# Patient Record
Sex: MALE | Race: WHITE | Employment: FULL TIME | URBAN - METROPOLITAN AREA
[De-identification: names, ages, dates, MRNs, and addresses within clinical notes are randomized per-mention and may not be internally consistent; named-entity substitution may affect disease eponyms.]

---

## 2018-01-22 PROBLEM — M43.16 SPONDYLOLISTHESIS AT L4-L5 LEVEL: Status: ACTIVE | Noted: 2018-01-22

## 2018-03-12 ENCOUNTER — TREATMENT (OUTPATIENT)
Dept: PHYSICAL THERAPY | Age: 38
End: 2018-03-12
Payer: MEDICAID

## 2018-03-12 DIAGNOSIS — M43.16 SPONDYLOLISTHESIS AT L4-L5 LEVEL: Primary | ICD-10-CM

## 2018-03-12 PROCEDURE — 97112 NEUROMUSCULAR REEDUCATION: CPT

## 2018-03-12 PROCEDURE — 97110 THERAPEUTIC EXERCISES: CPT

## 2018-03-12 PROCEDURE — G0283 ELEC STIM OTHER THAN WOUND: HCPCS

## 2018-03-23 ENCOUNTER — TELEPHONE (OUTPATIENT)
Dept: PHYSICAL THERAPY | Age: 38
End: 2018-03-23

## 2019-09-19 ENCOUNTER — TELEPHONE (OUTPATIENT)
Dept: PHYSICAL MEDICINE AND REHAB | Age: 39
End: 2019-09-19

## 2019-09-19 NOTE — TELEPHONE ENCOUNTER
The patient was last seen on 1/16/18 for evaluation of lower back pain and right hip pain. He has gone through PT for these. Patient is now complaining of a lot of pain in the left hip and wants to schedule an appointment. I informed him that he may need a referral for the left hip. He stated that he does not have a PCP and the physician who referred him for the right hip was an Ortho doctor from [address] [place] [address], who he's only seen once. Please advise.

## 2019-09-27 ENCOUNTER — OFFICE VISIT (OUTPATIENT)
Dept: FAMILY MEDICINE CLINIC | Age: 39
End: 2019-09-27
Payer: MEDICAID

## 2019-09-27 VITALS
DIASTOLIC BLOOD PRESSURE: 80 MMHG | HEART RATE: 59 BPM | SYSTOLIC BLOOD PRESSURE: 119 MMHG | HEIGHT: 69 IN | RESPIRATION RATE: 16 BRPM | WEIGHT: 167 LBS | OXYGEN SATURATION: 98 % | BODY MASS INDEX: 24.73 KG/M2

## 2019-09-27 DIAGNOSIS — M54.16 LUMBAR RADICULOPATHY: Primary | ICD-10-CM

## 2019-09-27 PROCEDURE — G8427 DOCREV CUR MEDS BY ELIG CLIN: HCPCS | Performed by: STUDENT IN AN ORGANIZED HEALTH CARE EDUCATION/TRAINING PROGRAM

## 2019-09-27 PROCEDURE — 99203 OFFICE O/P NEW LOW 30 MIN: CPT | Performed by: STUDENT IN AN ORGANIZED HEALTH CARE EDUCATION/TRAINING PROGRAM

## 2019-09-27 PROCEDURE — G8420 CALC BMI NORM PARAMETERS: HCPCS | Performed by: STUDENT IN AN ORGANIZED HEALTH CARE EDUCATION/TRAINING PROGRAM

## 2019-09-27 PROCEDURE — 1036F TOBACCO NON-USER: CPT | Performed by: STUDENT IN AN ORGANIZED HEALTH CARE EDUCATION/TRAINING PROGRAM

## 2019-09-27 RX ORDER — GABAPENTIN 100 MG/1
100 CAPSULE ORAL 2 TIMES DAILY
Qty: 60 CAPSULE | Refills: 0 | Status: SHIPPED | OUTPATIENT
Start: 2019-09-27 | End: 2019-10-25 | Stop reason: SDUPTHER

## 2019-09-27 RX ORDER — CYCLOBENZAPRINE HCL 5 MG
5 TABLET ORAL 2 TIMES DAILY PRN
Qty: 10 TABLET | Refills: 0 | Status: CANCELLED | OUTPATIENT
Start: 2019-09-27 | End: 2019-10-07

## 2019-09-27 ASSESSMENT — PATIENT HEALTH QUESTIONNAIRE - PHQ9
SUM OF ALL RESPONSES TO PHQ9 QUESTIONS 1 & 2: 0
2. FEELING DOWN, DEPRESSED OR HOPELESS: 0
1. LITTLE INTEREST OR PLEASURE IN DOING THINGS: 0
SUM OF ALL RESPONSES TO PHQ QUESTIONS 1-9: 0
SUM OF ALL RESPONSES TO PHQ QUESTIONS 1-9: 0

## 2019-09-27 ASSESSMENT — ENCOUNTER SYMPTOMS
COUGH: 1
NAUSEA: 0
SHORTNESS OF BREATH: 0
BACK PAIN: 1
DIARRHEA: 0
CONSTIPATION: 0
ABDOMINAL PAIN: 0
BLOOD IN STOOL: 0
SINUS PAIN: 0
WHEEZING: 0
SORE THROAT: 0
VOMITING: 0

## 2019-10-07 ENCOUNTER — HOSPITAL ENCOUNTER (OUTPATIENT)
Dept: MRI IMAGING | Age: 39
Discharge: HOME | End: 2019-10-09
Payer: MEDICAID

## 2019-10-07 DIAGNOSIS — M54.16 LUMBAR RADICULOPATHY: ICD-10-CM

## 2019-10-07 PROCEDURE — 72148 MRI LUMBAR SPINE W/O DYE: CPT

## 2019-10-25 ENCOUNTER — OFFICE VISIT (OUTPATIENT)
Dept: FAMILY MEDICINE CLINIC | Age: 39
End: 2019-10-25
Payer: MEDICAID

## 2019-10-25 VITALS
WEIGHT: 166 LBS | HEART RATE: 83 BPM | RESPIRATION RATE: 16 BRPM | HEIGHT: 69 IN | SYSTOLIC BLOOD PRESSURE: 116 MMHG | DIASTOLIC BLOOD PRESSURE: 77 MMHG | BODY MASS INDEX: 24.59 KG/M2 | OXYGEN SATURATION: 96 %

## 2019-10-25 DIAGNOSIS — M54.16 LUMBAR RADICULOPATHY: Primary | ICD-10-CM

## 2019-10-25 PROCEDURE — 99213 OFFICE O/P EST LOW 20 MIN: CPT | Performed by: STUDENT IN AN ORGANIZED HEALTH CARE EDUCATION/TRAINING PROGRAM

## 2019-10-25 PROCEDURE — G8420 CALC BMI NORM PARAMETERS: HCPCS | Performed by: STUDENT IN AN ORGANIZED HEALTH CARE EDUCATION/TRAINING PROGRAM

## 2019-10-25 PROCEDURE — G8427 DOCREV CUR MEDS BY ELIG CLIN: HCPCS | Performed by: STUDENT IN AN ORGANIZED HEALTH CARE EDUCATION/TRAINING PROGRAM

## 2019-10-25 PROCEDURE — G8484 FLU IMMUNIZE NO ADMIN: HCPCS | Performed by: STUDENT IN AN ORGANIZED HEALTH CARE EDUCATION/TRAINING PROGRAM

## 2019-10-25 PROCEDURE — 1036F TOBACCO NON-USER: CPT | Performed by: STUDENT IN AN ORGANIZED HEALTH CARE EDUCATION/TRAINING PROGRAM

## 2019-10-25 RX ORDER — NAPROXEN SODIUM 220 MG
220 TABLET ORAL 2 TIMES DAILY PRN
Qty: 30 TABLET | Refills: 0 | COMMUNITY
Start: 2019-10-25 | End: 2021-05-27

## 2019-10-25 RX ORDER — GABAPENTIN 100 MG/1
100 CAPSULE ORAL 3 TIMES DAILY
Qty: 90 CAPSULE | Refills: 0 | Status: SHIPPED
Start: 2019-10-25 | End: 2020-06-04 | Stop reason: ALTCHOICE

## 2019-10-28 ENCOUNTER — OFFICE VISIT (OUTPATIENT)
Dept: NEUROSURGERY | Age: 39
End: 2019-10-28
Payer: MEDICAID

## 2019-10-28 VITALS
DIASTOLIC BLOOD PRESSURE: 84 MMHG | BODY MASS INDEX: 24.51 KG/M2 | HEART RATE: 54 BPM | SYSTOLIC BLOOD PRESSURE: 130 MMHG | HEIGHT: 69 IN

## 2019-10-28 DIAGNOSIS — M43.16 SPONDYLOLISTHESIS AT L4-L5 LEVEL: Primary | ICD-10-CM

## 2019-10-28 PROCEDURE — 1036F TOBACCO NON-USER: CPT | Performed by: PHYSICIAN ASSISTANT

## 2019-10-28 PROCEDURE — G8428 CUR MEDS NOT DOCUMENT: HCPCS | Performed by: PHYSICIAN ASSISTANT

## 2019-10-28 PROCEDURE — G8420 CALC BMI NORM PARAMETERS: HCPCS | Performed by: PHYSICIAN ASSISTANT

## 2019-10-28 PROCEDURE — 99203 OFFICE O/P NEW LOW 30 MIN: CPT | Performed by: PHYSICIAN ASSISTANT

## 2019-10-28 PROCEDURE — G8484 FLU IMMUNIZE NO ADMIN: HCPCS | Performed by: PHYSICIAN ASSISTANT

## 2019-12-09 ENCOUNTER — PREP FOR PROCEDURE (OUTPATIENT)
Dept: PAIN MANAGEMENT | Age: 39
End: 2019-12-09

## 2019-12-09 ENCOUNTER — OFFICE VISIT (OUTPATIENT)
Dept: PAIN MANAGEMENT | Age: 39
End: 2019-12-09
Payer: MEDICAID

## 2019-12-09 VITALS
HEIGHT: 70 IN | DIASTOLIC BLOOD PRESSURE: 80 MMHG | WEIGHT: 168 LBS | OXYGEN SATURATION: 93 % | HEART RATE: 69 BPM | SYSTOLIC BLOOD PRESSURE: 108 MMHG | TEMPERATURE: 98.3 F | BODY MASS INDEX: 24.05 KG/M2 | RESPIRATION RATE: 18 BRPM

## 2019-12-09 DIAGNOSIS — M47.816 LUMBAR FACET ARTHROPATHY: Primary | ICD-10-CM

## 2019-12-09 DIAGNOSIS — M43.16 SPONDYLOLISTHESIS OF LUMBAR REGION: ICD-10-CM

## 2019-12-09 DIAGNOSIS — M51.9 LUMBAR DISC DISORDER: ICD-10-CM

## 2019-12-09 DIAGNOSIS — M43.16 SPONDYLOLISTHESIS AT L4-L5 LEVEL: ICD-10-CM

## 2019-12-09 DIAGNOSIS — M47.816 LUMBAR SPONDYLOSIS: ICD-10-CM

## 2019-12-09 PROCEDURE — G8427 DOCREV CUR MEDS BY ELIG CLIN: HCPCS | Performed by: PAIN MEDICINE

## 2019-12-09 PROCEDURE — G8420 CALC BMI NORM PARAMETERS: HCPCS | Performed by: PAIN MEDICINE

## 2019-12-09 PROCEDURE — 99204 OFFICE O/P NEW MOD 45 MIN: CPT | Performed by: PAIN MEDICINE

## 2019-12-09 PROCEDURE — G8484 FLU IMMUNIZE NO ADMIN: HCPCS | Performed by: PAIN MEDICINE

## 2019-12-09 PROCEDURE — 1036F TOBACCO NON-USER: CPT | Performed by: PAIN MEDICINE

## 2020-01-13 ENCOUNTER — HOSPITAL ENCOUNTER (OUTPATIENT)
Age: 40
Setting detail: OUTPATIENT SURGERY
Discharge: HOME | End: 2020-01-13
Attending: PAIN MEDICINE | Admitting: PAIN MEDICINE
Payer: MEDICAID

## 2020-01-13 ENCOUNTER — HOSPITAL ENCOUNTER (OUTPATIENT)
Dept: OPERATING ROOM | Age: 40
Setting detail: OUTPATIENT SURGERY
Discharge: HOME | End: 2020-01-13
Attending: PAIN MEDICINE
Payer: MEDICAID

## 2020-01-13 VITALS
HEART RATE: 57 BPM | DIASTOLIC BLOOD PRESSURE: 67 MMHG | SYSTOLIC BLOOD PRESSURE: 116 MMHG | OXYGEN SATURATION: 99 % | RESPIRATION RATE: 16 BRPM

## 2020-01-13 PROCEDURE — 64495 INJ PARAVERT F JNT L/S 3 LEV: CPT | Performed by: PAIN MEDICINE

## 2020-01-13 PROCEDURE — 7100000011 HC PHASE II RECOVERY - ADDTL 15 MIN: Performed by: PAIN MEDICINE

## 2020-01-13 PROCEDURE — 64494 INJ PARAVERT F JNT L/S 2 LEV: CPT | Performed by: PAIN MEDICINE

## 2020-01-13 PROCEDURE — 3209999900 FLUORO FOR SURGICAL PROCEDURES

## 2020-01-13 PROCEDURE — 2709999900 HC NON-CHARGEABLE SUPPLY: Performed by: PAIN MEDICINE

## 2020-01-13 PROCEDURE — 7100000010 HC PHASE II RECOVERY - FIRST 15 MIN: Performed by: PAIN MEDICINE

## 2020-01-13 PROCEDURE — 64493 INJ PARAVERT F JNT L/S 1 LEV: CPT | Performed by: PAIN MEDICINE

## 2020-01-13 PROCEDURE — 6360000002 HC RX W HCPCS: Performed by: PAIN MEDICINE

## 2020-01-13 PROCEDURE — 2500000003 HC RX 250 WO HCPCS: Performed by: PAIN MEDICINE

## 2020-01-13 PROCEDURE — 3600000005 HC SURGERY LEVEL 5 BASE: Performed by: PAIN MEDICINE

## 2020-01-13 RX ORDER — LIDOCAINE HYDROCHLORIDE 5 MG/ML
INJECTION, SOLUTION INFILTRATION; INTRAVENOUS PRN
Status: DISCONTINUED | OUTPATIENT
Start: 2020-01-13 | End: 2020-01-13 | Stop reason: ALTCHOICE

## 2020-01-13 ASSESSMENT — PAIN DESCRIPTION - DESCRIPTORS: DESCRIPTORS: ACHING

## 2020-01-13 ASSESSMENT — PAIN SCALES - GENERAL
PAINLEVEL_OUTOF10: 0
PAINLEVEL_OUTOF10: 0

## 2020-01-13 ASSESSMENT — PAIN - FUNCTIONAL ASSESSMENT: PAIN_FUNCTIONAL_ASSESSMENT: 0-10

## 2020-01-13 NOTE — H&P
[facility]  [address] [facility], [address] [place] [address]  [phone]    Procedure History & Physical      Pia Tao     HPI:    Patient  is here for axial low back pain for Right L3,4 MB and L5 DR block  Labs/imaging studies reviewed   All question and concerns addressed including R/B/A associated with the procedure    Past Medical History:   Diagnosis Date    Chronic back pain     Kidney stone        Past Surgical History:   Procedure Laterality Date    DENTAL SURGERY         Prior to Admission medications    Medication Sig Start Date End Date Taking? Authorizing Provider   gabapentin (NEURONTIN) 100 MG capsule Take 1 capsule by mouth 3 times daily for 30 days.  Intended supply: 30 days 10/25/19 12/19/19 Yes Nitesh Leonardo,    naproxen sodium (ALEVE) 220 MG tablet Take 1 tablet by mouth 2 times daily as needed for Pain 10/25/19  Yes Malvin Ramirez, DO   Tens Unit MISC by Does not apply route 18  Yes Balbir Bars, DO       No Known Allergies    Social History     Socioeconomic History    Marital status:      Spouse name: Not on file    Number of children: Not on file    Years of education: Not on file    Highest education level: Not on file   Occupational History    Not on file   Social Needs    Financial resource strain: Not on file    Food insecurity:     Worry: Not on file     Inability: Not on file    Transportation needs:     Medical: Not on file     Non-medical: Not on file   Tobacco Use    Smoking status: Former Smoker     Packs/day: 0.50     Years: 23.00     Pack years: 11.50     Types: Cigarettes     Last attempt to quit:      Years since quittin.0    Smokeless tobacco: Never Used   Substance and Sexual Activity    Alcohol use: No    Drug use: No    Sexual activity: Not on file   Lifestyle    Physical activity:     Days per week: Not on file     Minutes per session: Not on file    Stress: Not on file   Relationships    Social connections:     Talks on phone: Not on file     Gets together: Not on file     Attends Protestant service: Not on file     Active member of club or organization: Not on file     Attends meetings of clubs or organizations: Not on file     Relationship status: Not on file    Intimate partner violence:     Fear of current or ex partner: Not on file     Emotionally abused: Not on file     Physically abused: Not on file     Forced sexual activity: Not on file   Other Topics Concern    Not on file   Social History Narrative    Not on file       Family History   Problem Relation Age of Onset    Heart Disease Mother     Diabetes Mother          REVIEW OF SYSTEMS:    CONSTITUTIONAL:  negative for  fevers, chills, sweats and fatigue    RESPIRATORY:  negative for  dry cough, cough with sputum, dyspnea, wheezing and chest pain    CARDIOVASCULAR:  negative for chest pain, dyspnea, palpitations, syncope    GASTROINTESTINAL:  negative for nausea, vomiting, change in bowel habits, diarrhea, constipation and abdominal pain    MUSCULOSKELETAL: negative for muscle weakness    SKIN: negative for itching or rashes. BEHAVIOR/PSYCH:  negative for poor appetite, increased appetite, decreased sleep and poor concentration    All other systems negative      PHYSICAL EXAM:    VITALS:  /77   Pulse 71   Resp 16   SpO2 98%     CONSTITUTIONAL:  awake, alert, cooperative, no apparent distress, and appears stated age    EYES: PERRLA, EOMI    LUNGS:  No increased work of breathing, no audible wheezing    CARDIOVASCULAR:  regular rate and rhythm    ABDOMEN:  Soft non tender non distended     EXTREMITIES: no signs of clubbing or cyanosis. MUSCULOSKELETAL: negative for flaccid muscle tone or spastic movements. SKIN: gross examination reveals no signs of rashes, or diaphoresis. NEURO: Cranial nerves II-XII grossly intact. No signs of agitated mood.        Assessment/Plan:    Axial low back pain for Right L3,4 MB and L5  block

## 2020-01-13 NOTE — OP NOTE
2020    Patient: Joanna Gillette  :  1980  Age:  37 y.o. Sex:  male     PRE-OPERATIVE DIAGNOSIS: Right Lumbar spondylosis, lumbar facet syndrome. POST-OPERATIVE DIAGNOSIS: Same. PROCEDURE:  # 1 Fluoroscopic guided lumbar medial branch blocks Right at Levels: L3-4, L4-5, L5-S1. SURGEON: BRAYAN Jimenez M.D. ANESTHESIA: Local    ESTIMATED BLOOD LOSS: None.  ______________________________________________________________________  BRIEF HISTORY:  Joanna Gillette comes in today for 1 fluoroscopic guided lumbar medial branch blocks  Right  at Levels: L3-4, L4-5, L5-S1. The potential complications of this procedure were discussed with him again today. He has elected to undergo the aforementioned procedure. Marycarmen complete History & Physical examination were reviewed in depth, a copy of which is in the chart. DESCRIPTION OF PROCEDURE:   After confirming written and informed consent, a time-out was performed and Marycarmen name and date of birth, the procedure to be performed as well as the plan for the location of the needle insertion were confirmed. The patient was brought into the procedure room and placed in the prone position on the fluoroscopy table. Standard monitors were placed and vital signs were observed throughout the procedure. The area of the lumbar spine was prepped with chloraprep and draped in a sterile manner. AP fluoroscopy was used to identify and frank bartons point at the targeted levels. The skin and subcutaneous tissues in these identified areas were anesthetized with 0.5%Lidocaine. A 22 # gauge 3 1/2 spinal needle was advanced toward the junction of the superior articular process and the transverse process, along the course of the medial branch. Satisfactory needle placement was confirmed by AP and oblique projections.   At the sacral alar level, the sacral alar region was visualized and the needle tip was positioned on the sacral alar at the base of the superior articulating process where the medial branch traverses under fluoroscopic guidance. Once bone was contacted and negative aspiration was confirmed. A solution of 0.25% marcaine with 40 DepoMedrol 3 cc was then injected and distributed equally at each level. Following the procedure Bob Mclaughlin noted improvement of previous pain symptoms. Disposition the patient tolerated the procedure well and there were no complications . Vital signs remained stable throughout the procedure. The patient was escorted to the recovery area where they remained until discharge and written discharge instructions for the procedure were given. Plan: Bob [place] will return to our pain management center as scheduled.      Froy Gomez MD

## 2020-02-11 ENCOUNTER — PREP FOR PROCEDURE (OUTPATIENT)
Dept: PAIN MANAGEMENT | Age: 40
End: 2020-02-11

## 2020-02-11 ENCOUNTER — OFFICE VISIT (OUTPATIENT)
Dept: PAIN MANAGEMENT | Age: 40
End: 2020-02-11
Payer: MEDICAID

## 2020-02-11 VITALS
HEART RATE: 76 BPM | BODY MASS INDEX: 24.73 KG/M2 | RESPIRATION RATE: 16 BRPM | DIASTOLIC BLOOD PRESSURE: 64 MMHG | SYSTOLIC BLOOD PRESSURE: 106 MMHG | HEIGHT: 69 IN | TEMPERATURE: 98.1 F | WEIGHT: 167 LBS

## 2020-02-11 PROCEDURE — G8427 DOCREV CUR MEDS BY ELIG CLIN: HCPCS | Performed by: PAIN MEDICINE

## 2020-02-11 PROCEDURE — 99213 OFFICE O/P EST LOW 20 MIN: CPT | Performed by: PAIN MEDICINE

## 2020-02-11 PROCEDURE — G8420 CALC BMI NORM PARAMETERS: HCPCS | Performed by: PAIN MEDICINE

## 2020-02-11 PROCEDURE — 1036F TOBACCO NON-USER: CPT | Performed by: PAIN MEDICINE

## 2020-02-11 PROCEDURE — G8484 FLU IMMUNIZE NO ADMIN: HCPCS | Performed by: PAIN MEDICINE

## 2020-02-11 NOTE — PROGRESS NOTES
Anna Rosado presents to the [address] on 2/11/2020. [facility] is complaining of pain low back and at times in hips. The pain is intermittent. The pain is described as stabbing and sharp when it comes. Pain is rated on his best day at a 1, on his worst day at a 9, and on average at a 1 on the VAS scale. He took his last dose of Neurontin yesterday. [facility] does not have issues with constipation. Any procedures since your last visit: Yes,   1-13-20-RIGHT L3,4 MEDIAL BRANCH AND DORSAL RAMUS BLOCK   80 percent relief for 4 weeks, is able to move better after the injection. He is  on NSAIDS, Aleve and  is not on anticoagulation medications to include none. Pacemaker or defibrilator: No.       /64   Pulse 76   Temp 98.1 °F (36.7 °C) (Oral)   Resp 16   Ht 5' 9\" (1.753 m)   Wt 167 lb (75.8 kg)   BMI 24.66 kg/m²      No LMP for male patient.
Quadriceps5/5           Right Gastrocnemius5/5    Left Gastrocnemius5/5  Right Ant Tibialis5/5  Left Ant Tibialis5/5  Reflexes:    Right Quadriceps reflex2+  Left Quadriceps reflex2+  Right Achilles reflex2+  Left Achilles reflex2+  Gait:normal     Dermatology:     Skin:no unusual rashes and no skin lesions     Impression:  Chronic low back pain with radiation to the right buttocks and numbness in the Left leg  Lumbar spine MRI 2019 mild disc bulge at L4-5 with facet arthropathy  Patient had seen neurosurgery and is not a candidate for surgery  Plan:  Follow up on his low back pain   Patient is s/p right L3,4 MB and L5 DR block with excellent outcome, more than 80% for one week. Discussed repeating and if his response is consistent with first injection will proceed with RFA  Continue with Gabapentin 300 mg QD  Continue with OTC pain medications   OARRS report reviewed  Patient encouraged to stay active and to lose weight  Treatment plan discussed with the patient including medications and procedure side effects     We discussed with the patient that combining opioids, benzodiazepines, alcohol, illicit drugs or sleep aids increases the risk of respiratory depression including death. We discussed that these medications may cause drowsiness, sedation or dizziness and have counseled the patient not to drive or operate machinery. We have discussed that these medications will be prescribed only by one provider. We have discussed with the patient about age related risk factors and have thoroughly discussed the importance of taking these medications as prescribed. The patient verbalizes understanding. danielle Lora M.D.

## 2020-02-20 ENCOUNTER — HOSPITAL ENCOUNTER (OUTPATIENT)
Age: 40
Setting detail: OUTPATIENT SURGERY
Discharge: HOME | End: 2020-02-20
Attending: PAIN MEDICINE | Admitting: PAIN MEDICINE
Payer: MEDICAID

## 2020-02-20 VITALS
HEART RATE: 64 BPM | SYSTOLIC BLOOD PRESSURE: 113 MMHG | RESPIRATION RATE: 16 BRPM | DIASTOLIC BLOOD PRESSURE: 71 MMHG | OXYGEN SATURATION: 98 %

## 2020-02-20 PROCEDURE — 7100000011 HC PHASE II RECOVERY - ADDTL 15 MIN: Performed by: PAIN MEDICINE

## 2020-02-20 PROCEDURE — 64494 INJ PARAVERT F JNT L/S 2 LEV: CPT | Performed by: PAIN MEDICINE

## 2020-02-20 PROCEDURE — 64495 INJ PARAVERT F JNT L/S 3 LEV: CPT | Performed by: PAIN MEDICINE

## 2020-02-20 PROCEDURE — 64493 INJ PARAVERT F JNT L/S 1 LEV: CPT | Performed by: PAIN MEDICINE

## 2020-02-20 PROCEDURE — 6360000002 HC RX W HCPCS: Performed by: PAIN MEDICINE

## 2020-02-20 PROCEDURE — 7100000010 HC PHASE II RECOVERY - FIRST 15 MIN: Performed by: PAIN MEDICINE

## 2020-02-20 PROCEDURE — 3600000005 HC SURGERY LEVEL 5 BASE: Performed by: PAIN MEDICINE

## 2020-02-20 PROCEDURE — 2500000003 HC RX 250 WO HCPCS: Performed by: PAIN MEDICINE

## 2020-02-20 PROCEDURE — 2709999900 HC NON-CHARGEABLE SUPPLY: Performed by: PAIN MEDICINE

## 2020-02-20 RX ORDER — LIDOCAINE HYDROCHLORIDE 5 MG/ML
INJECTION, SOLUTION INFILTRATION; INTRAVENOUS PRN
Status: DISCONTINUED | OUTPATIENT
Start: 2020-02-20 | End: 2020-02-20 | Stop reason: ALTCHOICE

## 2020-02-20 ASSESSMENT — PAIN SCALES - GENERAL
PAINLEVEL_OUTOF10: 0
PAINLEVEL_OUTOF10: 0

## 2020-02-20 ASSESSMENT — PAIN - FUNCTIONAL ASSESSMENT
PAIN_FUNCTIONAL_ASSESSMENT: 0-10
PAIN_FUNCTIONAL_ASSESSMENT: PREVENTS OR INTERFERES SOME ACTIVE ACTIVITIES AND ADLS

## 2020-02-20 ASSESSMENT — PAIN DESCRIPTION - DESCRIPTORS: DESCRIPTORS: ACHING

## 2020-02-20 NOTE — H&P
[address]  [address] [facility], [address] [place] [address] [place]  [phone]    Procedure History & Physical      Cyndi Baerrenetta     HPI:    Patient  is here for axial low back pain for right L3,4,5 MBB    Labs/imaging studies reviewed   All question and concerns addressed including R/B/A associated with the procedure    Past Medical History:   Diagnosis Date    Chronic back pain     Kidney stone        Past Surgical History:   Procedure Laterality Date    ANESTHESIA NERVE BLOCK Right 2020    RIGHT L3,4 MEDIAL BRANCH AND DORSAL RAMUS BLOCK WITHOUT SEDATION (CPT 43468,53080) performed by Guillermo Khan MD at [place] [address] Right 2020    mbb and dorsal rami       Prior to Admission medications    Medication Sig Start Date End Date Taking? Authorizing Provider   gabapentin (NEURONTIN) 100 MG capsule Take 1 capsule by mouth 3 times daily for 30 days.  Intended supply: 30 days 10/25/19 2/13/20 Yes Juliet Leonardo,    naproxen sodium (ALEVE) 220 MG tablet Take 1 tablet by mouth 2 times daily as needed for Pain 10/25/19  Yes Audrey Cho DO   Tens Unit MISC by Does not apply route 18   Mil Enriquez,        No Known Allergies    Social History     Socioeconomic History    Marital status:      Spouse name: Not on file    Number of children: Not on file    Years of education: Not on file    Highest education level: Not on file   Occupational History    Not on file   Social Needs    Financial resource strain: Not on file    Food insecurity:     Worry: Not on file     Inability: Not on file    Transportation needs:     Medical: Not on file     Non-medical: Not on file   Tobacco Use    Smoking status: Former Smoker     Packs/day: 0.50     Years: 23.00     Pack years: 11.50     Types: Cigarettes     Last attempt to quit:      Years since quittin.1    Smokeless tobacco: Never Used   Substance and Sexual Activity    Alcohol use: No    Drug use: No    Sexual activity: Not on file   Lifestyle    Physical activity:     Days per week: Not on file     Minutes per session: Not on file    Stress: Not on file   Relationships    Social connections:     Talks on phone: Not on file     Gets together: Not on file     Attends [employer] service: Not on file     Active member of club or organization: Not on file     Attends meetings of clubs or organizations: Not on file     Relationship status: Not on file    Intimate partner violence:     Fear of current or ex partner: Not on file     Emotionally abused: Not on file     Physically abused: Not on file     Forced sexual activity: Not on file   Other Topics Concern    Not on file   Social History Narrative    Not on file       Family History   Problem Relation Age of Onset    Heart Disease Mother     Diabetes Mother          REVIEW OF SYSTEMS:    CONSTITUTIONAL:  negative for  fevers, chills, sweats and fatigue    RESPIRATORY:  negative for  dry cough, cough with sputum, dyspnea, wheezing and chest pain    CARDIOVASCULAR:  negative for chest pain, dyspnea, palpitations, syncope    GASTROINTESTINAL:  negative for nausea, vomiting, change in bowel habits, diarrhea, constipation and abdominal pain    MUSCULOSKELETAL: negative for muscle weakness    SKIN: negative for itching or rashes. BEHAVIOR/PSYCH:  negative for poor appetite, increased appetite, decreased sleep and poor concentration    All other systems negative      PHYSICAL EXAM:    VITALS:  /78   Pulse 61   Resp 20   SpO2 99%     CONSTITUTIONAL:  awake, alert, cooperative, no apparent distress, and appears stated age    EYES: PERRLA, EOMI    LUNGS:  No increased work of breathing, no audible wheezing    CARDIOVASCULAR:  regular rate and rhythm    ABDOMEN:  Soft non tender non distended     EXTREMITIES: no signs of clubbing or cyanosis.     MUSCULOSKELETAL: negative for flaccid muscle tone or spastic movements. SKIN: gross examination reveals no signs of rashes, or diaphoresis. NEURO: Cranial nerves II-XII grossly intact. No signs of agitated mood.        Assessment/Plan:    Axial low back pain for right L3,4,5 MBB

## 2020-03-23 ENCOUNTER — TELEPHONE (OUTPATIENT)
Dept: PAIN MANAGEMENT | Age: 40
End: 2020-03-23

## 2020-03-24 ENCOUNTER — PREP FOR PROCEDURE (OUTPATIENT)
Dept: PAIN MANAGEMENT | Age: 40
End: 2020-03-24

## 2020-03-24 ENCOUNTER — VIRTUAL VISIT (OUTPATIENT)
Dept: PAIN MANAGEMENT | Age: 40
End: 2020-03-24

## 2020-03-24 NOTE — PROGRESS NOTES
[address]  [address] [facility], [address] [place] [address] [place]  [phone]        Date of Visit:  3/24/2020    CC:     Consent:  He and/or health care decision maker is aware that that he may receive a bill for this telephone service, depending on his insurance coverage, and has provided verbal consent to proceed: Yes    HPI:  Documentation:  I communicated with the patient and/or health care decision maker about low back pain. Pain is better. Appropriate analgesia with current medications regimen: n/a. Change in quality of symptoms:no. Medication side effects:none. Recent diagnostic testing:none. Recent interventional procedures:Right L3,4 MB and L5 Block . excellent. 100% improvement for more than one week    He has not been on anticoagulation medications to include none. The patient  has not been on herbal supplements. The patient is not diabetic.     Imaging:   Lumbar spine MRI 10/2019   Changes within the facets are abnormal. Findings are compatible with   degenerative facet disease.           At L5-S1: There is a mild broad-based disc herniation, there is mild   canal stenosis       At L4-5: There is a mild broad-based disc herniation, there is mild   canal stenosis, slightly more prominent towards the left extending   into the left neural foramina       At L3-4: There is no significant disc herniation, there is no   significant canal stenosis.       At L2-3: There is no significant disc herniation, there is no   significant canal stenosis.       At L1-2: There is no significant disc herniation, there is no   significant canal stenosis.      Lumbar spine flexsion/extension 08/2018  L4 spondylolysis with grade 1 spondylolisthesis. Approximately 4 mm translation on flexion-extension views.      Previous treatments: Physical Therapy and medications. Ignacia Coburn                                        Potential Aberrant Drug-Related Behavior:  No     Urine Drug Screening:  None, no opioids started      OARRS report:  02/2020 consistent     Past Medical History: Reviewed    Past Surgical History: Reviewed     Home Medications: Reviewed    Allergies: Reviewed     Social History: Reviewed     REVIEW OF SYSTEMS:     Alex November denies fever/chills, chest pain, shortness of breath, new bowel or bladder complaints. All other review of systems was negative. PHYSICAL EXAMINATION:     General:       A & O x3    Lungs:    Breathing:Breathing Pattern: regular, no distress      Impression:  Chronic low back pain with radiation to the right buttocks and numbness in the Left leg  Lumbar spine MRI 2019 mild disc bulge at L4-5 with facet arthropathy  Patient had seen neurosurgery and is not a candidate for surgery  Plan:  Follow up on his low back pain   Patient is s/p repeat right L3,4 MB and L5 DR block with excellent outcome, more than 90% for one week. Discussed RFA Patient wants to proceed  Continue with Gabapentin 300 mg QD  Continue with OTC pain medications   OARRS report reviewed   Patient encouraged to stay active and to lose weight   Treatment plan discussed with the patient including medications and procedure side effects     We discussed with the patient that combining opioids, benzodiazepines, alcohol, illicit drugs or sleep aids increases the risk of respiratory depression including death. We discussed that these medications may cause drowsiness, sedation or dizziness and have counseled the patient not to drive or operate machinery. We have discussed that these medications will be prescribed only by one provider. We have discussed with the patient about age related risk factors and have thoroughly discussed the importance of taking these medications as prescribed. The patient verbalizes understanding. Steps to help prevent the spread of COVID-19 if you are sick  SOURCE - https://chung-abdiaziz.info/. html     Stay home except to get medical care   ; Stay

## 2020-05-19 ENCOUNTER — VIRTUAL VISIT (OUTPATIENT)
Dept: PAIN MANAGEMENT | Age: 40
End: 2020-05-19
Payer: MEDICAID

## 2020-05-19 PROBLEM — M25.511 CHRONIC RIGHT SHOULDER PAIN: Status: ACTIVE | Noted: 2020-05-19

## 2020-05-19 PROBLEM — M47.812 CERVICAL FACET JOINT SYNDROME: Status: ACTIVE | Noted: 2020-05-19

## 2020-05-19 PROBLEM — G89.29 CHRONIC RIGHT SHOULDER PAIN: Status: ACTIVE | Noted: 2020-05-19

## 2020-05-19 PROBLEM — M47.812 CERVICAL SPONDYLOSIS: Status: ACTIVE | Noted: 2020-05-19

## 2020-05-19 PROCEDURE — 99213 OFFICE O/P EST LOW 20 MIN: CPT | Performed by: PAIN MEDICINE

## 2020-05-19 PROCEDURE — G8427 DOCREV CUR MEDS BY ELIG CLIN: HCPCS | Performed by: PAIN MEDICINE

## 2020-05-19 NOTE — PROGRESS NOTES
[address] [place] [address], [address] [place] [address] [place]  [phone]    Tele health follow up Note      Randee Heath     Date of Visit:  5/19/2020    CC:  Tele health follow up   Chief Complaint   Patient presents with    Follow-up     LOWER BACK, NECK       Consent:  Telehealth Visit due to [place] 19 pandemic  The patient and/or health care decision maker is aware that he/she may receive a bill for this tele-health service Doxy Me, depending on his insurance coverage, and has provided verbal consent to proceed: Yes  I have considered the risks of abuse, dependence, addiction and diversion. My patient is aware that they will need a follow-up visit (in-person or virtually) at the appropriate time indicated for continued medications. Further, my patient is aware that when this acute crisis has lifted, they will be expected to return for an in-person visit and all elements of standard local and hospital guidelines in order to continue this medication. Patient Location:Home   Physician Location: Home     HPI:  Follow up on his low back pain with complaints of increased neck pain with radiation to the right shoulder blade. Appropriate analgesia with current medications regimen: n/a.    Change in quality of symptoms:no.    Medication side effects:none. Recent diagnostic testing:none.   Recent interventional procedures:none.     He has not been on anticoagulation medications to include none.  The patient  has not been on herbal supplements.  The patient is not diabetic.     Imaging:   Lumbar spine MRI 10/2019   Changes within the facets are abnormal. Findings are compatible with   degenerative facet disease.           At L5-S1: There is a mild broad-based disc herniation, there is mild   canal stenosis       At L4-5: There is a mild broad-based disc herniation, there is mild   canal stenosis, slightly more prominent towards the left extending   into the left neural foramina       At L3-4: There is no significant disc herniation, there is no   significant canal stenosis.       At L2-3: There is no significant disc herniation, there is no   significant canal stenosis.       At L1-2: There is no significant disc herniation, there is no   significant canal stenosis.      Lumbar spine flexsion/extension 08/2018  L4 spondylolysis with grade 1 spondylolisthesis. Approximately 4 mm translation on flexion-extension views.      Previous treatments: Physical Therapy and medications. Veronica Knott     Potential Aberrant Drug-Related Behavior:  No     Urine Drug Screening:  None, no opioids started      OARRS report:  05/2020 consistent     Past Medical History: Reviewed    Past Surgical History: Reviewed     Home Medications: Reviewed    Allergies: Reviewed     Social History: Reviewed     REVIEW OF SYSTEMS:     Josh Baars denies fever/chills, chest pain, shortness of breath, new bowel or bladder complaints. All other review of systems was negative. PHYSICAL EXAMINATION:      General:       A & O x3    HEENT:    Head:normocephalic and atraumatic  Sclera: icterus absent,     Lungs:    Breathing:Breathing Pattern: regular, no distress    Cervical spine:    Range of motion:abnormal moderately flexion, extension rotation right and is  painful. Lumbar spine:    Range of motion:not tested    Musculoskeletal:    SLR:not done right, not done left, sitting     Extremities:    Tremors:None bilaterally upper and lower  Range of motion:Generally Limited ROM of right shoulder.   Intact:Yes    Neurological:     Motor:          No apparent weakness per patient         Dermatology:    Skin:no unusual rashes and no skin lesions    Impression:    Chronic low back pain with radiation to the right buttocks and numbness in the Left leg  Lumbar spine MRI 2019 mild disc bulge at L4-5 with facet arthropathy  Patient had seen neurosurgery and is not a candidate for surgery  Plan:  Follow up on his low back pain with complaints of increased

## 2020-05-29 ENCOUNTER — HOSPITAL ENCOUNTER (OUTPATIENT)
Age: 40
Discharge: HOME | End: 2020-05-31
Payer: MEDICAID

## 2020-05-29 ENCOUNTER — HOSPITAL ENCOUNTER (OUTPATIENT)
Dept: GENERAL RADIOLOGY | Age: 40
Discharge: HOME | End: 2020-05-31
Payer: MEDICAID

## 2020-05-29 PROCEDURE — 72040 X-RAY EXAM NECK SPINE 2-3 VW: CPT

## 2020-06-04 ENCOUNTER — HOSPITAL ENCOUNTER (OUTPATIENT)
Age: 40
Discharge: HOME | End: 2020-06-06
Payer: MEDICAID

## 2020-06-04 PROCEDURE — U0003 INFECTIOUS AGENT DETECTION BY NUCLEIC ACID (DNA OR RNA); SEVERE ACUTE RESPIRATORY SYNDROME CORONAVIRUS 2 (SARS-COV-2) (CORONAVIRUS DISEASE [COVID-19]), AMPLIFIED PROBE TECHNIQUE, MAKING USE OF HIGH THROUGHPUT TECHNOLOGIES AS DESCRIBED BY CMS-2020-01-R: HCPCS

## 2020-06-05 LAB
SARS-COV-2: NOT DETECTED
SOURCE: NORMAL

## 2020-06-11 ENCOUNTER — HOSPITAL ENCOUNTER (OUTPATIENT)
Age: 40
Setting detail: OUTPATIENT SURGERY
Discharge: HOME | End: 2020-06-11
Attending: PAIN MEDICINE | Admitting: PAIN MEDICINE
Payer: MEDICAID

## 2020-06-11 ENCOUNTER — HOSPITAL ENCOUNTER (OUTPATIENT)
Dept: OPERATING ROOM | Age: 40
Setting detail: OUTPATIENT SURGERY
Discharge: HOME | End: 2020-06-11
Attending: PAIN MEDICINE
Payer: MEDICAID

## 2020-06-11 VITALS
HEART RATE: 55 BPM | RESPIRATION RATE: 14 BRPM | TEMPERATURE: 98.2 F | OXYGEN SATURATION: 98 % | SYSTOLIC BLOOD PRESSURE: 117 MMHG | DIASTOLIC BLOOD PRESSURE: 76 MMHG

## 2020-06-11 PROCEDURE — 3209999900 FLUORO FOR SURGICAL PROCEDURES

## 2020-06-11 PROCEDURE — 2500000003 HC RX 250 WO HCPCS: Performed by: PAIN MEDICINE

## 2020-06-11 PROCEDURE — 6360000002 HC RX W HCPCS: Performed by: PAIN MEDICINE

## 2020-06-11 PROCEDURE — 64636 DESTROY L/S FACET JNT ADDL: CPT | Performed by: PAIN MEDICINE

## 2020-06-11 PROCEDURE — 3600000015 HC SURGERY LEVEL 5 ADDTL 15MIN: Performed by: PAIN MEDICINE

## 2020-06-11 PROCEDURE — 64635 DESTROY LUMB/SAC FACET JNT: CPT | Performed by: PAIN MEDICINE

## 2020-06-11 PROCEDURE — 2709999900 HC NON-CHARGEABLE SUPPLY: Performed by: PAIN MEDICINE

## 2020-06-11 PROCEDURE — C1713 ANCHOR/SCREW BN/BN,TIS/BN: HCPCS | Performed by: PAIN MEDICINE

## 2020-06-11 PROCEDURE — 3600000005 HC SURGERY LEVEL 5 BASE: Performed by: PAIN MEDICINE

## 2020-06-11 PROCEDURE — 7100000010 HC PHASE II RECOVERY - FIRST 15 MIN: Performed by: PAIN MEDICINE

## 2020-06-11 PROCEDURE — 7100000011 HC PHASE II RECOVERY - ADDTL 15 MIN: Performed by: PAIN MEDICINE

## 2020-06-11 RX ORDER — BUPIVACAINE HYDROCHLORIDE 2.5 MG/ML
INJECTION, SOLUTION EPIDURAL; INFILTRATION; INTRACAUDAL PRN
Status: DISCONTINUED | OUTPATIENT
Start: 2020-06-11 | End: 2020-06-11 | Stop reason: ALTCHOICE

## 2020-06-11 RX ORDER — LIDOCAINE HYDROCHLORIDE 20 MG/ML
INJECTION, SOLUTION EPIDURAL; INFILTRATION; INTRACAUDAL; PERINEURAL PRN
Status: DISCONTINUED | OUTPATIENT
Start: 2020-06-11 | End: 2020-06-11 | Stop reason: ALTCHOICE

## 2020-06-11 RX ORDER — METHYLPREDNISOLONE ACETATE 40 MG/ML
INJECTION, SUSPENSION INTRA-ARTICULAR; INTRALESIONAL; INTRAMUSCULAR; SOFT TISSUE PRN
Status: DISCONTINUED | OUTPATIENT
Start: 2020-06-11 | End: 2020-06-11 | Stop reason: ALTCHOICE

## 2020-06-11 ASSESSMENT — PAIN DESCRIPTION - LOCATION
LOCATION: BACK
LOCATION: BACK

## 2020-06-11 ASSESSMENT — PAIN DESCRIPTION - ORIENTATION: ORIENTATION: RIGHT;LEFT

## 2020-06-11 ASSESSMENT — PAIN - FUNCTIONAL ASSESSMENT: PAIN_FUNCTIONAL_ASSESSMENT: 0-10

## 2020-06-11 ASSESSMENT — PAIN DESCRIPTION - PAIN TYPE
TYPE: SURGICAL PAIN
TYPE: SURGICAL PAIN

## 2020-06-11 ASSESSMENT — PAIN SCALES - GENERAL
PAINLEVEL_OUTOF10: 0
PAINLEVEL_OUTOF10: 0

## 2020-06-11 NOTE — H&P
[address] [facility], [address] [place] [address]  [phone]    Procedure History & Physical      Kevin Dy     HPI:    Patient  is here for axial low back pain for Right L3,4 MB and L5 DR TANMAY  Labs/imaging studies reviewed   All question and concerns addressed including R/B/A associated with the procedure    Past Medical History:   Diagnosis Date    Chronic back pain     Kidney stone        Past Surgical History:   Procedure Laterality Date    ANESTHESIA NERVE BLOCK Right 1/13/2020    RIGHT L3,4 MEDIAL BRANCH AND DORSAL RAMUS BLOCK WITHOUT SEDATION (CPT 67970,73026) performed by Dawson Braga MD at [address] [place] [address] Right 2/20/2020    RIGHT L3,4 MEDIAL BRANCH AND L5 DORSAL RAMUS BLOCK (CPT 87168,28285) performed by Dawson Braga MD at [place] [address] Right 01/13/2020    mbb and dorsal rami    NERVE BLOCK Right 02/20/2020    medial branch block       Prior to Admission medications    Medication Sig Start Date End Date Taking?  Authorizing Provider   naproxen sodium (ALEVE) 220 MG tablet Take 1 tablet by mouth 2 times daily as needed for Pain 10/25/19  Yes Sean Veliz DO   Tens Unit MISC by Does not apply route 2/9/18   Laureen Enriquez, DO       No Known Allergies    Social History     Socioeconomic History    Marital status:      Spouse name: Not on file    Number of children: Not on file    Years of education: Not on file    Highest education level: Not on file   Occupational History    Not on file   Social Needs    Financial resource strain: Not on file    Food insecurity     Worry: Not on file     Inability: Not on file    Transportation needs     Medical: Not on file     Non-medical: Not on file   Tobacco Use    Smoking status: Former Smoker     Packs/day: 0.50     Years: 23.00     Pack years: 11.50     Types: Cigarettes     Last attempt to quit: 2013     Years since quittin.4    Smokeless tobacco: Never Used   Substance and Sexual Activity    Alcohol use: No    Drug use: No    Sexual activity: Not on file   Lifestyle    Physical activity     Days per week: Not on file     Minutes per session: Not on file    Stress: Not on file   Relationships    Social connections     Talks on phone: Not on file     Gets together: Not on file     Attends Faith service: Not on file     Active member of club or organization: Not on file     Attends meetings of clubs or organizations: Not on file     Relationship status: Not on file    Intimate partner violence     Fear of current or ex partner: Not on file     Emotionally abused: Not on file     Physically abused: Not on file     Forced sexual activity: Not on file   Other Topics Concern    Not on file   Social History Narrative    Not on file       Family History   Problem Relation Age of Onset    Heart Disease Mother     Diabetes Mother          REVIEW OF SYSTEMS:    CONSTITUTIONAL:  negative for  fevers, chills, sweats and fatigue    RESPIRATORY:  negative for  dry cough, cough with sputum, dyspnea, wheezing and chest pain    CARDIOVASCULAR:  negative for chest pain, dyspnea, palpitations, syncope    GASTROINTESTINAL:  negative for nausea, vomiting, change in bowel habits, diarrhea, constipation and abdominal pain    MUSCULOSKELETAL: negative for muscle weakness    SKIN: negative for itching or rashes.     BEHAVIOR/PSYCH:  negative for poor appetite, increased appetite, decreased sleep and poor concentration    All other systems negative      PHYSICAL EXAM:    VITALS:  /74   Pulse 68   Temp 98.2 °F (36.8 °C) (Skin)   Resp 16   SpO2 97%     CONSTITUTIONAL:  awake, alert, cooperative, no apparent distress, and appears stated age    EYES: PERRLA, EOMI    LUNGS:  No increased work of breathing, no audible wheezing    CARDIOVASCULAR:  regular rate and rhythm    ABDOMEN:  Soft non tender non distended     EXTREMITIES: no signs of clubbing or cyanosis. MUSCULOSKELETAL: negative for flaccid muscle tone or spastic movements. SKIN: gross examination reveals no signs of rashes, or diaphoresis. NEURO: Cranial nerves II-XII grossly intact. No signs of agitated mood.        Assessment/Plan:    Axial low back pain for right L3,4 MB and L5 DR TANMAY

## 2020-06-11 NOTE — OP NOTE
2020    Patient: Erica Cifuentes  :  1980  Age:  37 y.o. Sex:  male     PRE-OPERATIVE DIAGNOSIS: Right Lumbar spondylosis, lumbar facet arthropathy. POST-OPERATIVE DIAGNOSIS: Same. PROCEDURE:  Fluoroscopic-guided Right  Lumbar facet medial branch radiofrequency ablation at levels L3-4, L4-5, L5-S1. SURGEON:  BRAYAN Sadler M.D. ANESTHESIA: Local    ESTIMATED BLOOD LOSS: None.  ______________________________________________________________________  HISTORY & PHYSICAL: Erica Cifuentes presents today for fluoroscopic-guided Right lumbar facet medial branch radiofrequency ablation at levels L3-4, L4-5, L5-S1. The potential complications of the procedure were explained to Shawn Vallejo again today and he has elected to undergo the aforementioned procedure. Marycarmen complete History & Physical examination were reviewed in depth, a copy of which is in the chart. DESCRIPTION OF PROCEDURE:    After confirming written and informed consent, a time-out was performed and Marycarmen name and date of birth, the procedure to be performed as well as the plan for the location of the needle insertion were confirmed. The patient was brought into the procedure room and placed in the prone position on the fluoroscopy table. Standard monitors were placed and vital signs were observed throughout the procedure. The area of the lumbar spine and upper buttocks was sterilely prepped with chloraprep and draped in a sterile manner. AP fluoroscopy was used to identify and frank bartons point at the targeted area. A 22 gauge 10 mm radiofrequency probe was advanced toward each of these points. Once bone was contacted, negative aspiration for blood and CSF was confirmed, sensory stimulation was performed at 50 Hz and at 0.4 volts generating a pressure sensation. Motor stimulation < 2.0 volts elicited multifidus twitching without any radicular symptoms.  1 ml of 2% lidocaine was injected prior to lesioning, which was performed for 90 seconds at 90 degrees centigrade. Once the lesions were complete, a solution of 0.25% marcaine 3 cc and 40 mg DepoMedrol was injected and distributed equally through each probe. The probes were removed . The patient's back was cleaned and bandages were placed over the needle insertion sites. Disposition the patient tolerated the procedure well and there were no complications . Vital signs remained stable throughout the procedure. The patient was escorted to the recovery area where they remained until discharge and written discharge instructions for the procedure were given. Plan: SAMIRA will return to our pain management center as scheduled.      Trinh aG MD

## 2020-06-23 ENCOUNTER — VIRTUAL VISIT (OUTPATIENT)
Dept: PAIN MANAGEMENT | Age: 40
End: 2020-06-23
Payer: MEDICAID

## 2020-06-23 PROBLEM — M54.12 CERVICAL RADICULOPATHY: Status: ACTIVE | Noted: 2020-06-23

## 2020-06-23 PROCEDURE — G8427 DOCREV CUR MEDS BY ELIG CLIN: HCPCS | Performed by: PAIN MEDICINE

## 2020-06-23 PROCEDURE — G8420 CALC BMI NORM PARAMETERS: HCPCS | Performed by: PAIN MEDICINE

## 2020-06-23 PROCEDURE — 99213 OFFICE O/P EST LOW 20 MIN: CPT | Performed by: PAIN MEDICINE

## 2020-06-23 PROCEDURE — 1036F TOBACCO NON-USER: CPT | Performed by: PAIN MEDICINE

## 2020-06-23 NOTE — PROGRESS NOTES
spine MRI 2019 mild disc bulge at L4-5 with facet arthropathy  Patient had seen neurosurgery and is not a candidate for surgery  Plan:  Follow up on his low back pain with complaints of increased neck pain with radiation to the right shoulder blade and numbness in the right arm. Patient is s/p right L3,4 MB and L5 DR RFA  With good outcome. Results of cervical spine Xray were discussed with the patient. Moderate degenerative changes C5-6. Will schedule patient for cervical spine MRI. Will consider interventional procedures after reviewing MRI. Continue with Gabapentin 300 mg QD (no refill needed). Continue with OTC pain medications. OARRS report reviewed 06/2020.              Patient encouraged to stay active.              Treatment plan discussed with the patient including medications side effects. We discussed with the patient that combining opioids, benzodiazepines, alcohol, illicit drugs or sleep aids increases the risk of respiratory depression including death. We discussed that these medications may cause drowsiness, sedation or dizziness and have counseled the patient not to drive or operate machinery. We have discussed that these medications will be prescribed only by one provider. We have discussed with the patient about age related risk factors and have thoroughly discussed the importance of taking these medications as prescribed. The patient verbalizes understanding. Patient advised regarding steps to help prevent the spread of COVID-19   SOURCE - https://chung-freed.info/. html     1-Stay home except to get medical care  2-Clean your hands often for atleast 20 seconds, avoid touching: Avoid touching your eyes, nose, and mouth with unwashed hands. 3-Seek medical attention: Seek prompt medical attention if your illness is worsening (e.g., difficulty breathing).   Call you doctor first.  3-Wear a facemask if you are sick   4-Cover your coughs and sneezes I affirm this is a Patient Initiated Episode with an established Patient who has not had a related appointment within my department in the past 7 days or scheduled within the next 24 hours.     Total Time: 11-20 minutes     Cc: Referring physician

## 2020-06-24 ENCOUNTER — TELEPHONE (OUTPATIENT)
Dept: PAIN MANAGEMENT | Age: 40
End: 2020-06-24

## 2020-06-24 NOTE — TELEPHONE ENCOUNTER
6-24-20-received a call from Angel Luis Bucio that he had an cervical MRI ordered by Dr Pato Caldera and recently had a lumbar radiofrequency on 6-11-20, the MRI department states that he can not have an MRI until he is cleared after having the procedure. Will discuss with Dr Pato Caldera and call Angel Luis Bucio back.     Alisson Rosario RN  Pain Management

## 2020-06-25 ENCOUNTER — TELEPHONE (OUTPATIENT)
Dept: PAIN MANAGEMENT | Age: 40
End: 2020-06-25

## 2020-07-08 ENCOUNTER — HOSPITAL ENCOUNTER (OUTPATIENT)
Dept: MRI IMAGING | Age: 40
Discharge: HOME | End: 2020-07-10
Payer: MEDICAID

## 2020-07-08 PROCEDURE — 72141 MRI NECK SPINE W/O DYE: CPT

## 2020-07-28 ENCOUNTER — PREP FOR PROCEDURE (OUTPATIENT)
Dept: PAIN MANAGEMENT | Age: 40
End: 2020-07-28

## 2020-07-28 ENCOUNTER — OFFICE VISIT (OUTPATIENT)
Dept: PAIN MANAGEMENT | Age: 40
End: 2020-07-28
Payer: MEDICAID

## 2020-07-28 VITALS
BODY MASS INDEX: 25.18 KG/M2 | OXYGEN SATURATION: 98 % | TEMPERATURE: 97.4 F | HEART RATE: 57 BPM | HEIGHT: 69 IN | DIASTOLIC BLOOD PRESSURE: 62 MMHG | RESPIRATION RATE: 18 BRPM | WEIGHT: 170 LBS | SYSTOLIC BLOOD PRESSURE: 118 MMHG

## 2020-07-28 PROCEDURE — 99214 OFFICE O/P EST MOD 30 MIN: CPT

## 2020-07-28 PROCEDURE — 99214 OFFICE O/P EST MOD 30 MIN: CPT | Performed by: PAIN MEDICINE

## 2020-07-28 PROCEDURE — G8419 CALC BMI OUT NRM PARAM NOF/U: HCPCS | Performed by: PAIN MEDICINE

## 2020-07-28 PROCEDURE — G8427 DOCREV CUR MEDS BY ELIG CLIN: HCPCS | Performed by: PAIN MEDICINE

## 2020-07-28 PROCEDURE — 1036F TOBACCO NON-USER: CPT | Performed by: PAIN MEDICINE

## 2020-07-28 NOTE — PROGRESS NOTES
[address]  [address] [facility], [address] [place] [address] [place]  [phone]    Follow up Note      Sophie Sabillon     Date of Visit:  7/28/2020    CC:  Patient presents for follow up   Chief Complaint   Patient presents with    Pain     neck and back        HPI:  low back pain is better, neck pain is stable. Appropriate analgesia with current medications regimen: N/A    Change in quality of symptoms:no.    Medication side effects:none. Recent diagnostic testing:cervical spine MRI. Recent interventional procedures:None     He has not been on anticoagulation medications to include none. The patient  has not been on herbal supplements.  The patient is not diabetic.     Imaging:   Cervical spine MRI 2020  Findings compatible with degenerative changes     Cervical spine Xray 2020  Findings consistent with moderate degenerative disc disease of C5-C6   and mild degenerative facet disease.      Lumbar spine MRI 10/2019   Changes within the facets are abnormal. Findings are compatible with   degenerative facet disease.           At L5-S1: There is a mild broad-based disc herniation, there is mild   canal stenosis       At L4-5: There is a mild broad-based disc herniation, there is mild   canal stenosis, slightly more prominent towards the left extending   into the left neural foramina       At L3-4: There is no significant disc herniation, there is no   significant canal stenosis.       At L2-3: There is no significant disc herniation, there is no   significant canal stenosis.       At L1-2: There is no significant disc herniation, there is no   significant canal stenosis.      Lumbar spine flexsion/extension 08/2018  L4 spondylolysis with grade 1 spondylolisthesis. Approximately 4 mm translation on flexion-extension views.      Previous treatments: Physical Therapy and medications. Mitchael Bear     Potential Aberrant Drug-Related Behavior:  No     Urine Drug Screening:  None, no opioids started      OARRS report:  2020 consistent     Past Medical History:   Diagnosis Date    Chronic back pain     Kidney stone     Neck pain      Past Surgical History:   Procedure Laterality Date    ANESTHESIA NERVE BLOCK Right 2020    RIGHT L3,4 MEDIAL BRANCH AND DORSAL RAMUS BLOCK WITHOUT SEDATION (CPT 83918,96182) performed by Alvarado Bernstein MD at [address] Right 2020    RIGHT L3,4 MEDIAL BRANCH AND L5 DORSAL RAMUS BLOCK (CPT 00431,26909) performed by Alvarado Bernstein MD at [place] [address] Right 2020    mbb and dorsal rami    NERVE BLOCK Right 2020    medial branch block    PAIN MANAGEMENT PROCEDURE Right 2020    RIGHT L3, 4 MEDIAL BRANCH AND L5 DORSAL RAMUS RADIOFREQUENCY ABLATION performed by Alvarado Bernstein MD at [address] [place] [address]     Prior to Admission medications    Medication Sig Start Date End Date Taking?  Authorizing Provider   naproxen sodium (ALEVE) 220 MG tablet Take 1 tablet by mouth 2 times daily as needed for Pain 10/25/19  Yes Milagros Gomez, DO   Tens Unit MISC by Does not apply route 18  Yes Tom Roth, DO     No Known Allergies    Social History     Socioeconomic History    Marital status:      Spouse name: Not on file    Number of children: Not on file    Years of education: Not on file    Highest education level: Not on file   Occupational History    Not on file   Social Needs    Financial resource strain: Not on file    Food insecurity     Worry: Not on file     Inability: Not on file    Transportation needs     Medical: Not on file     Non-medical: Not on file   Tobacco Use    Smoking status: Former Smoker     Packs/day: 0.50     Years: 23.00     Pack years: 11.50     Types: Cigarettes     Last attempt to quit: 2013     Years since quittin.5    Smokeless tobacco: Never Used   Substance and Sexual Activity    Alcohol use: No    Drug use: No    Sexual Reyez's:negative right, negative                left   SI joint tenderness:negative right, negative left              RADAMES test:not done right, not done             left  Piriformis tenderness:not done right, not done left  Trochanteric bursa tenderness:not done right, not done left  SLR:not done right, not done left, sitting     Extremities:    Tremors:None bilaterally upper and lower  Range of motion:Generally normal shoulders, pain with internal rotation of hips negative. Intact:Yes  Edema:Normal    Neurological:    Sensory:normal to light touch bilateral upper and lower extremities. Motor:   Right Grip5/5              Left Grip5/5               Right Bicep5/5           Left Bicep5/5              Right Triceps5/5       Left Triceps5/5          Right Deltoid5/5     Left Deltoid5/5                  Right Quadriceps5/5          Left Quadriceps5/5           Right Gastrocnemius5/5    Left Gastrocnemius5/5  Right Ant Tibialis5/5  Left Ant Tibialis5/5  Reflexes:    Right Brachioradialis reflex2+  Left Brachioradialis reflex2+  Right Biceps reflex2+  Left Biceps reflex2+  Right Triceps reflex2+  Left Triceps reflex2+  Right Quadriceps reflex2+  Left Quadriceps reflex2+  Right Achilles reflex2+  Left Achilles reflex2+  Gait:normal    Dermatology:    Skin:no unusual rashes and no skin lesions    Impression:      Chonic low back pain with radiation to the right buttocks and numbness in the Left leg  Lumbar spine MRI 2019 mild disc bulge at L4-5 with facet arthropathy  Patient had seen neurosurgery and is not a candidate for surgery  Plan:  Follow up on his low back neck pain with radiation to the right shoulder blade and numbness in the right arm. Results of cervical spine MRI were discussed with the patient. Currently the numbness in the right arm had improved.  He continues to complain of neck pain on the right side aggravated by twisting his head with positive facet tenderness on exam.  Discussed diagnostic cervcal facet MBB at C3,4,5 MBB. Patient agrees. Continues to benefit from right L3,4 MB and L5 DR TANMAY. Continue with Gabapentin 300 mg QD (no refill needed). Continue with OTC pain medications. OARRS report reviewed 0/2020.              Patient encouraged to stay active.              Treatment plan discussed with the patient including medications and procedure side effects. We discussed with the patient that combining opioids, benzodiazepines, alcohol, illicit drugs or sleep aids increases the risk of respiratory depression including death. We discussed that these medications may cause drowsiness, sedation or dizziness and have counseled the patient not to drive or operate machinery. We have discussed that these medications will be prescribed only by one provider. We have discussed with the patient about age related risk factors and have thoroughly discussed the importance of taking these medications as prescribed. The patient verbalizes understanding. ccrvinicio Avery M.D.

## 2020-09-03 ENCOUNTER — HOSPITAL ENCOUNTER (OUTPATIENT)
Age: 40
Setting detail: OUTPATIENT SURGERY
Discharge: HOME | End: 2020-09-03
Attending: PAIN MEDICINE | Admitting: PAIN MEDICINE
Payer: MEDICAID

## 2020-09-03 ENCOUNTER — HOSPITAL ENCOUNTER (OUTPATIENT)
Dept: OPERATING ROOM | Age: 40
Setting detail: OUTPATIENT SURGERY
Discharge: HOME | End: 2020-09-03
Attending: PAIN MEDICINE
Payer: MEDICAID

## 2020-09-03 VITALS
HEART RATE: 54 BPM | BODY MASS INDEX: 25.18 KG/M2 | HEIGHT: 69 IN | SYSTOLIC BLOOD PRESSURE: 115 MMHG | WEIGHT: 170 LBS | DIASTOLIC BLOOD PRESSURE: 76 MMHG | TEMPERATURE: 98.2 F | RESPIRATION RATE: 16 BRPM | OXYGEN SATURATION: 99 %

## 2020-09-03 PROCEDURE — 6360000002 HC RX W HCPCS: Performed by: PAIN MEDICINE

## 2020-09-03 PROCEDURE — 64490 INJ PARAVERT F JNT C/T 1 LEV: CPT | Performed by: PAIN MEDICINE

## 2020-09-03 PROCEDURE — 3209999900 FLUORO FOR SURGICAL PROCEDURES

## 2020-09-03 PROCEDURE — 2709999900 HC NON-CHARGEABLE SUPPLY: Performed by: PAIN MEDICINE

## 2020-09-03 PROCEDURE — 2500000003 HC RX 250 WO HCPCS: Performed by: PAIN MEDICINE

## 2020-09-03 PROCEDURE — 64491 INJ PARAVERT F JNT C/T 2 LEV: CPT | Performed by: PAIN MEDICINE

## 2020-09-03 PROCEDURE — 3600000005 HC SURGERY LEVEL 5 BASE: Performed by: PAIN MEDICINE

## 2020-09-03 PROCEDURE — 7100000010 HC PHASE II RECOVERY - FIRST 15 MIN: Performed by: PAIN MEDICINE

## 2020-09-03 RX ORDER — LIDOCAINE HYDROCHLORIDE 5 MG/ML
INJECTION, SOLUTION INFILTRATION; INTRAVENOUS PRN
Status: DISCONTINUED | OUTPATIENT
Start: 2020-09-03 | End: 2020-09-03 | Stop reason: ALTCHOICE

## 2020-09-03 ASSESSMENT — PAIN SCALES - GENERAL
PAINLEVEL_OUTOF10: 0
PAINLEVEL_OUTOF10: 0

## 2020-09-03 ASSESSMENT — PAIN DESCRIPTION - DESCRIPTORS: DESCRIPTORS: ACHING

## 2020-09-03 ASSESSMENT — PAIN - FUNCTIONAL ASSESSMENT: PAIN_FUNCTIONAL_ASSESSMENT: 0-10

## 2020-09-03 NOTE — OP NOTE
the recovery area where they remained until discharge and written discharge instructions for the procedure were given. Plan: Sergo Funes will return to our pain management center as scheduled.      German Oshea MD

## 2020-09-03 NOTE — H&P
 Smoking status: Former Smoker     Packs/day: 0.50     Years: 23.00     Pack years: 11.50     Types: Cigarettes     Last attempt to quit: 2013     Years since quittin.6    Smokeless tobacco: Never Used   Substance and Sexual Activity    Alcohol use: No    Drug use: No    Sexual activity: Not on file   Lifestyle    Physical activity     Days per week: Not on file     Minutes per session: Not on file    Stress: Not on file   Relationships    Social connections     Talks on phone: Not on file     Gets together: Not on file     Attends Voodoo service: Not on file     Active member of club or organization: Not on file     Attends meetings of clubs or organizations: Not on file     Relationship status: Not on file    Intimate partner violence     Fear of current or ex partner: Not on file     Emotionally abused: Not on file     Physically abused: Not on file     Forced sexual activity: Not on file   Other Topics Concern    Not on file   Social History Narrative    Not on file       Family History   Problem Relation Age of Onset    Heart Disease Mother     Diabetes Mother          REVIEW OF SYSTEMS:    CONSTITUTIONAL:  negative for  fevers, chills, sweats and fatigue    RESPIRATORY:  negative for  dry cough, cough with sputum, dyspnea, wheezing and chest pain    CARDIOVASCULAR:  negative for chest pain, dyspnea, palpitations, syncope    GASTROINTESTINAL:  negative for nausea, vomiting, change in bowel habits, diarrhea, constipation and abdominal pain    MUSCULOSKELETAL: negative for muscle weakness    SKIN: negative for itching or rashes.     BEHAVIOR/PSYCH:  negative for poor appetite, increased appetite, decreased sleep and poor concentration    All other systems negative      PHYSICAL EXAM:    VITALS:  /81   Pulse 63   Temp 98.2 °F (36.8 °C) (Skin)   Resp 14   Ht 5' 9\" (1.753 m)   Wt 170 lb (77.1 kg)   SpO2 98%   BMI 25.10 kg/m²     CONSTITUTIONAL:  awake, alert, cooperative, no

## 2020-09-25 ENCOUNTER — OFFICE VISIT (OUTPATIENT)
Dept: PAIN MANAGEMENT | Age: 40
End: 2020-09-25
Payer: MEDICAID

## 2020-09-25 VITALS
OXYGEN SATURATION: 98 % | TEMPERATURE: 97.5 F | SYSTOLIC BLOOD PRESSURE: 116 MMHG | RESPIRATION RATE: 16 BRPM | DIASTOLIC BLOOD PRESSURE: 80 MMHG | WEIGHT: 170 LBS | BODY MASS INDEX: 25.18 KG/M2 | HEIGHT: 69 IN | HEART RATE: 55 BPM

## 2020-09-25 PROBLEM — M43.16 SPONDYLOLISTHESIS AT L4-L5 LEVEL: Status: ACTIVE | Noted: 2020-09-25

## 2020-09-25 PROBLEM — M19.011 ARTHRITIS OF RIGHT ACROMIOCLAVICULAR JOINT: Status: ACTIVE | Noted: 2020-09-25

## 2020-09-25 PROCEDURE — G8419 CALC BMI OUT NRM PARAM NOF/U: HCPCS | Performed by: PAIN MEDICINE

## 2020-09-25 PROCEDURE — 99213 OFFICE O/P EST LOW 20 MIN: CPT | Performed by: PAIN MEDICINE

## 2020-09-25 PROCEDURE — 1036F TOBACCO NON-USER: CPT | Performed by: PAIN MEDICINE

## 2020-09-25 PROCEDURE — G8427 DOCREV CUR MEDS BY ELIG CLIN: HCPCS | Performed by: PAIN MEDICINE

## 2020-09-25 NOTE — PROGRESS NOTES
[address]  [address] [facility], [address] [place] [address]  [phone]    Follow up Note      Casey Forbes     Date of Visit:  9/25/2020    CC:  Patient presents for follow up   Chief Complaint   Patient presents with    Lower Back Pain       HPI:  Follow up on his low back and neck pain  Appropriate analgesia with current medications regimen: N/A    Change in quality of symptoms:no.    Medication side effects:none. Recent diagnostic testing:none  Recent interventional procedures:Right C3,4,5 MBB      He has not been on anticoagulation medications to include none. The patient  has not been on herbal supplements.  The patient is not diabetic.     Imaging:   Cervical spine MRI 2020  Findings compatible with degenerative changes     Cervical spine Xray 2020  Findings consistent with moderate degenerative disc disease of C5-C6   and mild degenerative facet disease.      Lumbar spine MRI 10/2019   Changes within the facets are abnormal. Findings are compatible with   degenerative facet disease.           At L5-S1: There is a mild broad-based disc herniation, there is mild   canal stenosis       At L4-5: There is a mild broad-based disc herniation, there is mild   canal stenosis, slightly more prominent towards the left extending   into the left neural foramina       At L3-4: There is no significant disc herniation, there is no   significant canal stenosis.       At L2-3: There is no significant disc herniation, there is no   significant canal stenosis.       At L1-2: There is no significant disc herniation, there is no   significant canal stenosis.      Lumbar spine flexsion/extension 08/2018  L4 spondylolysis with grade 1 spondylolisthesis. Approximately 4 mm translation on flexion-extension views.      Previous treatments: Physical Therapy and medications. Auther Luigi     Potential Aberrant Drug-Related Behavior:  No     Urine Drug Screening:  None, no opioids started      OARRS report:  09/2020 consistent     Past Medical History:   Diagnosis Date    Chronic back pain     Kidney stone     Neck pain      Past Surgical History:   Procedure Laterality Date    ANESTHESIA NERVE BLOCK Right 1/13/2020    RIGHT L3,4 MEDIAL BRANCH AND DORSAL RAMUS BLOCK WITHOUT SEDATION (CPT 45966,07431) performed by Chris Camargo MD at [address] Right 2/20/2020    RIGHT L3,4 MEDIAL BRANCH AND L5 DORSAL RAMUS BLOCK (CPT 80866,04802) performed by Chris Camargo MD at [place] [address] Right 01/13/2020    mbb and dorsal rami    NERVE BLOCK Right 02/20/2020    medial branch block    NERVE BLOCK Right 09/03/2020    NERVE BLOCK Right 9/3/2020    RIGHT C3,4,5 MEDIAL BRANCH BLOCK (CPT 99833,83392) [facility] Right 6/11/2020    RIGHT L3, 4 MEDIAL BRANCH AND L5 DORSAL RAMUS RADIOFREQUENCY ABLATION performed by Chris Camargo MD at [address] [place] [address]     Prior to Admission medications    Medication Sig Start Date End Date Taking?  Authorizing Provider   naproxen sodium (ALEVE) 220 MG tablet Take 1 tablet by mouth 2 times daily as needed for Pain 10/25/19  Yes Rocio Demarco DO   Tens Unit MISC by Does not apply route 2/9/18  Yes Tammie Cifuentes DO     No Known Allergies    Social History     Socioeconomic History    Marital status:      Spouse name: Not on file    Number of children: Not on file    Years of education: Not on file    Highest education level: Not on file   Occupational History    Not on file   Social Needs    Financial resource strain: Not on file    Food insecurity     Worry: Not on file     Inability: Not on file    Transportation needs     Medical: Not on file     Non-medical: Not on file   Tobacco Use    Smoking status: Former Smoker     Packs/day: 0.50     Years: 23.00     Pack years: 11.50     Types: Cigarettes     Last attempt to quit: 2013 Years since quittin.7    Smokeless tobacco: Never Used   Substance and Sexual Activity    Alcohol use: No    Drug use: No    Sexual activity: Not on file   Lifestyle    Physical activity     Days per week: Not on file     Minutes per session: Not on file    Stress: Not on file   Relationships    Social connections     Talks on phone: Not on file     Gets together: Not on file     Attends Sabianist service: Not on file     Active member of club or organization: Not on file     Attends meetings of clubs or organizations: Not on file     Relationship status: Not on file    Intimate partner violence     Fear of current or ex partner: Not on file     Emotionally abused: Not on file     Physically abused: Not on file     Forced sexual activity: Not on file   Other Topics Concern    Not on file   Social History Narrative    Not on file     Family History   Problem Relation Age of Onset    Heart Disease Mother     Diabetes Mother      REVIEW OF SYSTEMS:     Noel Duarte denies fever/chills, chest pain, shortness of breath, new bowel or bladder complaints. All other review of systems was negative. PHYSICAL EXAMINATION:      /80   Pulse 55   Temp 97.5 °F (36.4 °C) (Infrared)   Resp 16   Ht 5' 9\" (1.753 m)   Wt 170 lb (77.1 kg)   SpO2 98%   BMI 25.10 kg/m²     General:      General appearance:   pleasant and well-hydrated. , in mild discomfort and A & O x3  Build:Normal Weight    HEENT:    Head:normocephalic and atraumatic  Sclera: icterus absent,    Lungs:    Breathing:Breathing Pattern: regular, no distress    Abdomen:    Shape:non-distended and normal  Tenderness:none    Cervical spine:    Inspection:normal  Palpation:facet loading, right, positive and tenderness. Range of motion:abnormal moderately flexion, extension rotation right and is  painful.     Musculoskeletal:    Trigger points in Paraveteral:absent bilaterally              Reyez's:negative right, negative left provider. We have discussed with the patient about age related risk factors and have thoroughly discussed the importance of taking these medications as prescribed. The patient verbalizes understanding. danielle Dominguez M.D.

## 2020-09-25 NOTE — PROGRESS NOTES
Do you currently have any of the following:    Fever: No  Headache:  No  Cough: No  Shortness of breath: No  Exposed to anyone with these symptoms: Ana Cristina Hicks presents to the [facility] on 9/25/2020. Jacinda Hennessy is complaining of pain in his neck. The pain is intermittent. The pain is described as aching and throbbing. Pain is rated on his best day at a 3, on his worst day at a 10, and on average at a 5 on the VAS scale. He took his last dose of nothing at this time . Jacinda Hennessy does not have issues with constipation. Any procedures since your last visit: No,     He is not on NSAIDS and  is not on anticoagulation medications to include none and is managed by NA. Pacemaker or defibrilator: No Physician managing device is NA.       /80   Pulse 55   Temp 97.5 °F (36.4 °C) (Infrared)   Resp 16   Ht 5' 9\" (1.753 m)   Wt 170 lb (77.1 kg)   SpO2 98%   BMI 25.10 kg/m²      No LMP for male patient.

## 2020-10-05 ENCOUNTER — TELEPHONE (OUTPATIENT)
Dept: PHYSICAL THERAPY | Age: 40
End: 2020-10-05

## 2020-10-19 ENCOUNTER — EVALUATION (OUTPATIENT)
Dept: PHYSICAL THERAPY | Age: 40
End: 2020-10-19

## 2020-10-19 NOTE — PROGRESS NOTES
Physical Therapy Daily Treatment Note    Date: 10/19/2020  Patient Name: Terri Martin  : 1980   MRN: [id number]  DOInjury: Chronic  DOSx: None   Referring Provider: Henri Sy MD  Formerly named [facility] [place]  [address] Diagnosis:      Diagnosis Orders   1. Chronic right shoulder pain         Outcome Measure: Quick Dash: 45% Impairment    S: See eval  O: Pt given written HEP  Time 5085-8875     Visit 1 Repeat outcome measure at mid point and end. Pain 3/10     ROM See eval     Modalities            Manual 10 IR, ER, flex, scap                 Stretch      Table slides      Wall Walk Flex      Wall Walk ABD      Wall ER Stretch      Towel IR Stretch            Exercise      UBE          Pulleys - Flex      Pulleys - IR      Supine Wand Flex      Supine Wand Bench Press      Supine Wand ER/IR      Standing Wand IR      Standing Wand Scaption      Standing Wand Flex      Standing Wand ER/IR      Shrugs AROM       Pendulum Ex            Supine Flex 15 reps x 1 set with 5#     S-lying ABD 10 reps x 1 set with 5#     S-lying ER 10 reps x 1 set with 5#           Prone Flexion 10 reps x 1 set with 5#     Prone Ext 10 reps x 1 set with 5#     Prone Row with ER 10 reps x 1 set with 5#     Prone Horizontal ABD            Standing Flex      Standing Scaption       Standing ABD      Standing Shoulder Press       Functional activities To aid in ROM and strength needed for reaching , lifting ,pushing and pulling at home/work    ROWS: H       ROWS: M  \"    ROWS: L  \"    ER  \"    IR  \"    Shoulder Flex      Shoulder ABD      A:  Tolerated well. Above added to written HEP.   P: Continue with rehab plan  Steph Lee PT    Treatment Charges: Mins Units   Initial Evaluation 30 1   Re-Evaluation     Ther Exercise         TE 30 2   Manual Therapy     MT     Ther Activities        TA     Gait Training          GT     Neuro Re-education NR     Modalities     Non-Billable Service Time     Other Total Time/Units 60 3

## 2020-10-19 NOTE — PROGRESS NOTES
[facility] OUTPATIENT REHABILITATION  PHYSICAL THERAPY INITIAL EVALUATION         Date:  10/19/2020   Patient: Tammie Isaac  : 1980  MRN: [id number]  Referring Provider: Agueda Walter MD  [address] [place] [address]     Medical Diagnosis:      Diagnosis Orders   1. Chronic right shoulder pain          SUBJECTIVE:     Onset date: 4 months     Onset[de-identified] Gradual Onset    Mechanism of Injury: Pt stated that he would occasionally get R shoulder issues for years prior but it has progressively got worse the last 4 months. Previous PT: none    Medical Management for Current Problem: injections, OTC meds     Chief complaint: pain, decreased motion and decreased mobility    Behavior: condition is staying the same    Pain: constant  Current: 3/10     Best: 2/10     Worst:10/10    Symptom Type/Quality: sharp, shooting, burning   Location[de-identified] Neck: noted above right lateral neck without radiation. Provoking Activities/Positions: bending, lifting/carrying/material handling, reaching behind, shoulder elevation                 Relieving Activitie/Positions:  rest, icy hot     Disturbed Sleep: yes    Imaging results: No results found.     Past Medical History:  Past Medical History:   Diagnosis Date    Chronic back pain     Kidney stone     Neck pain      Past Surgical History:   Procedure Laterality Date    ANESTHESIA NERVE BLOCK Right 2020    RIGHT L3,4 MEDIAL BRANCH AND DORSAL RAMUS BLOCK WITHOUT SEDATION (CPT 27712,66416) performed by Agueda aWlter MD at [address] [place] [address] Right 2020    RIGHT L3,4 MEDIAL BRANCH AND L5 DORSAL RAMUS BLOCK (CPT 56252,92330) performed by Agueda Walter MD at [place] [address] Right 2020    mbb and dorsal rami    NERVE BLOCK Right 2020    medial branch block    NERVE BLOCK Right 2020    NERVE BLOCK Right 9/3/2020    RIGHT C3,4,5 MEDIAL BRANCH BLOCK (CPT [id number]) performed by Vicente Kenney MD at [address] Right 6/11/2020    RIGHT L3, 4 MEDIAL BRANCH AND L5 DORSAL RAMUS RADIOFREQUENCY ABLATION performed by Vicente Kenney MD at [address] [place] [address]       Medications:   Current Outpatient Medications   Medication Sig Dispense Refill    naproxen sodium (ALEVE) 220 MG tablet Take 1 tablet by mouth 2 times daily as needed for Pain 30 tablet 0    Tens Unit MISC by Does not apply route 1 each 0     No current facility-administered medications for this visit. Occupation: Technician . Physical demands include: lifting, carrying, pushing, pulling medium weighted items, lifting, carrying, pushing, pulling heavy weighted items, assorted work positions (kneeling, crouching, crawling, stooping), tool use. Status: full time. Exercise regimen: none    Hobbies: exercising     Patient Goals: pain relief, return to prior activity, get back to normal    Contraindications/Precautions: none    OBJECTIVE:     Observations: well nourished male    Inspection: normal orthopedic exam        Range of Motion:    Neck:    Flexion:  [x] Normal   [] Limited    Extension:  [x] Normal   [] Limited     Right Rotation: [] Normal   [x] Limited by 10 degrees   Left Rotation:  [] Normal   [x] Limited by 10 degrees   Right Side Bending: [] Normal   [x] Limited by 15 degrees   Left Side Bending: [] Normal   [x] Limited by 15 degrees      Upper Extremity:   Right:   [] Normal   [x] Limited AROM 170 flex, 170 scap, 50 IR, 90 ER   Left:   [x] Normal   [] Limited     Strength:     Neck: 4/5   R UE: 4/5   L UE: 5/5    Palpation: Tender to palpation AC joint, supraspinatus      Sensation: Intact to light touch and temperature.     Special Tests:   [] Nerve Root Compression           Right []+ / [] -    Left []+ / [] -  [] Cervical Distraction []+ / [] -    [] Spurling's Test           Right []+ / [] -    Left []+ / [] -     [] Other: []+ / [] - ASSESSMENT     Outcome Measure:   Neck Disability Index 45% disability     Problems:    Pain reported 10/10   ROM decreased   Strength decreased 4/5   Decreased functional ability with use of right upper extremity, bending, reaching, lifting, carrying    [x] There are no barriers affecting plan of care or recovery    [] Barriers to this patient's plan of care or recovery include. Domestic Concerns:  [x] No  [] Yes:    Short Term goals (2-3 weeks)   Decrease reported pain to 6/10   Increase ROM to [facility]   Increase Strength to 4+/5   Able to perform/complete the following functions/tasks: Perform ADLs, hobbies, job duties, yard work with less pain.  Neck Disability Index (NDI) 30%    Long Term goals (4-6 weeks)   Decrease reported pain to 0-4/10   Increase ROM to WNL   Increase Strength to 5/5    Able to perform/complete the following functions/tasks: Perform ADLs, hobbies, job duties, yard work with no/minimal pain.  NDI 0-25%    Independent with Home Exercise Programs    Rehab Potential: [x] Good  [] Fair  [] Poor    PLAN       Treatment Plan:   [x] Therapeutic Exercise  [x] Therapeutic Activity  [x] Neuromuscular Re-education   [] Gait Training  [] Balance Training  [] Aerobic conditioning  [x] Manual Therapy  [x] Massage/Fascial release   [] Work/Sport specific activities    [] Pain Neuroscience [x] Cold/hotpack  [] Vasocompression  [x] Electrical Stimulation  [x] Lumbar/Cervical Traction  [] Ultrasound   [] Iontophoresis: 4 mg/mL Dexamethasone Sodium Phosphate 40-80 mAmin        [x] Instruction in HEP      []  Medication allergies reviewed for use of Dexamethasone Sodium Phosphate 4mg/ml  with iontophoresis treatments. Patient is not allergic.       The following CPT codes are likely to be used in the care of this patient: 455 1011 PT Evaluation: Low Complexity , 21289 PT Re-Evaluation , [phone] Therapeutic Exercise , [id number] Neuromuscular Re-Education , [facility] Therapeutic Activities , [phone] Manual Therapy , [id number] Mechanical Traction , [id number] Electrical Stimulation      Suggested Professional Referral: [x] No  [] Yes:     Patient Education:  [x] Plans/Goals, Risks/Benefits discussed  [x] Home exercise program  Method of Education: [x] Verbal  [x] Demo  [x] Written  Comprehension of Education:  [x] Verbalizes understanding. [x] Demonstrates understanding. [] Needs Review. [] Demonstrates/verbalizes understanding of HEP/Ed previously given. Frequency:  1-2 days per week for 4-6 weeks    Patient understands diagnosis/prognosis and consents to treatment, plan and goals: [x] Yes    [] No     Thank you for the opportunity to work with your patient. If you have questions or comments, please contact me at [phone]; fax: [phone]. Electronically signed by: Robb Howard PT         Please sign Physician's Certification and return to: [address] [place] [address]  Dept: [phone]  Dept Fax: [phone] Certification / Comments     Frequency/Duration 1-2 days per week for 4-6 weeks. Certification period from 10/19/2020  to 01/17/2021. I have reviewed the Plan of Care established for skilled therapy services and certify that the services are required and that they will be provided while the patient is under my care.     Physician's Comments/Revisions:               Physician's Printed Name:                                           [de-identified] Signature:                                                               Date:

## 2020-10-19 NOTE — PROGRESS NOTES
Physical Therapy Daily Treatment Note    Date: 10/19/2020  Patient Name: Sophia Cuevas  : 1980   MRN: [id number]  [facility]: ***  DOSx: ***   Referring Provider: Chelsea Harry MD  [address] [place] [address]     Medical Diagnosis:    Diagnosis Orders   1. Chronic right shoulder pain         Outcome Measure:      S: See eval  O: Pt given HEP  Time ***     Visit ***     Pain {NUMBERS 0-10:}/10     ROM      Modalities      MH + ES                   Manual                  THEREX      UBE  NEXT     Shrugs ***     Cervical Extension ***     Cervical Flexion ***     Cervical Side Bending ***     Cervical Rotation ***     Chin Tuck ***     Chin Tuck with Rotation ***           ROWS: H      ROWS: M      ROWS: L      Punches       Low obliques with head follow      High obliques with head follow      Shoulder flexion with head follow            Supine Cervical Flexion Stretch      S-L Side Bending Stretch      Prone Cervical Extension Stretch            Seated Cervical Flexion with Scapular Protraction      Seated Cervical Extension with arms behind head      Corner Wall Stretch with Cervical Extension and Scapular Retraction      Wall Pushups with Cervical Extension            Shoulder Press DB      Shoulder Flexion DB      Shoulder Abduction DB      Shoulder ER DB            A:  Tolerated well. Above added to written HEP.   P: Continue with rehab plan  Colette Cárdenas PT    Treatment Charges: Mins Units   Initial Evaluation *** ***   Re-Evaluation     Ther Exercise         TE *** ***   Manual Therapy     MT     Ther Activities        TA     Gait Training          GT     Neuro Re-education NR     Modalities     Non-Billable Service Time     Other     Total Time/Units *** ***

## 2020-11-03 PROBLEM — M47.816 LUMBAR SPONDYLOSIS: Status: RESOLVED | Noted: 2019-12-09 | Resolved: 2020-11-03

## 2021-02-23 ENCOUNTER — OFFICE VISIT (OUTPATIENT)
Dept: PAIN MANAGEMENT | Age: 41
End: 2021-02-23
Payer: MEDICAID

## 2021-02-23 VITALS
WEIGHT: 170 LBS | HEART RATE: 96 BPM | BODY MASS INDEX: 25.18 KG/M2 | RESPIRATION RATE: 16 BRPM | OXYGEN SATURATION: 97 % | DIASTOLIC BLOOD PRESSURE: 80 MMHG | HEIGHT: 69 IN | SYSTOLIC BLOOD PRESSURE: 122 MMHG | TEMPERATURE: 97.5 F

## 2021-02-23 DIAGNOSIS — M43.16 SPONDYLOLISTHESIS OF LUMBAR REGION: ICD-10-CM

## 2021-02-23 DIAGNOSIS — M51.9 LUMBAR DISC DISORDER: ICD-10-CM

## 2021-02-23 DIAGNOSIS — M47.816 LUMBAR SPONDYLOSIS: ICD-10-CM

## 2021-02-23 DIAGNOSIS — M47.816 LUMBAR FACET ARTHROPATHY: Primary | ICD-10-CM

## 2021-02-23 DIAGNOSIS — M43.16 SPONDYLOLISTHESIS AT L4-L5 LEVEL: ICD-10-CM

## 2021-02-23 PROCEDURE — G8419 CALC BMI OUT NRM PARAM NOF/U: HCPCS | Performed by: PAIN MEDICINE

## 2021-02-23 PROCEDURE — 99213 OFFICE O/P EST LOW 20 MIN: CPT | Performed by: PAIN MEDICINE

## 2021-02-23 PROCEDURE — 99214 OFFICE O/P EST MOD 30 MIN: CPT | Performed by: PAIN MEDICINE

## 2021-02-23 PROCEDURE — 1036F TOBACCO NON-USER: CPT | Performed by: PAIN MEDICINE

## 2021-02-23 PROCEDURE — G8484 FLU IMMUNIZE NO ADMIN: HCPCS | Performed by: PAIN MEDICINE

## 2021-02-23 PROCEDURE — G8427 DOCREV CUR MEDS BY ELIG CLIN: HCPCS | Performed by: PAIN MEDICINE

## 2021-02-23 NOTE — PROGRESS NOTES
[facility]  [address] [facility], [address] [place] [address]  [phone]    Follow up Note      Rory Navas     Date of Visit:  2/23/2021    CC:  Patient presents for follow up   Chief Complaint   Patient presents with    Chronic Pain     lower back     HPI:  Office extension for his low back and neck pain with complaints of increased low back on the Left side. Appropriate analgesia with current medications regimen: N/A    Change in quality of symptoms:no.    Medication side effects:none. Recent diagnostic testing:none  Recent interventional procedures:none     He has not been on anticoagulation medications to include none. The patient  has not been on herbal supplements.  The patient is not diabetic.     Imaging:   Cervical spine MRI 2020  Findings compatible with degenerative changes     Cervical spine Xray 2020  Findings consistent with moderate degenerative disc disease of C5-C6   and mild degenerative facet disease.      Lumbar spine MRI 10/2019   Changes within the facets are abnormal. Findings are compatible with   degenerative facet disease.           At L5-S1: There is a mild broad-based disc herniation, there is mild   canal stenosis       At L4-5: There is a mild broad-based disc herniation, there is mild   canal stenosis, slightly more prominent towards the left extending   into the left neural foramina       At L3-4: There is no significant disc herniation, there is no   significant canal stenosis.       At L2-3: There is no significant disc herniation, there is no   significant canal stenosis.       At L1-2: There is no significant disc herniation, there is no   significant canal stenosis.      Lumbar spine flexsion/extension 08/2018  L4 spondylolysis with grade 1 spondylolisthesis. Approximately 4 mm translation on flexion-extension views.      Previous treatments: Physical Therapy and medications. Kylah Schneider     Potential Aberrant Drug-Related Behavior:  No     Urine Drug Screening:  None, no opioids started      OARRS report:  02/2021 consistent     Past Medical History:   Diagnosis Date    Chronic back pain     Kidney stone     Neck pain      Past Surgical History:   Procedure Laterality Date    ANESTHESIA NERVE BLOCK Right 1/13/2020    RIGHT L3,4 MEDIAL BRANCH AND DORSAL RAMUS BLOCK WITHOUT SEDATION (CPT 00537,24868) performed by Lou Wong MD at [address] Right 2/20/2020    RIGHT L3,4 MEDIAL BRANCH AND L5 DORSAL RAMUS BLOCK (CPT 67625,09666) performed by Lou Wong MD at [place] [address] Right 01/13/2020    mbb and dorsal rami    NERVE BLOCK Right 02/20/2020    medial branch block    NERVE BLOCK Right 09/03/2020    NERVE BLOCK Right 9/3/2020    RIGHT C3,4,5 MEDIAL BRANCH BLOCK (CPT 41296,15804) performed by Lou Wong MD at [facility] [address] Right 6/11/2020    RIGHT L3, 4 MEDIAL BRANCH AND L5 DORSAL RAMUS RADIOFREQUENCY ABLATION performed by Lou Wong MD at [address] [place] [address]     Prior to Admission medications    Medication Sig Start Date End Date Taking?  Authorizing Provider   naproxen sodium (ALEVE) 220 MG tablet Take 1 tablet by mouth 2 times daily as needed for Pain 10/25/19  Yes Alona Gerber DO   Tens Unit MISC by Does not apply route 2/9/18  Yes Randall Landau, DO     No Known Allergies    Social History     Socioeconomic History    Marital status:      Spouse name: Not on file    Number of children: Not on file    Years of education: Not on file    Highest education level: Not on file   Occupational History    Not on file   Social Needs    Financial resource strain: Not on file    Food insecurity     Worry: Not on file     Inability: Not on file    Transportation needs     Medical: Not on file     Non-medical: Not on file   Tobacco Use    Smoking status: Former Smoker     Packs/day: 0.50     Years: 23.00     Pack years: 11.50 Types: Cigarettes     Quit date:      Years since quittin.1    Smokeless tobacco: Never Used   Substance and Sexual Activity    Alcohol use: No    Drug use: No    Sexual activity: Not on file   Lifestyle    Physical activity     Days per week: Not on file     Minutes per session: Not on file    Stress: Not on file   Relationships    Social connections     Talks on phone: Not on file     Gets together: Not on file     Attends Anabaptism service: Not on file     Active member of club or organization: Not on file     Attends meetings of clubs or organizations: Not on file     Relationship status: Not on file    Intimate partner violence     Fear of current or ex partner: Not on file     Emotionally abused: Not on file     Physically abused: Not on file     Forced sexual activity: Not on file   Other Topics Concern    Not on file   Social History Narrative    Not on file     Family History   Problem Relation Age of Onset    Heart Disease Mother     Diabetes Mother      REVIEW OF SYSTEMS:     Mae Heidi denies fever/chills, chest pain, shortness of breath, new bowel or bladder complaints. All other review of systems was negative. PHYSICAL EXAMINATION:      /80   Pulse 96   Temp 97.5 °F (36.4 °C) (Infrared)   Resp 16   Ht 5' 9\" (1.753 m)   Wt 170 lb (77.1 kg)   SpO2 97%   BMI 25.10 kg/m²     General:      General appearance:   pleasant and well-hydrated. , in moderate discomfort and A & O x3  Build:Normal Weight    HEENT:    Head:normocephalic and atraumatic  Sclera: icterus absent,    Lungs:    Breathing:Breathing Pattern: regular, no distress    Abdomen:    Shape:non-distended and normal  Tenderness:none     Lumbar spine:    Spine inspection:normal   CVA tenderness:No   Palpation:tenderness paravertebral muscles, facet loading, left, positive and tenderness. Range of motion:abnormal moderately Lateral bending, flexion, extension rotation left and is  painful.     Musculoskeletal: Trigger points in Paraveteral:absent bilaterally   SI joint tenderness:negative right, positive left              RADAMES test:negative right, positive  left  Piriformis tenderness:negative right, negative left  Trochanteric bursa tenderness:negative right, negative left  SLR:negative right, negative left, sitting                 Extremities:    Tremors:None bilaterally upper and lower  Range of motion:normal ROM of bilateral hips. Intact:Yes  Edema:Normal    Neurological:    Sensory:normal to light touch bilateral upper and lower extremities. Motor:   Right Quadriceps5/5          Left Quadriceps5/5           Right Gastrocnemius5/5    Left Gastrocnemius5/5  Right Ant Tibialis5/5  Left Ant Tibialis5/5             Reflexes:    Right Quadriceps reflex2+  Left Quadriceps reflex2+  Right Achilles reflex2+  Left Achilles reflex2+  Gait:normal    Dermatology:    Skin:no unusual rashes and no skin lesions    Impression:    Chonic low back pain with radiation to the right buttocks and numbness in the Left leg. Lumbar spine MRI 2019 mild disc bulge at L4-5 with facet arthropathy. Patient had seen neurosurgery and is not a candidate for surgery. Good outcome with Right L3,4 MB and L5 DR RFA. Plan:  Office extension for his low back and neck pain with complaints of increased low back pain on the Left side. On exam patient has Left facet tenderness on the left side. Discussed treatment options with the patient including diagnostic lumbar facet medial branch block, patient agrees. Patient had right lumbar facet MB RFA in the past with good outcome. Continue with OTC pain medications. OARRS report reviewed 02/2021.              Patient encouraged to stay active.              Treatment plan discussed with the patient including procedure side effects. We discussed with the patient that combining opioids, benzodiazepines, alcohol, illicit drugs or sleep aids increases the risk of respiratory depression including death.  We discussed that these medications may cause drowsiness, sedation or dizziness and have counseled the patient not to drive or operate machinery. We have discussed that these medications will be prescribed only by one provider. We have discussed with the patient about age related risk factors and have thoroughly discussed the importance of taking these medications as prescribed. The patient verbalizes understanding. ccrefmaylining yokasta Foss M.D.

## 2021-02-23 NOTE — PROGRESS NOTES
Do you currently have any of the following:    Fever: No  Headache:  No  Cough: No  Shortness of breath: No  Exposed to anyone with these symptoms: No                                                                                                                Kj Cartagena presents to the [facility] on 2/23/2021. Willy Jeronimo is complaining of pain in lower back and left leg. . The pain is intermittent. The pain is described as aching. Pain is rated on his best day at a 1, on his worst day at a 8, and on average at a 5 on the VAS scale. He took his last dose of nothing at this time . Willy Jeronimo does not have issues with constipation. Any procedures since your last visit: No    He is  on NSAIDS and  is not on anticoagulation medications to include none and is managed by NA. Pacemaker or defibrillator: No Physician managing device is NA. Medication Contract and Consent for Opioid Use Documents Filed      No documents found                   /80   Pulse 96   Temp 97.5 °F (36.4 °C) (Infrared)   Resp 16   Ht 5' 9\" (1.753 m)   Wt 170 lb (77.1 kg)   SpO2 97%   BMI 25.10 kg/m²      No LMP for male patient.

## 2021-03-03 ENCOUNTER — HOSPITAL ENCOUNTER (OUTPATIENT)
Age: 41
Discharge: HOME | End: 2021-03-05
Payer: MEDICAID

## 2021-03-03 DIAGNOSIS — M47.816 LUMBAR SPONDYLOSIS: ICD-10-CM

## 2021-03-03 PROCEDURE — U0003 INFECTIOUS AGENT DETECTION BY NUCLEIC ACID (DNA OR RNA); SEVERE ACUTE RESPIRATORY SYNDROME CORONAVIRUS 2 (SARS-COV-2) (CORONAVIRUS DISEASE [COVID-19]), AMPLIFIED PROBE TECHNIQUE, MAKING USE OF HIGH THROUGHPUT TECHNOLOGIES AS DESCRIBED BY CMS-2020-01-R: HCPCS

## 2021-03-04 LAB
SARS-COV-2: NOT DETECTED
SOURCE: NORMAL

## 2021-03-08 ENCOUNTER — HOSPITAL ENCOUNTER (OUTPATIENT)
Age: 41
Setting detail: OUTPATIENT SURGERY
Discharge: HOME | End: 2021-03-08
Attending: PAIN MEDICINE | Admitting: PAIN MEDICINE
Payer: MEDICAID

## 2021-03-08 ENCOUNTER — HOSPITAL ENCOUNTER (OUTPATIENT)
Dept: OPERATING ROOM | Age: 41
Setting detail: OUTPATIENT SURGERY
Discharge: HOME | End: 2021-03-08
Attending: PAIN MEDICINE
Payer: MEDICAID

## 2021-03-08 VITALS
WEIGHT: 170 LBS | RESPIRATION RATE: 14 BRPM | DIASTOLIC BLOOD PRESSURE: 77 MMHG | HEART RATE: 69 BPM | BODY MASS INDEX: 25.18 KG/M2 | TEMPERATURE: 97.4 F | OXYGEN SATURATION: 99 % | SYSTOLIC BLOOD PRESSURE: 120 MMHG | HEIGHT: 69 IN

## 2021-03-08 DIAGNOSIS — M47.896 OTHER OSTEOARTHRITIS OF SPINE, LUMBAR REGION: ICD-10-CM

## 2021-03-08 DIAGNOSIS — M47.816 LUMBAR SPONDYLOSIS: Primary | ICD-10-CM

## 2021-03-08 PROCEDURE — 3600000005 HC SURGERY LEVEL 5 BASE: Performed by: PAIN MEDICINE

## 2021-03-08 PROCEDURE — 2500000003 HC RX 250 WO HCPCS: Performed by: PAIN MEDICINE

## 2021-03-08 PROCEDURE — 7100000011 HC PHASE II RECOVERY - ADDTL 15 MIN: Performed by: PAIN MEDICINE

## 2021-03-08 PROCEDURE — 6360000002 HC RX W HCPCS: Performed by: PAIN MEDICINE

## 2021-03-08 PROCEDURE — 2709999900 HC NON-CHARGEABLE SUPPLY: Performed by: PAIN MEDICINE

## 2021-03-08 PROCEDURE — 64494 INJ PARAVERT F JNT L/S 2 LEV: CPT | Performed by: PAIN MEDICINE

## 2021-03-08 PROCEDURE — 7100000010 HC PHASE II RECOVERY - FIRST 15 MIN: Performed by: PAIN MEDICINE

## 2021-03-08 PROCEDURE — 64493 INJ PARAVERT F JNT L/S 1 LEV: CPT | Performed by: PAIN MEDICINE

## 2021-03-08 PROCEDURE — 3209999900 FLUORO FOR SURGICAL PROCEDURES

## 2021-03-08 RX ORDER — LIDOCAINE HYDROCHLORIDE 5 MG/ML
INJECTION, SOLUTION INFILTRATION; INTRAVENOUS PRN
Status: DISCONTINUED | OUTPATIENT
Start: 2021-03-08 | End: 2021-03-08 | Stop reason: ALTCHOICE

## 2021-03-08 ASSESSMENT — PAIN SCALES - GENERAL
PAINLEVEL_OUTOF10: 0
PAINLEVEL_OUTOF10: 0

## 2021-03-08 ASSESSMENT — PAIN DESCRIPTION - DESCRIPTORS: DESCRIPTORS: ACHING

## 2021-03-08 NOTE — OP NOTE
3/8/2021    Patient: Adan Mendoza  :  1980  Age:  41 y.o. Sex:  male     PRE-OPERATIVE DIAGNOSIS: Left Lumbar spondylosis, lumbar facet syndrome. POST-OPERATIVE DIAGNOSIS: Same. PROCEDURE:  # 1 Fluoroscopic guided lumbar medial branch blocks Left at Levels: L3, L4, L5  SURGEON: BRAYAN Brown M.D. ANESTHESIA: Local    ESTIMATED BLOOD LOSS: None.  ______________________________________________________________________  BRIEF HISTORY:  Adan Mendoza comes in today for 1 fluoroscopic guided lumbar medial branch blocks  Left  at Levels: L3, L4, L5  The potential complications of this procedure were discussed with him again today. He has elected to undergo the aforementioned procedure. Marycarmen complete History & Physical examination were reviewed in depth, a copy of which is in the chart. DESCRIPTION OF PROCEDURE:   After confirming written and informed consent, a time-out was performed and Marycarmen name and date of birth, the procedure to be performed as well as the plan for the location of the needle insertion were confirmed. The patient was brought into the procedure room and placed in the prone position on the fluoroscopy table. Standard monitors were placed and vital signs were observed throughout the procedure. The area of the lumbar spine was prepped with chloraprep and draped in a sterile manner. AP fluoroscopy was used to identify and frank bartons point at the targeted levels. The skin and subcutaneous tissues in these identified areas were anesthetized with 0.5%Lidocaine. A 22 # gauge 3 1/2 spinal needle was advanced toward the junction of the superior articular process and the transverse process, along the course of the medial branch. Satisfactory needle placement was confirmed by AP and oblique projections.   At the sacral alar level, the sacral alar region was visualized and the needle tip was positioned on the sacral alar at the base of the superior articulating process where the medial branch traverses under fluoroscopic guidance. Once bone was contacted and negative aspiration was confirmed. A solution of 0.25% marcaine with 40 DepoMedrol 3 cc was then injected and distributed equally at each level. Following the procedure Bob Teran noted improvement of previous pain symptoms. Disposition the patient tolerated the procedure well and there were no complications . Vital signs remained stable throughout the procedure. The patient was escorted to the recovery area where they remained until discharge and written discharge instructions for the procedure were given. Plan: Bob Teran will return to our pain management center as scheduled.      Nettie Grissom MD

## 2021-03-08 NOTE — H&P
[facility]  [address] [facility], [address] [place] [address]  [phone]    Procedure History & Physical      Adan Mendoza     HPI:    Patient  is here for axial low back pain for Left L3,4 MB and L5 DR Block. Labs/imaging studies reviewed   All question and concerns addressed including R/B/A associated with the procedure    Past Medical History:   Diagnosis Date    Chronic back pain     Kidney stone     Neck pain        Past Surgical History:   Procedure Laterality Date    ANESTHESIA NERVE BLOCK Right 1/13/2020    RIGHT L3,4 MEDIAL BRANCH AND DORSAL RAMUS BLOCK WITHOUT SEDATION (CPT 36730,63086) performed by Aquiles Duran MD at [address] [place] [address] Right 2/20/2020    RIGHT L3,4 MEDIAL BRANCH AND L5 DORSAL RAMUS BLOCK (CPT 47830,43769) performed by Aquiles Duran MD at [place] [address] Right 01/13/2020    mbb and dorsal rami    NERVE BLOCK Right 02/20/2020    medial branch block    NERVE BLOCK Right 09/03/2020    NERVE BLOCK Right 9/3/2020    RIGHT C3,4,5 MEDIAL BRANCH BLOCK (CPT 90326,34828) performed by Aquiles Duran MD at [address] [place] [address] Right 6/11/2020    RIGHT L3, 4 MEDIAL BRANCH AND L5 DORSAL RAMUS RADIOFREQUENCY ABLATION performed by Aquiles Duran MD at [address] [place] [address]       Prior to Admission medications    Medication Sig Start Date End Date Taking?  Authorizing Provider   naproxen sodium (ALEVE) 220 MG tablet Take 1 tablet by mouth 2 times daily as needed for Pain 10/25/19   Yair Johnson, DO   Tens Unit MISC by Does not apply route 2/9/18   Miryam Enriquez, DO       No Known Allergies    Social History     Socioeconomic History    Marital status:      Spouse name: Not on file    Number of children: Not on file    Years of education: Not on file    Highest education level: Not on file   Occupational History    Not on file   Social Needs    Financial resource strain: Not on file    Food insecurity     Worry: Not on file     Inability: Not on file    Transportation needs     Medical: Not on file     Non-medical: Not on file   Tobacco Use    Smoking status: Former Smoker     Packs/day: 0.50     Years: 23.00     Pack years: 11.50     Types: Cigarettes     Quit date:      Years since quittin.1    Smokeless tobacco: Never Used   Substance and Sexual Activity    Alcohol use: No    Drug use: No    Sexual activity: Not on file   Lifestyle    Physical activity     Days per week: Not on file     Minutes per session: Not on file    Stress: Not on file   Relationships    Social connections     Talks on phone: Not on file     Gets together: Not on file     Attends Uatsdin service: Not on file     Active member of club or organization: Not on file     Attends meetings of clubs or organizations: Not on file     Relationship status: Not on file    Intimate partner violence     Fear of current or ex partner: Not on file     Emotionally abused: Not on file     Physically abused: Not on file     Forced sexual activity: Not on file   Other Topics Concern    Not on file   Social History Narrative    Not on file       Family History   Problem Relation Age of Onset    Heart Disease Mother     Diabetes Mother          REVIEW OF SYSTEMS:    CONSTITUTIONAL:  negative for  fevers, chills, sweats and fatigue    RESPIRATORY:  negative for  dry cough, cough with sputum, dyspnea, wheezing and chest pain    CARDIOVASCULAR:  negative for chest pain, dyspnea, palpitations, syncope    GASTROINTESTINAL:  negative for nausea, vomiting, change in bowel habits, diarrhea, constipation and abdominal pain    MUSCULOSKELETAL: negative for muscle weakness    SKIN: negative for itching or rashes.     BEHAVIOR/PSYCH:  negative for poor appetite, increased appetite, decreased sleep and poor concentration    All other systems negative      PHYSICAL EXAM:    VITALS:  /73   Pulse 64   Temp 97.4 °F (36.3 °C) (Skin)   Resp 14   Ht 5' 9\" (1.753 m)   Wt 170 lb (77.1 kg)   SpO2 98%   BMI 25.10 kg/m²     CONSTITUTIONAL:  awake, alert, cooperative, no apparent distress, and appears stated age    EYES: PERRLA, EOMI    LUNGS:  No increased work of breathing, no audible wheezing    CARDIOVASCULAR:  regular rate and rhythm    ABDOMEN:  Soft non tender non distended     EXTREMITIES: no signs of clubbing or cyanosis. MUSCULOSKELETAL: negative for flaccid muscle tone or spastic movements. SKIN: gross examination reveals no signs of rashes, or diaphoresis. NEURO: Cranial nerves II-XII grossly intact. No signs of agitated mood. Assessment/Plan:    Axial low back pain for Left L3,4 MB and L5 DR block.

## 2021-03-23 ENCOUNTER — OFFICE VISIT (OUTPATIENT)
Dept: PAIN MANAGEMENT | Age: 41
End: 2021-03-23
Payer: MEDICAID

## 2021-03-23 VITALS
TEMPERATURE: 97.3 F | DIASTOLIC BLOOD PRESSURE: 70 MMHG | HEART RATE: 76 BPM | BODY MASS INDEX: 25.62 KG/M2 | SYSTOLIC BLOOD PRESSURE: 120 MMHG | WEIGHT: 173 LBS | RESPIRATION RATE: 18 BRPM | HEIGHT: 69 IN

## 2021-03-23 DIAGNOSIS — M19.011 ARTHRITIS OF RIGHT ACROMIOCLAVICULAR JOINT: ICD-10-CM

## 2021-03-23 DIAGNOSIS — M47.812 CERVICAL FACET JOINT SYNDROME: ICD-10-CM

## 2021-03-23 DIAGNOSIS — G89.29 CHRONIC RIGHT SHOULDER PAIN: ICD-10-CM

## 2021-03-23 DIAGNOSIS — M47.812 CERVICAL SPONDYLOSIS: ICD-10-CM

## 2021-03-23 DIAGNOSIS — M47.816 LUMBAR FACET ARTHROPATHY: ICD-10-CM

## 2021-03-23 DIAGNOSIS — M25.511 CHRONIC RIGHT SHOULDER PAIN: ICD-10-CM

## 2021-03-23 DIAGNOSIS — M47.816 LUMBAR SPONDYLOSIS: ICD-10-CM

## 2021-03-23 DIAGNOSIS — M43.16 SPONDYLOLISTHESIS AT L4-L5 LEVEL: Primary | ICD-10-CM

## 2021-03-23 DIAGNOSIS — M51.9 LUMBAR DISC DISORDER: ICD-10-CM

## 2021-03-23 DIAGNOSIS — M43.16 SPONDYLOLISTHESIS OF LUMBAR REGION: ICD-10-CM

## 2021-03-23 DIAGNOSIS — M54.12 CERVICAL RADICULOPATHY: ICD-10-CM

## 2021-03-23 PROCEDURE — 1036F TOBACCO NON-USER: CPT | Performed by: PAIN MEDICINE

## 2021-03-23 PROCEDURE — G8428 CUR MEDS NOT DOCUMENT: HCPCS | Performed by: PAIN MEDICINE

## 2021-03-23 PROCEDURE — G8484 FLU IMMUNIZE NO ADMIN: HCPCS | Performed by: PAIN MEDICINE

## 2021-03-23 PROCEDURE — G8419 CALC BMI OUT NRM PARAM NOF/U: HCPCS | Performed by: PAIN MEDICINE

## 2021-03-23 PROCEDURE — 99213 OFFICE O/P EST LOW 20 MIN: CPT

## 2021-03-23 PROCEDURE — 99213 OFFICE O/P EST LOW 20 MIN: CPT | Performed by: PAIN MEDICINE

## 2021-03-23 NOTE — PROGRESS NOTES
[address]  [address] [facility], [address] [place] [address] [place]  [phone]    Follow up Note      Uma Moran     Date of Visit:  3/23/2021    CC:  Patient presents for follow up   Chief Complaint   Patient presents with    Follow-up     HPI:  Follow up on his low back pain with no acute issues. Appropriate analgesia with current medications regimen: N/A    Change in quality of symptoms:no.    Medication side effects:none. Recent diagnostic testing:none  Recent interventional procedures:Left L3,4 MB and L5 DR block.     He has not been on anticoagulation medications to include none. The patient  has not been on herbal supplements.  The patient is not diabetic.     Imaging:   Cervical spine MRI 2020  Findings compatible with degenerative changes     Cervical spine Xray 2020  Findings consistent with moderate degenerative disc disease of C5-C6   and mild degenerative facet disease.      Lumbar spine MRI 10/2019   Changes within the facets are abnormal. Findings are compatible with   degenerative facet disease.           At L5-S1: There is a mild broad-based disc herniation, there is mild   canal stenosis       At L4-5: There is a mild broad-based disc herniation, there is mild   canal stenosis, slightly more prominent towards the left extending   into the left neural foramina       At L3-4: There is no significant disc herniation, there is no   significant canal stenosis.       At L2-3: There is no significant disc herniation, there is no   significant canal stenosis.       At L1-2: There is no significant disc herniation, there is no   significant canal stenosis.      Lumbar spine flexsion/extension 08/2018  L4 spondylolysis with grade 1 spondylolisthesis. Approximately 4 mm translation on flexion-extension views.      Previous treatments: Physical Therapy and medications. Liz Leida     Potential Aberrant Drug-Related Behavior:  No     Urine Drug Screening:  None, no opioids started     OARRS report:  03/2021 consistent     Opioid agreement:  Date started:N/A  Renewal date:N/A    Past Medical History:   Diagnosis Date    Chronic back pain     Kidney stone     Neck pain      Past Surgical History:   Procedure Laterality Date    ANESTHESIA NERVE BLOCK Right 1/13/2020    RIGHT L3,4 MEDIAL BRANCH AND DORSAL RAMUS BLOCK WITHOUT SEDATION (CPT 64761,84453) performed by Emelia Barthel, MD at [address] [place] [address] Right 2/20/2020    RIGHT L3,4 MEDIAL BRANCH AND L5 DORSAL RAMUS BLOCK (CPT 24168,89890) performed by Emelia Barthel, MD at [place] [address] Right 01/13/2020    mbb and dorsal rami    NERVE BLOCK Right 02/20/2020    medial branch block    NERVE BLOCK Right 09/03/2020    NERVE BLOCK Right 9/3/2020    RIGHT C3,4,5 MEDIAL BRANCH BLOCK (CPT 52711,24555) performed by Emelia Barthel, MD at [facility] Left 03/08/2021    Left L3, 4 Medial branch and L5 dorsal ramus block    NERVE BLOCK Left 3/8/2021    LEFT L3,4 MEDIAL BRANCH AND L5 DORSAL RAMUS BLOCK (CPT [id number]) performed by Emelia Barthel, MD at [address] [place] [address] [address] Right 6/11/2020    RIGHT L3, 4 MEDIAL BRANCH AND L5 DORSAL RAMUS RADIOFREQUENCY ABLATION performed by Emelia Barthel, MD at [address] [address]     Prior to Admission medications    Medication Sig Start Date End Date Taking?  Authorizing Provider   naproxen sodium (ALEVE) 220 MG tablet Take 1 tablet by mouth 2 times daily as needed for Pain 10/25/19   Morgan vEans, DO   Tens Unit MISC by Does not apply route 2/9/18   Cindy Octymanuel Enriquez, DO     No Known Allergies    Social History     Socioeconomic History    Marital status:      Spouse name: Not on file    Number of children: Not on file    Years of education: Not on file    Highest education level: Not on file   Occupational History    Not on file   Social Needs    Financial resource strain: Not on file    Food insecurity Worry: Not on file     Inability: Not on file    Transportation needs     Medical: Not on file     Non-medical: Not on file   Tobacco Use    Smoking status: Former Smoker     Packs/day: 0.50     Years: 23.00     Pack years: 11.50     Types: Cigarettes     Quit date:      Years since quittin.2    Smokeless tobacco: Never Used   Substance and Sexual Activity    Alcohol use: No    Drug use: No    Sexual activity: Not on file   Lifestyle    Physical activity     Days per week: Not on file     Minutes per session: Not on file    Stress: Not on file   Relationships    Social connections     Talks on phone: Not on file     Gets together: Not on file     Attends Zoroastrianism service: Not on file     Active member of club or organization: Not on file     Attends meetings of clubs or organizations: Not on file     Relationship status: Not on file    Intimate partner violence     Fear of current or ex partner: Not on file     Emotionally abused: Not on file     Physically abused: Not on file     Forced sexual activity: Not on file   Other Topics Concern    Not on file   Social History Narrative    Not on file     Family History   Problem Relation Age of Onset    Heart Disease Mother     Diabetes Mother      REVIEW OF SYSTEMS:     Loly Austin denies fever/chills, chest pain, shortness of breath, new bowel or bladder complaints. All other review of systems was negative. PHYSICAL EXAMINATION:      /70   Pulse 76   Temp 97.3 °F (36.3 °C)   Resp 18   Ht 5' 9\" (1.753 m)   Wt 173 lb (78.5 kg)   BMI 25.55 kg/m²     General:      General appearance:   pleasant and well-hydrated.    , in mild discomfort and A & O x3  Build:Normal Weight    HEENT:    Head:normocephalic and atraumatic  Sclera: icterus absent,    Lungs:    Breathing:Breathing Pattern: regular, no distress    Abdomen:    Shape:non-distended and normal  Tenderness:none     Lumbar spine:    Spine inspection:normal   CVA tenderness:No Palpation:mild left facet tenderness  Range of motion:improved ROM. Musculoskeletal:    Trigger points in Paraveteral:absent bilaterally   SI joint tenderness:negative right, positive left              RADAMES test:negative right, positive  left  SLR:negative right, negative left, sitting                 Extremities:    Tremors:None bilaterally upper and lower  Range of motion:normal ROM of bilateral hips. Intact:Yes  Edema:Normal    Neurological:    Sensory:normal to light touch bilateral upper and lower extremities. Motor:   Right Quadriceps5/5          Left Quadriceps5/5           Right Gastrocnemius5/5    Left Gastrocnemius5/5  Right Ant Tibialis5/5  Left Ant Tibialis5/5             Reflexes:    Right Quadriceps reflex2+  Left Quadriceps reflex2+  Right Achilles reflex2+  Left Achilles reflex2+  Gait:normal    Dermatology:    Skin:no unusual rashes and no skin lesions    Impression:    Chonic low back pain with radiation to the right buttocks and numbness in the Left leg. Lumbar spine MRI 2019 mild disc bulge at L4-5 with facet arthropathy. Patient had seen neurosurgery and is not a candidate for surgery. Good outcome with Right L3,4 MB and L5 DR RFA. Plan:  Follow up on his low back pain with no acute issues. Patient is s/p Left L3,4 MB and L5 DR block with excellent relief. Will repeat when his pain worsens and if his response is consistent with first procedure will proceed with RFA. Continues to benefit from Right lumbar facet RFA. Continue with OTC pain medications. OARRS report reviewed 03/2021.              Patient encouraged to stay active.              Treatment plan discussed with the patient including procedure side effects. We discussed with the patient that combining opioids, benzodiazepines, alcohol, illicit drugs or sleep aids increases the risk of respiratory depression including death.  We discussed that these medications may cause drowsiness, sedation or dizziness and have counseled the patient not to drive or operate machinery. We have discussed that these medications will be prescribed only by one provider. We have discussed with the patient about age related risk factors and have thoroughly discussed the importance of taking these medications as prescribed. The patient verbalizes understanding. ccrefmaylining yokasta Rodriguez M.D.

## 2021-03-23 NOTE — PROGRESS NOTES
Do you currently have any of the following:    Fever: No  Headache:  No  Cough: No  Shortness of breath: No  Exposed to anyone with these symptoms: No                                                                                                                Rickey Sawyer presents to the [facility] on 3/23/2021. Suzie Villegas is complaining of pain left low back pain. The pain is no pain at this time. The pain is described as none. Pain is rated on his best day at a 0, on his worst day at a 0, and on average at a 0 on the VAS scale. He took his last dose of none . Suzie Villegas does not have issues with constipation. Any procedures since your last visit: Yes, with 100 % relief. He is not on NSAIDS and  is not on anticoagulation medications . Pacemaker or defibrillator: No     Medication Contract and Consent for Opioid Use Documents Filed      No documents found                   /70   Pulse 76   Temp 97.3 °F (36.3 °C)   Resp 18   Ht 5' 9\" (1.753 m)   Wt 173 lb (78.5 kg)   BMI 25.55 kg/m²      No LMP for male patient.

## 2021-05-25 ENCOUNTER — OFFICE VISIT (OUTPATIENT)
Dept: PAIN MANAGEMENT | Age: 41
End: 2021-05-25
Payer: MEDICAID

## 2021-05-25 VITALS
DIASTOLIC BLOOD PRESSURE: 64 MMHG | RESPIRATION RATE: 16 BRPM | HEIGHT: 69 IN | TEMPERATURE: 97.5 F | WEIGHT: 170 LBS | BODY MASS INDEX: 25.18 KG/M2 | OXYGEN SATURATION: 98 % | SYSTOLIC BLOOD PRESSURE: 112 MMHG | HEART RATE: 74 BPM

## 2021-05-25 DIAGNOSIS — M47.816 LUMBAR SPONDYLOSIS: ICD-10-CM

## 2021-05-25 DIAGNOSIS — M54.12 CERVICAL RADICULOPATHY: ICD-10-CM

## 2021-05-25 DIAGNOSIS — M25.511 CHRONIC RIGHT SHOULDER PAIN: ICD-10-CM

## 2021-05-25 DIAGNOSIS — M43.16 SPONDYLOLISTHESIS AT L4-L5 LEVEL: ICD-10-CM

## 2021-05-25 DIAGNOSIS — M47.816 LUMBAR FACET ARTHROPATHY: ICD-10-CM

## 2021-05-25 DIAGNOSIS — M47.812 CERVICAL SPONDYLOSIS: ICD-10-CM

## 2021-05-25 DIAGNOSIS — M51.9 LUMBAR DISC DISORDER: ICD-10-CM

## 2021-05-25 DIAGNOSIS — M47.812 CERVICAL FACET JOINT SYNDROME: ICD-10-CM

## 2021-05-25 DIAGNOSIS — G89.29 CHRONIC RIGHT SHOULDER PAIN: ICD-10-CM

## 2021-05-25 DIAGNOSIS — M43.16 SPONDYLOLISTHESIS OF LUMBAR REGION: Primary | ICD-10-CM

## 2021-05-25 PROCEDURE — 99213 OFFICE O/P EST LOW 20 MIN: CPT | Performed by: PAIN MEDICINE

## 2021-05-25 PROCEDURE — G8427 DOCREV CUR MEDS BY ELIG CLIN: HCPCS | Performed by: PAIN MEDICINE

## 2021-05-25 PROCEDURE — 1036F TOBACCO NON-USER: CPT | Performed by: PAIN MEDICINE

## 2021-05-25 PROCEDURE — G8419 CALC BMI OUT NRM PARAM NOF/U: HCPCS | Performed by: PAIN MEDICINE

## 2021-05-25 NOTE — PROGRESS NOTES
Do you currently have any of the following:    Fever: No  Headache:  No  Cough: No  Shortness of breath: No  Exposed to anyone with these symptoms: No                                                                                                                Meagan Gilman presents to the [facility] on 5/25/2021. Jessi Ward is complaining of pain in his low back. The pain is constant. The pain is described as pinching pain. Pain is rated on his best day at a 1, on his worst day at a 8, and on average at a 4 on the VAS scale. He took his last dose of takes nothing . Jessi Ward does not have issues with constipation. Any procedures since your last visit: 3-8-21-LEFT L3,4 MEDIAL BRANCH AND L5 DORSAL RAMUS BLOCK 90 percent relief    He is not on NSAIDS and  is not on anticoagulation medications to include none. Pacemaker or defibrillator: No.    Medication Contract and Consent for Opioid Use Documents Filed      No documents found                   /64   Pulse 74   Temp 97.5 °F (36.4 °C) (Temporal)   Resp 16   Ht 5' 9\" (1.753 m)   Wt 170 lb (77.1 kg)   SpO2 98%   BMI 25.10 kg/m²      No LMP for male patient.

## 2021-05-25 NOTE — PROGRESS NOTES
[address]  [address] [facility], [address] [place] [address] [place]  [phone]    Follow up Note      Ryanne Guerra     Date of Visit:  5/25/2021    CC:  Patient presents for follow up   Chief Complaint   Patient presents with    Follow-up    Back Pain     HPI:  Follow up on his low back pain with increased right sided low back pain  Appropriate analgesia with current medications regimen: N/A    Change in quality of symptoms:no.    Medication side effects:none. Recent diagnostic testing:none  Recent interventional procedures:none     He has not been on anticoagulation medications to include none. The patient  has not been on herbal supplements.  The patient is not diabetic.     Imaging:   Cervical spine MRI 2020  Findings compatible with degenerative changes     Cervical spine Xray 2020  Findings consistent with moderate degenerative disc disease of C5-C6   and mild degenerative facet disease.      Lumbar spine MRI 10/2019   Changes within the facets are abnormal. Findings are compatible with   degenerative facet disease.           At L5-S1: There is a mild broad-based disc herniation, there is mild   canal stenosis       At L4-5: There is a mild broad-based disc herniation, there is mild   canal stenosis, slightly more prominent towards the left extending   into the left neural foramina       At L3-4: There is no significant disc herniation, there is no   significant canal stenosis.       At L2-3: There is no significant disc herniation, there is no   significant canal stenosis.       At L1-2: There is no significant disc herniation, there is no   significant canal stenosis.      Lumbar spine flexsion/extension 08/2018  L4 spondylolysis with grade 1 spondylolisthesis. Approximately 4 mm translation on flexion-extension views.      Previous treatments: Physical Therapy and medications. James Olmstead     Potential Aberrant Drug-Related Behavior:  No     Urine Drug Screening:  None, no opioids started      OARRS report:  05/2021 consistent     Opioid agreement:  Date started:N/A  Renewal date:N/A    Past Medical History:   Diagnosis Date    Chronic back pain     Kidney stone     Low back pain     Neck pain      Past Surgical History:   Procedure Laterality Date    ANESTHESIA NERVE BLOCK Right 1/13/2020    RIGHT L3,4 MEDIAL BRANCH AND DORSAL RAMUS BLOCK WITHOUT SEDATION (CPT 78743,56172) performed by Raiza Bowling MD at [address] [place] [address] Right 2/20/2020    RIGHT L3,4 MEDIAL BRANCH AND L5 DORSAL RAMUS BLOCK (CPT 91101,08339) performed by Raiza Bowling MD at [place] [address] Right 01/13/2020    mbb and dorsal rami    NERVE BLOCK Right 02/20/2020    medial branch block    NERVE BLOCK Right 09/03/2020    NERVE BLOCK Right 9/3/2020    RIGHT C3,4,5 MEDIAL BRANCH BLOCK (CPT [id number],41512) performed by Raiza Bowling MD at [facility] Left 03/08/2021    Left L3, 4 Medial branch and L5 dorsal ramus block    NERVE BLOCK Left 3/8/2021    LEFT L3,4 MEDIAL BRANCH AND L5 DORSAL RAMUS BLOCK (CPT [id number]) performed by Raiza Bowling MD at [address] [place] [address] [address] Right 6/11/2020    RIGHT L3, 4 MEDIAL BRANCH AND L5 DORSAL RAMUS RADIOFREQUENCY ABLATION performed by Raiza Bowling MD at [address] [place] [address]     Prior to Admission medications    Medication Sig Start Date End Date Taking?  Authorizing Provider   naproxen sodium (ALEVE) 220 MG tablet Take 1 tablet by mouth 2 times daily as needed for Pain 10/25/19  Yes Souleymane Wan DO   Tens Unit MISC by Does not apply route 2/9/18  Yes Mima Enriquez DO     No Known Allergies    Social History     Socioeconomic History    Marital status:      Spouse name: Not on file    Number of children: Not on file    Years of education: Not on file    Highest education level: Not on file   Occupational History    Not on file   Tobacco Use    Smoking status: Former Smoker     Packs/day: 0.50     Years: 23.00     Pack years: 11.50     Types: Cigarettes     Quit date:      Years since quittin.4    Smokeless tobacco: Never Used   Vaping Use    Vaping Use: Never used   Substance and Sexual Activity    Alcohol use: No    Drug use: No    Sexual activity: Yes     Partners: Female   Other Topics Concern    Not on file   Social History Narrative    Not on file     Social Determinants of Health     Financial Resource Strain:     Difficulty of Paying Living Expenses:    Food Insecurity:     Worried About Running Out of Food in the Last Year:     [address] in the Last Year:    Transportation Needs:     Lack of Transportation (Medical):  Lack of Transportation (Non-Medical):    Physical Activity:     Days of Exercise per Week:     Minutes of Exercise per Session:    Stress:     Feeling of Stress :    Social Connections:     Frequency of Communication with Friends and Family:     Frequency of Social Gatherings with Friends and Family:     Attends Latter-day Services:     Active Member of Clubs or Organizations:     Attends Club or Organization Meetings:     Marital Status:    Intimate Partner Violence:     Fear of Current or Ex-Partner:     Emotionally Abused:     Physically Abused:     Sexually Abused:      Family History   Problem Relation Age of Onset    Heart Disease Mother     Diabetes Mother      REVIEW OF SYSTEMS:     Vesna Merritt denies fever/chills, chest pain, shortness of breath, new bowel or bladder complaints. All other review of systems was negative. PHYSICAL EXAMINATION:      /64   Pulse 74   Temp 97.5 °F (36.4 °C) (Temporal)   Resp 16   Ht 5' 9\" (1.753 m)   Wt 170 lb (77.1 kg)   SpO2 98%   BMI 25.10 kg/m²     General:      General appearance:   pleasant and well-hydrated.    , in moderate discomfort and A & O x3  Build:Normal Weight    HEENT:    Head:normocephalic and atraumatic  Sclera: icterus absent,    Lungs:    Breathing:Breathing Pattern: regular, no distress    Abdomen:    Shape:non-distended and normal  Tenderness:none     Lumbar spine:    Spine inspection:normal   CVA tenderness:No   Palpation:mild left facet tenderness, increased right facet tenderness. Range of motion:limited ROM    Musculoskeletal:    Trigger points in Paraveteral:absent bilaterally   SI joint tenderness:negative right, positive left              RADAMES test:negative right, positive  left  SLR:negative right, negative left, sitting                 Extremities:    Tremors:None bilaterally upper and lower  Range of motion:normal ROM of bilateral hips. Intact:Yes  Edema:Normal    Neurological:    Sensory:normal to light touch bilateral upper and lower extremities. Motor:   Right Quadriceps5/5          Left Quadriceps5/5           Right Gastrocnemius5/5    Left Gastrocnemius5/5  Right Ant Tibialis5/5  Left Ant Tibialis5/5             Reflexes:    Right Quadriceps reflex2+  Left Quadriceps reflex2+  Right Achilles reflex2+  Left Achilles reflex2+  Gait:normal    Dermatology:    Skin:no unusual rashes and no skin lesions    Impression:    Chonic low back pain with radiation to the right buttocks and numbness in the Left leg. Lumbar spine MRI 2019 mild disc bulge at L4-5 with facet arthropathy. Patient had seen neurosurgery and is not a candidate for surgery. Good outcome with Right L3,4 MB and L5 DR RFA. Plan:  Follow up on his low back pain with complaints of increased right sided low back pain with positive facet tenderness on exam.  Continues to benefit from Left L3,4 MB and L5 DR block. Patient had excellent relief with right L3,4,5 MB RFA done 06/2020 and is asking to repeat, will schedule. Continue with OTC pain medications. OARRS report reviewed 05/2021.              Patient encouraged to stay active.              Treatment plan discussed with the patient including procedure side effects.     We discussed with the patient that combining opioids, benzodiazepines, alcohol, illicit drugs or sleep aids increases the risk of respiratory depression including death. We discussed that these medications may cause drowsiness, sedation or dizziness and have counseled the patient not to drive or operate machinery. We have discussed that these medications will be prescribed only by one provider. We have discussed with the patient about age related risk factors and have thoroughly discussed the importance of taking these medications as prescribed. The patient verbalizes understanding. ccrefmaylining yokasta Harding M.D.

## 2021-05-27 ENCOUNTER — HOSPITAL ENCOUNTER (EMERGENCY)
Age: 41
Discharge: HOME | End: 2021-05-27
Payer: MEDICAID

## 2021-05-27 ENCOUNTER — APPOINTMENT (OUTPATIENT)
Dept: CT IMAGING | Age: 41
End: 2021-05-27
Payer: MEDICAID

## 2021-05-27 VITALS
BODY MASS INDEX: 25.18 KG/M2 | TEMPERATURE: 98.4 F | HEART RATE: 75 BPM | DIASTOLIC BLOOD PRESSURE: 94 MMHG | SYSTOLIC BLOOD PRESSURE: 149 MMHG | WEIGHT: 170 LBS | OXYGEN SATURATION: 99 % | RESPIRATION RATE: 16 BRPM | HEIGHT: 69 IN

## 2021-05-27 DIAGNOSIS — N20.1 URETEROLITHIASIS: Primary | ICD-10-CM

## 2021-05-27 LAB
ALBUMIN SERPL-MCNC: 4.2 G/DL (ref 3.5–5.2)
ALP BLD-CCNC: 82 U/L (ref 40–129)
ALT SERPL-CCNC: 24 U/L (ref 0–40)
ANION GAP SERPL CALCULATED.3IONS-SCNC: 8 MMOL/L (ref 7–16)
AST SERPL-CCNC: 24 U/L (ref 0–39)
BASOPHILS ABSOLUTE: 0.07 E9/L (ref 0–0.2)
BASOPHILS RELATIVE PERCENT: 0.9 % (ref 0–2)
BILIRUB SERPL-MCNC: 0.7 MG/DL (ref 0–1.2)
BILIRUBIN URINE: NEGATIVE
BLOOD, URINE: NEGATIVE
BUN BLDV-MCNC: 7 MG/DL (ref 6–20)
CALCIUM SERPL-MCNC: 9 MG/DL (ref 8.6–10.2)
CHLORIDE BLD-SCNC: 102 MMOL/L (ref 98–107)
CLARITY: CLEAR
CO2: 29 MMOL/L (ref 22–29)
COLOR: YELLOW
CREAT SERPL-MCNC: 1.1 MG/DL (ref 0.7–1.2)
EOSINOPHILS ABSOLUTE: 0.13 E9/L (ref 0.05–0.5)
EOSINOPHILS RELATIVE PERCENT: 1.8 % (ref 0–6)
GFR AFRICAN AMERICAN: >60
GFR NON-AFRICAN AMERICAN: >60 ML/MIN/1.73
GLUCOSE BLD-MCNC: 116 MG/DL (ref 74–99)
GLUCOSE URINE: NEGATIVE MG/DL
HCT VFR BLD CALC: 44.7 % (ref 37–54)
HEMOGLOBIN: 15.9 G/DL (ref 12.5–16.5)
IMMATURE GRANULOCYTES #: 0.01 E9/L
IMMATURE GRANULOCYTES %: 0.1 % (ref 0–5)
KETONES, URINE: NEGATIVE MG/DL
LACTIC ACID: 0.9 MMOL/L (ref 0.5–2.2)
LEUKOCYTE ESTERASE, URINE: NEGATIVE
LYMPHOCYTES ABSOLUTE: 1.6 E9/L (ref 1.5–4)
LYMPHOCYTES RELATIVE PERCENT: 21.6 % (ref 20–42)
MCH RBC QN AUTO: 32.1 PG (ref 26–35)
MCHC RBC AUTO-ENTMCNC: 35.6 % (ref 32–34.5)
MCV RBC AUTO: 90.3 FL (ref 80–99.9)
MONOCYTES ABSOLUTE: 0.63 E9/L (ref 0.1–0.95)
MONOCYTES RELATIVE PERCENT: 8.5 % (ref 2–12)
NEUTROPHILS ABSOLUTE: 4.98 E9/L (ref 1.8–7.3)
NEUTROPHILS RELATIVE PERCENT: 67.1 % (ref 43–80)
NITRITE, URINE: NEGATIVE
PDW BLD-RTO: 13.3 FL (ref 11.5–15)
PH UA: 7 (ref 5–9)
PLATELET # BLD: 166 E9/L (ref 130–450)
PMV BLD AUTO: 11.4 FL (ref 7–12)
POTASSIUM SERPL-SCNC: 4.2 MMOL/L (ref 3.5–5)
PROTEIN UA: NEGATIVE MG/DL
RBC # BLD: 4.95 E12/L (ref 3.8–5.8)
SODIUM BLD-SCNC: 139 MMOL/L (ref 132–146)
SPECIFIC GRAVITY UA: <=1.005 (ref 1–1.03)
TOTAL PROTEIN: 6.7 G/DL (ref 6.4–8.3)
UROBILINOGEN, URINE: 0.2 E.U./DL
WBC # BLD: 7.4 E9/L (ref 4.5–11.5)

## 2021-05-27 PROCEDURE — 83605 ASSAY OF LACTIC ACID: CPT

## 2021-05-27 PROCEDURE — 96374 THER/PROPH/DIAG INJ IV PUSH: CPT

## 2021-05-27 PROCEDURE — 80053 COMPREHEN METABOLIC PANEL: CPT

## 2021-05-27 PROCEDURE — 6360000002 HC RX W HCPCS: Performed by: PHYSICIAN ASSISTANT

## 2021-05-27 PROCEDURE — 96375 TX/PRO/DX INJ NEW DRUG ADDON: CPT

## 2021-05-27 PROCEDURE — 74177 CT ABD & PELVIS W/CONTRAST: CPT

## 2021-05-27 PROCEDURE — 2580000003 HC RX 258: Performed by: PHYSICIAN ASSISTANT

## 2021-05-27 PROCEDURE — 85025 COMPLETE CBC W/AUTO DIFF WBC: CPT

## 2021-05-27 PROCEDURE — 81003 URINALYSIS AUTO W/O SCOPE: CPT

## 2021-05-27 PROCEDURE — 6360000004 HC RX CONTRAST MEDICATION: Performed by: RADIOLOGY

## 2021-05-27 PROCEDURE — 99284 EMERGENCY DEPT VISIT MOD MDM: CPT

## 2021-05-27 RX ORDER — ONDANSETRON 8 MG/1
8 TABLET, ORALLY DISINTEGRATING ORAL EVERY 8 HOURS PRN
Qty: 12 TABLET | Refills: 0 | Status: SHIPPED | OUTPATIENT
Start: 2021-05-27 | End: 2021-06-08

## 2021-05-27 RX ORDER — KETOROLAC TROMETHAMINE 10 MG/1
10 TABLET, FILM COATED ORAL EVERY 6 HOURS PRN
Qty: 20 TABLET | Refills: 0 | Status: SHIPPED | OUTPATIENT
Start: 2021-05-27 | End: 2021-06-08

## 2021-05-27 RX ORDER — ONDANSETRON 2 MG/ML
4 INJECTION INTRAMUSCULAR; INTRAVENOUS ONCE
Status: COMPLETED | OUTPATIENT
Start: 2021-05-27 | End: 2021-05-27

## 2021-05-27 RX ORDER — 0.9 % SODIUM CHLORIDE 0.9 %
1000 INTRAVENOUS SOLUTION INTRAVENOUS ONCE
Status: COMPLETED | OUTPATIENT
Start: 2021-05-27 | End: 2021-05-27

## 2021-05-27 RX ORDER — MORPHINE SULFATE 4 MG/ML
4 INJECTION, SOLUTION INTRAMUSCULAR; INTRAVENOUS ONCE
Status: COMPLETED | OUTPATIENT
Start: 2021-05-27 | End: 2021-05-27

## 2021-05-27 RX ADMIN — IOPAMIDOL 75 ML: 755 INJECTION, SOLUTION INTRAVENOUS at 03:31

## 2021-05-27 RX ADMIN — SODIUM CHLORIDE 1000 ML: 9 INJECTION, SOLUTION INTRAVENOUS at 01:56

## 2021-05-27 RX ADMIN — ONDANSETRON 4 MG: 2 INJECTION INTRAMUSCULAR; INTRAVENOUS at 01:56

## 2021-05-27 RX ADMIN — MORPHINE SULFATE 4 MG: 4 INJECTION, SOLUTION INTRAMUSCULAR; INTRAVENOUS at 01:56

## 2021-05-27 ASSESSMENT — PAIN DESCRIPTION - ORIENTATION: ORIENTATION: MID

## 2021-05-27 ASSESSMENT — PAIN SCALES - GENERAL: PAINLEVEL_OUTOF10: 10

## 2021-05-27 ASSESSMENT — PAIN DESCRIPTION - ONSET: ONSET: ON-GOING

## 2021-05-27 ASSESSMENT — PAIN DESCRIPTION - LOCATION: LOCATION: ABDOMEN

## 2021-05-27 ASSESSMENT — PAIN DESCRIPTION - PAIN TYPE: TYPE: ACUTE PAIN

## 2021-05-27 NOTE — ED PROVIDER NOTES
Independent MLP  HPI:  5/27/21, Time: 1:40 AM EDT         Emily Landa is a 41 y.o. male presenting to the ED for lower abdominal pain beginning 1700. The complaint has been persistent, severe in severity, and worsened by nothing. Patient comes in with complaint of lower abdominal pain that started around 5 PM.  He states the pain is a constant sharp pain. There is no pain radiation to the back. States he did initially have pain radiating towards his left groin which has resolved. Denies any nausea vomiting states he has had diarrhea. No dark tarry or bloody stools present. He denies any urinary frequency urgency burning. Patient has history of kidney stones states this feels different. Review of Systems:   A complete review of systems was performed and pertinent positives and negatives are stated within HPI, all other systems reviewed and are negative.          --------------------------------------------- PAST HISTORY ---------------------------------------------  Past Medical History:  has a past medical history of Chronic back pain, Kidney stone, Low back pain, and Neck pain. Past Surgical History:  has a past surgical history that includes Dental surgery; Nerve Block (Right, 01/13/2020); Anesthesia Nerve Block (Right, 1/13/2020); Nerve Block (Right, 02/20/2020); Anesthesia Nerve Block (Right, 2/20/2020); Pain management procedure (Right, 6/11/2020); Nerve Block (Right, 09/03/2020); Nerve Block (Right, 9/3/2020); Nerve Block (Left, 03/08/2021); and Nerve Block (Left, 3/8/2021). Social History:  reports that he quit smoking about 8 years ago. His smoking use included cigarettes. He has a 11.50 pack-year smoking history. He has never used smokeless tobacco. He reports that he does not drink alcohol and does not use drugs. Family History: family history includes Diabetes in his mother; Heart Disease in his mother. The patients home medications have been reviewed.     Allergies: Patient has no known allergies.     -------------------------------------------------- RESULTS -------------------------------------------------  All laboratory and radiology results have been personally reviewed by myself   LABS:  Results for orders placed or performed during the hospital encounter of 05/27/21   CBC Auto Differential   Result Value Ref Range    WBC 7.4 4.5 - 11.5 E9/L    RBC 4.95 3.80 - 5.80 E12/L    Hemoglobin 15.9 12.5 - 16.5 g/dL    Hematocrit 44.7 37.0 - 54.0 %    MCV 90.3 80.0 - 99.9 fL    MCH 32.1 26.0 - 35.0 pg    MCHC 35.6 (H) 32.0 - 34.5 %    RDW 13.3 11.5 - 15.0 fL    Platelets 065 797 - 425 E9/L    MPV 11.4 7.0 - 12.0 fL    Neutrophils % 67.1 43.0 - 80.0 %    Immature Granulocytes % 0.1 0.0 - 5.0 %    Lymphocytes % 21.6 20.0 - 42.0 %    Monocytes % 8.5 2.0 - 12.0 %    Eosinophils % 1.8 0.0 - 6.0 %    Basophils % 0.9 0.0 - 2.0 %    Neutrophils Absolute 4.98 1.80 - 7.30 E9/L    Immature Granulocytes # 0.01 E9/L    Lymphocytes Absolute 1.60 1.50 - 4.00 E9/L    Monocytes Absolute 0.63 0.10 - 0.95 E9/L    Eosinophils Absolute 0.13 0.05 - 0.50 E9/L    Basophils Absolute 0.07 0.00 - 0.20 E9/L   Comprehensive Metabolic Panel   Result Value Ref Range    Sodium 139 132 - 146 mmol/L    Potassium 4.2 3.5 - 5.0 mmol/L    Chloride 102 98 - 107 mmol/L    CO2 29 22 - 29 mmol/L    Anion Gap 8 7 - 16 mmol/L    Glucose 116 (H) 74 - 99 mg/dL    BUN 7 6 - 20 mg/dL    CREATININE 1.1 0.7 - 1.2 mg/dL    GFR Non-African American >60 >=60 mL/min/1.73    GFR African American >60     Calcium 9.0 8.6 - 10.2 mg/dL    Total Protein 6.7 6.4 - 8.3 g/dL    Albumin 4.2 3.5 - 5.2 g/dL    Total Bilirubin 0.7 0.0 - 1.2 mg/dL    Alkaline Phosphatase 82 40 - 129 U/L    ALT 24 0 - 40 U/L    AST 24 0 - 39 U/L   Lactic Acid, Plasma   Result Value Ref Range    Lactic Acid 0.9 0.5 - 2.2 mmol/L   Urinalysis   Result Value Ref Range    Color, UA Yellow Straw/Yellow    Clarity, UA Clear Clear    Glucose, Ur Negative Negative mg/dL    Bilirubin Urine Negative Negative    Ketones, Urine Negative Negative mg/dL    Specific Gravity, UA <=1.005 1.005 - 1.030    Blood, Urine Negative Negative    pH, UA 7.0 5.0 - 9.0    Protein, UA Negative Negative mg/dL    Urobilinogen, Urine 0.2 <2.0 E.U./dL    Nitrite, Urine Negative Negative    Leukocyte Esterase, Urine Negative Negative       RADIOLOGY:  Interpreted by Radiologist.  CT ABDOMEN PELVIS W IV CONTRAST Additional Contrast? None   Final Result   A 4-5 mm mid left ureteral stone results in mild hydronephrosis. Bilateral nonobstructive nephrolithiasis.             ------------------------- NURSING NOTES AND VITALS REVIEWED ---------------------------   The nursing notes within the ED encounter and vital signs as below have been reviewed. BP (!) 149/94   Pulse 75   Temp 98.4 °F (36.9 °C) (Oral)   Resp 16   Ht 5' 9\" (1.753 m)   Wt 170 lb (77.1 kg)   SpO2 99%   BMI 25.10 kg/m²   Oxygen Saturation Interpretation: Normal      ---------------------------------------------------PHYSICAL EXAM--------------------------------------      Constitutional/General: Alert and oriented x3, well appearing, non toxic in NAD  Head: Normocephalic and atraumatic  Eyes: PERRL, EOMI  Mouth: Oropharynx clear, handling secretions, no trismus  Neck: Supple, full ROM,   Pulmonary: Lungs clear to auscultation bilaterally, no wheezes, rales, or rhonchi. Not in respiratory distress  Cardiovascular:  Regular rate and rhythm, no murmurs, gallops, or rubs. 2+ distal pulses  Abdomen: Soft, left lower quadrant, non distended, no guarding or rebound no CVA tenderness  Extremities: Moves all extremities x 4.  Warm and well perfused  Skin: warm and dry without rash  Neurologic: GCS 15,  Psych: Normal Affect      ------------------------------ ED COURSE/MEDICAL DECISION MAKING----------------------  Medications   0.9 % sodium chloride bolus (0 mLs Intravenous Stopped 5/27/21 0248)   morphine sulfate (PF) injection 4 mg (4 mg Intravenous Given 5/27/21 0156)   ondansetron (ZOFRAN) injection 4 mg (4 mg Intravenous Given 5/27/21 0156)   iopamidol (ISOVUE-370) 76 % injection 75 mL (75 mLs Intravenous Given 5/27/21 0331)         ED COURSE:   0250 attempted to reassess patient's pain at this time, patient not in room  0310 patient states his pain nausea has improved. He is awaiting CT at this time  0400 care transferred to Dr. Mack Alatorre pending     medical Decision Making:    Patient came in with complaint of sudden onset of left lower quadrant pain CT showing  Kidney stone     Counseling: The emergency provider has spoken with the patient and discussed todays results, in addition to providing specific details for the plan of care and counseling regarding the diagnosis and prognosis. Questions are answered at this time and they are agreeable with the plan.      --------------------------------- IMPRESSION AND DISPOSITION ---------------------------------    IMPRESSION  1. Ureterolithiasis        DISPOSITION  Disposition: Discharge to home  Patient condition is good      NOTE: This report was transcribed using voice recognition software.  Every effort was made to ensure accuracy; however, inadvertent computerized transcription errors may be present     Aniyah Costello, [id number]  05/27/21 2534

## 2021-05-27 NOTE — ED NOTES
Discharge instructions given and patient verbalized understanding and amb out of the er without difficulty     Bess Tejeda RN  84/11/27 0768

## 2021-06-04 ENCOUNTER — OFFICE VISIT (OUTPATIENT)
Dept: FAMILY MEDICINE CLINIC | Age: 41
End: 2021-06-04
Payer: MEDICAID

## 2021-06-04 VITALS
SYSTOLIC BLOOD PRESSURE: 102 MMHG | TEMPERATURE: 98 F | DIASTOLIC BLOOD PRESSURE: 66 MMHG | HEART RATE: 62 BPM | WEIGHT: 176.6 LBS | HEIGHT: 69 IN | BODY MASS INDEX: 26.16 KG/M2

## 2021-06-04 DIAGNOSIS — R00.2 PALPITATIONS: ICD-10-CM

## 2021-06-04 DIAGNOSIS — R94.31 EKG ABNORMALITIES: ICD-10-CM

## 2021-06-04 DIAGNOSIS — N20.0 KIDNEY STONE: Primary | ICD-10-CM

## 2021-06-04 PROCEDURE — 1036F TOBACCO NON-USER: CPT | Performed by: STUDENT IN AN ORGANIZED HEALTH CARE EDUCATION/TRAINING PROGRAM

## 2021-06-04 PROCEDURE — G8419 CALC BMI OUT NRM PARAM NOF/U: HCPCS | Performed by: STUDENT IN AN ORGANIZED HEALTH CARE EDUCATION/TRAINING PROGRAM

## 2021-06-04 PROCEDURE — G8427 DOCREV CUR MEDS BY ELIG CLIN: HCPCS | Performed by: STUDENT IN AN ORGANIZED HEALTH CARE EDUCATION/TRAINING PROGRAM

## 2021-06-04 PROCEDURE — 93000 ELECTROCARDIOGRAM COMPLETE: CPT | Performed by: STUDENT IN AN ORGANIZED HEALTH CARE EDUCATION/TRAINING PROGRAM

## 2021-06-04 PROCEDURE — 99214 OFFICE O/P EST MOD 30 MIN: CPT | Performed by: STUDENT IN AN ORGANIZED HEALTH CARE EDUCATION/TRAINING PROGRAM

## 2021-06-04 RX ORDER — TAMSULOSIN HCL 0.4 MG
0.4 CAPSULE ORAL DAILY
Qty: 30 CAPSULE | Refills: 3
Start: 2021-06-04 | End: 2021-11-17

## 2021-06-04 ASSESSMENT — PATIENT HEALTH QUESTIONNAIRE - PHQ9
1. LITTLE INTEREST OR PLEASURE IN DOING THINGS: 0
SUM OF ALL RESPONSES TO PHQ QUESTIONS 1-9: 0
SUM OF ALL RESPONSES TO PHQ QUESTIONS 1-9: 0
SUM OF ALL RESPONSES TO PHQ9 QUESTIONS 1 & 2: 0
SUM OF ALL RESPONSES TO PHQ QUESTIONS 1-9: 0
2. FEELING DOWN, DEPRESSED OR HOPELESS: 0

## 2021-06-04 NOTE — PROGRESS NOTES
[facility]  Precepting Note    Subjective:  ER f/u  L ureteral stone  No blood in urine  Pain is improving  Sent home on Toradol  Has palpitations, lightheadedness and dizziness    ROS otherwise negative     Past medical, surgical, family and social history were reviewed, non-contributory, and unchanged unless otherwise stated. Objective:    /66   Pulse 62   Temp 98 °F (36.7 °C) (Temporal)   Ht 5' 9\" (1.753 m)   Wt 176 lb 9.6 oz (80.1 kg)   BMI 26.08 kg/m²     Exam is as noted by resident with the following changes, additions or corrections:    General:  NAD; alert & oriented x 3   Heart:  RRR, no murmurs, gallops, or rubs. Lungs:  CTA bilaterally, no wheeze, rales or rhonchi  Abd: bowel sounds present, nontender, nondistended, no masses  Extrem:  No clubbing, cyanosis, or edema    Assessment/Plan:  Nephrolithiasis: Flomax  Palpitations, skipped beats: EKG,Holter monitor  Echocardiogram     Attending Physician Statement  I have reviewed the chart, including any radiology or labs. I have discussed the case, including pertinent history and exam findings with the resident. I agree with the assessment, plan and orders as documented by the resident. Please refer to the resident note for additional information.       Electronically signed by Melissa Calderon MD on 6/4/2021 at 9:35 AM

## 2021-06-04 NOTE — PATIENT INSTRUCTIONS
Please follow up with me if you would like as I am graduating from [employer] and moving to the [facility] at [address] [place] [address], [facility]. You can call to schedule an appointment, if you would like, the number is [phone].

## 2021-06-04 NOTE — PROGRESS NOTES
[facility]  Department of Family Medicine  Family Medicine Residency Program      Patient:  Harvey Paredes 41 y.o. male     Date of Service: 6/4/21      Chief complaint:   Chief Complaint   Patient presents with    ED Follow-up     kidney stones          History ofPresent Illness   The patient is a 41 y.o. male  presented to the clinic with complaints as above.     Kidney stones  -ED f/u  -currently, pain has improved, is now just mild  -is taking toradol, does help somewhat  -no dysuria, hematuria, or issues with urination  -drinking a lot of liquids     Skipped beats, palpitations  -chronic issue  -had covid shot a month ago and felt a lot of these skipped beats  -feels as if his heart is flip flopping and beating fast  -does get lightheaded and dizzy with these episodes, lasts for a minute or so  -last episode was a month ago    Past Medical History:      Diagnosis Date    Chronic back pain     Kidney stone     Low back pain     Neck pain        PastSurgical History:        Procedure Laterality Date    ANESTHESIA NERVE BLOCK Right 1/13/2020    RIGHT L3,4 MEDIAL BRANCH AND DORSAL RAMUS BLOCK WITHOUT SEDATION (CPT 23071,68429) performed by Krystal Adler MD at [address] [place] [address] Right 2/20/2020    RIGHT L3,4 MEDIAL BRANCH AND L5 DORSAL RAMUS BLOCK (CPT 78091,11164) performed by Krystal Adler MD at [place] [address] Right 01/13/2020    mbb and dorsal rami    NERVE BLOCK Right 02/20/2020    medial branch block    NERVE BLOCK Right 09/03/2020    NERVE BLOCK Right 9/3/2020    RIGHT C3,4,5 MEDIAL BRANCH BLOCK (CPT 29000,09320) performed by Krystal Adler MD at [facility] Left 03/08/2021    Left L3, 4 Medial branch and L5 dorsal ramus block    NERVE BLOCK Left 3/8/2021    LEFT L3,4 MEDIAL BRANCH AND L5 DORSAL RAMUS BLOCK (CPT [id number]) performed by Krystal Adler MD at [address] [place] [address] PROCEDURE Right 2020    RIGHT L3, 4 MEDIAL BRANCH AND L5 DORSAL RAMUS RADIOFREQUENCY ABLATION [facility]:    Patient has no known allergies. Social History:   Social History     Socioeconomic History    Marital status:      Spouse name: Not on file    Number of children: Not on file    Years of education: Not on file    Highest education level: Not on file   Occupational History    Not on file   Tobacco Use    Smoking status: Former Smoker     Packs/day: 0.50     Years: 23.00     Pack years: 11.50     Types: Cigarettes     Quit date:      Years since quittin.4    Smokeless tobacco: Never Used   Vaping Use    Vaping Use: Never used   Substance and Sexual Activity    Alcohol use: No    Drug use: No    Sexual activity: Yes     Partners: Female   Other Topics Concern    Not on file   Social History Narrative    Not on file     Social Determinants of Health     Financial Resource Strain:     Difficulty of Paying Living Expenses:    Food Insecurity:     Worried About Running Out of Food in the Last Year:     [address] in the Last Year:    Transportation Needs:     Lack of Transportation (Medical):      Lack of Transportation (Non-Medical):    Physical Activity:     Days of Exercise per Week:     Minutes of Exercise per Session:    Stress:     Feeling of Stress :    Social Connections:     Frequency of Communication with Friends and Family:     Frequency of Social Gatherings with Friends and Family:     Attends Congregation Services:     Active Member of Clubs or Organizations:     Attends Club or Organization Meetings:     Marital Status:    Intimate Partner Violence:     Fear of Current or Ex-Partner:     Emotionally Abused:     Physically Abused:     Sexually Abused:         Family History:       Problem Relation Age of Onset    Heart Disease Mother     Diabetes Mother        Review of Systems:   Review of Systems - as above     Physical Exam   Vitals: /66   Pulse 62   Temp 98 °F (36.7 °C) (Temporal)   Ht 5' 9\" (1.753 m)   Wt 176 lb 9.6 oz (80.1 kg)   BMI 26.08 kg/m²   Physical Exam  Constitutional:       Appearance: He is well-developed. HENT:      Head: Normocephalic and atraumatic. Eyes:      General:         Right eye: No discharge. Left eye: No discharge. Conjunctiva/sclera: Conjunctivae normal.   Neck:      Trachea: No tracheal deviation. Cardiovascular:      Rate and Rhythm: Normal rate and regular rhythm. Heart sounds: Normal heart sounds. Pulmonary:      Effort: Pulmonary effort is normal. No respiratory distress. Breath sounds: Normal breath sounds. No wheezing. Abdominal:      General: Bowel sounds are normal. There is no distension. Palpations: Abdomen is soft. Tenderness: There is no abdominal tenderness. Musculoskeletal:         General: No tenderness. Cervical back: Normal range of motion and neck supple. Skin:     General: Skin is warm and dry. Neurological:      General: No focal deficit present. Mental Status: He is alert. Psychiatric:         Behavior: Behavior normal.         Assessment and Plan       1. Kidney stone  F/u  -Pain improving, unclear if he has passed the stone yet, therefore will prescribe flomax to help with this and see back in 2 months, may need repeat urinalysis at that time depending on if he is having symptoms or not  - FLOMAX 0.4 MG capsule; Take 1 capsule by mouth daily  Dispense: 30 capsule; Refill: 3    2. Palpitations  Chronic issue  -Sounds like ectopic beats vs possible PAF, will get holter to further evaluate  -EKG in office showed notched P waves, possible atrial abnormality, therefore echo ordered  -Will further evaluate palpitations with mag level and TSH level.    - TSH; Future  - MAGNESIUM; Future  - Holter Monitor 48 Hour; Future  - EKG 12 lead;  Future  - EKG 12 lead  - Echocardiogram complete;

## 2021-06-11 ENCOUNTER — HOSPITAL ENCOUNTER (OUTPATIENT)
Dept: SLEEP CENTER | Age: 41
Discharge: HOME | End: 2021-06-11
Payer: MEDICAID

## 2021-06-11 DIAGNOSIS — R00.2 PALPITATIONS: ICD-10-CM

## 2021-06-11 DIAGNOSIS — I49.9 IRREGULAR HEART RATE: Primary | ICD-10-CM

## 2021-06-11 PROCEDURE — 93225 XTRNL ECG REC<48 HRS REC: CPT

## 2021-06-11 PROCEDURE — 93226 XTRNL ECG REC<48 HR SCAN A/R: CPT

## 2021-06-14 ENCOUNTER — HOSPITAL ENCOUNTER (OUTPATIENT)
Age: 41
Setting detail: OUTPATIENT SURGERY
Discharge: HOME | End: 2021-06-14
Attending: PAIN MEDICINE | Admitting: PAIN MEDICINE
Payer: MEDICAID

## 2021-06-14 ENCOUNTER — HOSPITAL ENCOUNTER (OUTPATIENT)
Dept: OPERATING ROOM | Age: 41
Setting detail: OUTPATIENT SURGERY
Discharge: HOME | End: 2021-06-14
Attending: PAIN MEDICINE
Payer: MEDICAID

## 2021-06-14 VITALS
DIASTOLIC BLOOD PRESSURE: 77 MMHG | HEART RATE: 66 BPM | BODY MASS INDEX: 26.22 KG/M2 | OXYGEN SATURATION: 98 % | RESPIRATION RATE: 16 BRPM | HEIGHT: 69 IN | WEIGHT: 177 LBS | SYSTOLIC BLOOD PRESSURE: 117 MMHG

## 2021-06-14 DIAGNOSIS — M47.896 OTHER OSTEOARTHRITIS OF SPINE, LUMBAR REGION: ICD-10-CM

## 2021-06-14 PROCEDURE — 2709999900 HC NON-CHARGEABLE SUPPLY: Performed by: PAIN MEDICINE

## 2021-06-14 PROCEDURE — 3600000015 HC SURGERY LEVEL 5 ADDTL 15MIN: Performed by: PAIN MEDICINE

## 2021-06-14 PROCEDURE — 7100000010 HC PHASE II RECOVERY - FIRST 15 MIN: Performed by: PAIN MEDICINE

## 2021-06-14 PROCEDURE — C1713 ANCHOR/SCREW BN/BN,TIS/BN: HCPCS | Performed by: PAIN MEDICINE

## 2021-06-14 PROCEDURE — 3209999900 FLUORO FOR SURGICAL PROCEDURES

## 2021-06-14 PROCEDURE — 7100000011 HC PHASE II RECOVERY - ADDTL 15 MIN: Performed by: PAIN MEDICINE

## 2021-06-14 PROCEDURE — 64636 DESTROY L/S FACET JNT ADDL: CPT | Performed by: PAIN MEDICINE

## 2021-06-14 PROCEDURE — 3600000005 HC SURGERY LEVEL 5 BASE: Performed by: PAIN MEDICINE

## 2021-06-14 PROCEDURE — 6360000002 HC RX W HCPCS: Performed by: PAIN MEDICINE

## 2021-06-14 PROCEDURE — 2500000003 HC RX 250 WO HCPCS: Performed by: PAIN MEDICINE

## 2021-06-14 PROCEDURE — 64635 DESTROY LUMB/SAC FACET JNT: CPT | Performed by: PAIN MEDICINE

## 2021-06-14 RX ORDER — LIDOCAINE HYDROCHLORIDE 20 MG/ML
INJECTION, SOLUTION EPIDURAL; INFILTRATION; INTRACAUDAL; PERINEURAL PRN
Status: DISCONTINUED | OUTPATIENT
Start: 2021-06-14 | End: 2021-06-14 | Stop reason: ALTCHOICE

## 2021-06-14 ASSESSMENT — PAIN SCALES - GENERAL
PAINLEVEL_OUTOF10: 0
PAINLEVEL_OUTOF10: 3

## 2021-06-14 ASSESSMENT — PAIN - FUNCTIONAL ASSESSMENT
PAIN_FUNCTIONAL_ASSESSMENT: PREVENTS OR INTERFERES SOME ACTIVE ACTIVITIES AND ADLS
PAIN_FUNCTIONAL_ASSESSMENT: 0-10

## 2021-06-14 ASSESSMENT — PAIN DESCRIPTION - DESCRIPTORS: DESCRIPTORS: ACHING

## 2021-06-14 NOTE — H&P
[facility]  [address] [facility], [address] [place] [address] [place]  [phone]    Procedure History & Physical      Hero Torrez     HPI:    Patient  is here for axial low back pain for Right L3,4,5 MB RFA  Labs/imaging studies reviewed   All question and concerns addressed including R/B/A associated with the procedure    Past Medical History:   Diagnosis Date    Chronic back pain     Kidney stone     Low back pain     Neck pain        Past Surgical History:   Procedure Laterality Date    ANESTHESIA NERVE BLOCK Right 1/13/2020    RIGHT L3,4 MEDIAL BRANCH AND DORSAL RAMUS BLOCK WITHOUT SEDATION (CPT 00756,94708) performed by Genoveva Bellamy MD at [address] [place] [address] Right 2/20/2020    RIGHT L3,4 MEDIAL BRANCH AND L5 DORSAL RAMUS BLOCK (CPT 44219,90680) performed by Genoveva Bellamy MD at [place] [address] Right 01/13/2020    mbb and dorsal rami    NERVE BLOCK Right 02/20/2020    medial branch block    NERVE BLOCK Right 09/03/2020    NERVE BLOCK Right 9/3/2020    RIGHT C3,4,5 MEDIAL BRANCH BLOCK (CPT 54808,24440) performed by Genoveva Bellamy MD at [facility] Left 03/08/2021    Left L3, 4 Medial branch and L5 dorsal ramus block    NERVE BLOCK Left 3/8/2021    LEFT L3,4 MEDIAL BRANCH AND L5 DORSAL RAMUS BLOCK (CPT [id number]) performed by Genoveva Bellamy MD at [address] [place] [address] Right 6/11/2020    RIGHT L3, 4 MEDIAL BRANCH AND L5 DORSAL RAMUS RADIOFREQUENCY ABLATION performed by Genoveva Bellamy MD at [address] [place] [address]       Prior to Admission medications    Medication Sig Start Date End Date Taking?  Authorizing Provider   FLOMAX 0.4 MG capsule Take 1 capsule by mouth daily 6/4/21   Laurel Sullivan,    Tens Unit MISC by Does not apply route 2/9/18   Tiffanie Enriquez, DO       No Known Allergies    Social History     Socioeconomic History    Marital status:      Spouse name: Not on file    Number of children: Not on file    Years of education: Not on file    Highest education level: Not on file   Occupational History    Not on file   Tobacco Use    Smoking status: Former Smoker     Packs/day: 0.50     Years: 23.00     Pack years: 11.50     Types: Cigarettes     Quit date:      Years since quittin.4    Smokeless tobacco: Never Used   Vaping Use    Vaping Use: Never used   Substance and Sexual Activity    Alcohol use: No    Drug use: No    Sexual activity: Yes     Partners: Female   Other Topics Concern    Not on file   Social History Narrative    Not on file     Social Determinants of Health     Financial Resource Strain:     Difficulty of Paying Living Expenses:    Food Insecurity:     Worried About Running Out of Food in the Last Year:     [address] in the Last Year:    Transportation Needs:     Lack of Transportation (Medical):      Lack of Transportation (Non-Medical):    Physical Activity:     Days of Exercise per Week:     Minutes of Exercise per Session:    Stress:     Feeling of Stress :    Social Connections:     Frequency of Communication with Friends and Family:     Frequency of Social Gatherings with Friends and Family:     Attends Shinto Services:     Active Member of Clubs or Organizations:     Attends Club or Organization Meetings:     Marital Status:    Intimate Partner Violence:     Fear of Current or Ex-Partner:     Emotionally Abused:     Physically Abused:     Sexually Abused:        Family History   Problem Relation Age of Onset    Heart Disease Mother     Diabetes Mother          REVIEW OF SYSTEMS:    CONSTITUTIONAL:  negative for  fevers, chills, sweats and fatigue    RESPIRATORY:  negative for  dry cough, cough with sputum, dyspnea, wheezing and chest pain    CARDIOVASCULAR:  negative for chest pain, dyspnea, palpitations, syncope    GASTROINTESTINAL:  negative for nausea, vomiting, change in bowel habits, diarrhea, constipation and abdominal pain    MUSCULOSKELETAL: negative for muscle weakness    SKIN: negative for itching or rashes. BEHAVIOR/PSYCH:  negative for poor appetite, increased appetite, decreased sleep and poor concentration    All other systems negative      PHYSICAL EXAM:    VITALS:  /76   Resp 16   Ht 5' 9\" (1.753 m)   Wt 177 lb (80.3 kg)   SpO2 98%   BMI 26.14 kg/m²     CONSTITUTIONAL:  awake, alert, cooperative, no apparent distress, and appears stated age    EYES: PERRLA, EOMI    LUNGS:  No increased work of breathing, no audible wheezing    CARDIOVASCULAR:  regular rate and rhythm    ABDOMEN:  Soft non tender non distended     EXTREMITIES: no signs of clubbing or cyanosis. MUSCULOSKELETAL: negative for flaccid muscle tone or spastic movements. SKIN: gross examination reveals no signs of rashes, or diaphoresis. NEURO: Cranial nerves II-XII grossly intact. No signs of agitated mood. Assessment/Plan:    Axial low back pain for Right L3,4,5 MB RFA.

## 2021-06-14 NOTE — OP NOTE
2021    Patient: Haylee Root  :  1980  Age:  41 y.o. Sex:  male     PRE-OPERATIVE DIAGNOSIS: Right Lumbar spondylosis, lumbar facet arthropathy. POST-OPERATIVE DIAGNOSIS: Same. PROCEDURE:  Fluoroscopic-guided Right  Lumbar facet medial branch radiofrequency ablation at levels L3,4,5.    SURGEON:  BRAYAN Chen M.D. ANESTHESIA: Local    ESTIMATED BLOOD LOSS: None.  ______________________________________________________________________  HISTORY & PHYSICAL: Haylee Root presents today for fluoroscopic-guided Right lumbar facet medial branch radiofrequency ablation at levels L3,4,5. The potential complications of the procedure were explained to Apurva Vaughn again today and he has elected to undergo the aforementioned procedure. Marycarmen complete History & Physical examination were reviewed in depth, a copy of which is in the chart. DESCRIPTION OF PROCEDURE:    After confirming written and informed consent, a time-out was performed and Marycarmen name and date of birth, the procedure to be performed as well as the plan for the location of the needle insertion were confirmed. The patient was brought into the procedure room and placed in the prone position on the fluoroscopy table. Standard monitors were placed and vital signs were observed throughout the procedure. The area of the lumbar spine and upper buttocks was sterilely prepped with chloraprep and draped in a sterile manner. AP fluoroscopy was used to identify and frank bartons point at the targeted area. A 22 gauge 10 mm radiofrequency probe was advanced toward each of these points. Once bone was contacted, negative aspiration for blood and CSF was confirmed, sensory stimulation was performed at 50 Hz and at 0.4 volts generating a pressure sensation. Motor stimulation < 2.0 volts elicited multifidus twitching without any radicular symptoms.  1 ml of 2% lidocaine was injected prior to lesioning, which was performed for 90 seconds at 90 degrees centigrade. Once the lesions were complete, a solution of 0.25% marcaine 3 cc and 40 mg DepoMedrol was injected and distributed equally through each probe. The probes were removed . The patient's back was cleaned and bandages were placed over the needle insertion sites. Disposition the patient tolerated the procedure well and there were no complications . Vital signs remained stable throughout the procedure. The patient was escorted to the recovery area where they remained until discharge and written discharge instructions for the procedure were given. Plan: Guillermo Garcia will return to our pain management center as scheduled.      Ramiro Huff MD

## 2021-11-17 ENCOUNTER — OFFICE VISIT (OUTPATIENT)
Dept: PAIN MANAGEMENT | Age: 41
End: 2021-11-17
Payer: MEDICAID

## 2021-11-17 VITALS
DIASTOLIC BLOOD PRESSURE: 82 MMHG | TEMPERATURE: 97.3 F | OXYGEN SATURATION: 97 % | WEIGHT: 177 LBS | BODY MASS INDEX: 26.22 KG/M2 | HEART RATE: 78 BPM | SYSTOLIC BLOOD PRESSURE: 130 MMHG | RESPIRATION RATE: 16 BRPM | HEIGHT: 69 IN

## 2021-11-17 DIAGNOSIS — M43.16 SPONDYLOLISTHESIS AT L4-L5 LEVEL: ICD-10-CM

## 2021-11-17 DIAGNOSIS — M43.16 SPONDYLOLISTHESIS OF LUMBAR REGION: ICD-10-CM

## 2021-11-17 DIAGNOSIS — M51.9 LUMBAR DISC DISORDER: Primary | ICD-10-CM

## 2021-11-17 DIAGNOSIS — M47.816 LUMBAR SPONDYLOSIS: ICD-10-CM

## 2021-11-17 DIAGNOSIS — M47.816 LUMBAR FACET ARTHROPATHY: ICD-10-CM

## 2021-11-17 PROCEDURE — G8419 CALC BMI OUT NRM PARAM NOF/U: HCPCS | Performed by: PAIN MEDICINE

## 2021-11-17 PROCEDURE — 99213 OFFICE O/P EST LOW 20 MIN: CPT | Performed by: PAIN MEDICINE

## 2021-11-17 PROCEDURE — G8484 FLU IMMUNIZE NO ADMIN: HCPCS | Performed by: PAIN MEDICINE

## 2021-11-17 PROCEDURE — G8427 DOCREV CUR MEDS BY ELIG CLIN: HCPCS | Performed by: PAIN MEDICINE

## 2021-11-17 PROCEDURE — 1036F TOBACCO NON-USER: CPT | Performed by: PAIN MEDICINE

## 2021-11-17 NOTE — PROGRESS NOTES
[address]  [address] [facility], [address] [place] [address] [place]  [phone]    Follow up Note      Dane Vázquez     Date of Visit:  11/17/2021    CC:  Patient presents for follow up   Chief Complaint   Patient presents with    Lower Back Pain     HPI:  Office extension for increased Left side low back pain with no radicular symptoms, last seen 05/2021  Appropriate analgesia with current medications regimen: N/A    Change in quality of symptoms:no.    Medication side effects:none. Recent diagnostic testing:none  Recent interventional procedures:none     He has not been on anticoagulation medications to include none. The patient  has not been on herbal supplements.  The patient is not diabetic.     Imaging:   Cervical spine MRI 2020  Findings compatible with degenerative changes     Cervical spine Xray 2020  Findings consistent with moderate degenerative disc disease of C5-C6   and mild degenerative facet disease.      Lumbar spine MRI 10/2019   Changes within the facets are abnormal. Findings are compatible with   degenerative facet disease.           At L5-S1: There is a mild broad-based disc herniation, there is mild   canal stenosis       At L4-5: There is a mild broad-based disc herniation, there is mild   canal stenosis, slightly more prominent towards the left extending   into the left neural foramina       At L3-4: There is no significant disc herniation, there is no   significant canal stenosis.       At L2-3: There is no significant disc herniation, there is no   significant canal stenosis.       At L1-2: There is no significant disc herniation, there is no   significant canal stenosis.      Lumbar spine flexsion/extension 08/2018  L4 spondylolysis with grade 1 spondylolisthesis. Approximately 4 mm translation on flexion-extension views.      Previous treatments: Physical Therapy and medications. Jake Calderon     Potential Aberrant Drug-Related Behavior:  No     Urine Drug Screening:  None, no opioids started      OARRS report:  11/2021 consistent     Opioid agreement:  Date started:N/A  Renewal date:N/A    Past Medical History:   Diagnosis Date    Chronic back pain     Kidney stone     Low back pain     Neck pain      Past Surgical History:   Procedure Laterality Date    ANESTHESIA NERVE BLOCK Right 1/13/2020    RIGHT L3,4 MEDIAL BRANCH AND DORSAL RAMUS BLOCK WITHOUT SEDATION (CPT 05063,34785) performed by Cecy Gant MD at [address] [place] [address] Right 2/20/2020    RIGHT L3,4 MEDIAL BRANCH AND L5 DORSAL RAMUS BLOCK (CPT 80841,12620) performed by Cecy Gant MD at [place] [address] Right 01/13/2020    mbb and dorsal rami    NERVE BLOCK Right 02/20/2020    medial branch block    NERVE BLOCK Right 09/03/2020    NERVE BLOCK Right 9/3/2020    RIGHT C3,4,5 MEDIAL BRANCH BLOCK (CPT 02132,18639) performed by Cecy Gant MD at [facility] Left 03/08/2021    Left L3, 4 Medial branch and L5 dorsal ramus block    NERVE BLOCK Left 3/8/2021    LEFT L3,4 MEDIAL BRANCH AND L5 DORSAL RAMUS BLOCK (CPT 78267) performed by Cecy Gant MD at [address] Right 6/11/2020    RIGHT L3, 4 MEDIAL BRANCH AND L5 DORSAL RAMUS RADIOFREQUENCY ABLATION performed by Cecy Gant MD at [address] Right 6/14/2021    RIGHT L3, 4, 5 MEDIAL BRANCH  RADIOFREQUENCY ABLATION (CPT 12587)(WANTS LAST CASE) performed by Cecy Gant MD at [address] [place] [address]     Prior to Admission medications    Medication Sig Start Date End Date Taking?  Authorizing Provider   Tens Unit MISC by Does not apply route 2/9/18  Yes Mima Enriquez, DO     No Known Allergies    Social History     Socioeconomic History    Marital status:      Spouse name: Not on file    Number of children: Not on file    Years of education: Not on file    Highest education level: Not on file Occupational History    Not on file   Tobacco Use    Smoking status: Former Smoker     Packs/day: 0.50     Years: 23.00     Pack years: 11.50     Types: Cigarettes     Quit date:      Years since quittin.8    Smokeless tobacco: Never Used   Vaping Use    Vaping Use: Never used   Substance and Sexual Activity    Alcohol use: No    Drug use: No    Sexual activity: Yes     Partners: Female   Other Topics Concern    Not on file   Social History Narrative    Not on file     Social Determinants of Health     Financial Resource Strain:     Difficulty of Paying Living Expenses: Not on file   Food Insecurity:     Worried About Running Out of Food in the Last Year: Not on file    Mohini of Food in the Last Year: Not on file   Transportation Needs:     Lack of Transportation (Medical): Not on file    Lack of Transportation (Non-Medical):  Not on file   Physical Activity:     Days of Exercise per Week: Not on file    Minutes of Exercise per Session: Not on file   Stress:     Feeling of Stress : Not on file   Social Connections:     Frequency of Communication with Friends and Family: Not on file    Frequency of Social Gatherings with Friends and Family: Not on file    Attends [employer]ism Services: Not on file    Active Member of [address] [place] [address] [employer] or Organizations: Not on file    Attends Club or Organization Meetings: Not on file    Marital Status: Not on file   Intimate Partner Violence:     Fear of Current or Ex-Partner: Not on file    Emotionally Abused: Not on file    Physically Abused: Not on file    Sexually Abused: Not on file   Housing Stability:     Unable to Pay for Housing in the Last Year: Not on file    Number of [place] in the Last Year: Not on file    Unstable Housing in the Last Year: Not on file     Family History   Problem Relation Age of Onset    Heart Disease Mother     Diabetes Mother      REVIEW OF SYSTEMS:     Inocente Negron denies fever/chills, chest pain, shortness of breath, new bowel or bladder complaints. All other review of systems was negative. PHYSICAL EXAMINATION:      /82   Pulse 78   Temp 97.3 °F (36.3 °C) (Infrared)   Resp 16   Ht 5' 9\" (1.753 m)   Wt 177 lb (80.3 kg)   SpO2 97%   BMI 26.14 kg/m²     General:      General appearance:   pleasant and well-hydrated. , in moderate discomfort and A & O x3  Build:Normal Weight    HEENT:    Head:normocephalic and atraumatic  Sclera: icterus absent,    Lungs:    Breathing:Breathing Pattern: regular, no distress    Abdomen:    Shape:non-distended and normal  Tenderness:none     Lumbar spine:    Spine inspection:normal   CVA tenderness:No   Palpation left facet tenderness,  Range of motion:Painful extension/lateral bending on the left side    Musculoskeletal:    Trigger points in Paraveteral:absent bilaterally   SI joint tenderness:negative right, negative left  SLR:negative right, negative left, sitting                 Extremities:    Tremors:None bilaterally upper and lower  Range of motion:normal ROM of bilateral hips. Intact:Yes  Edema:Normal    Neurological:    Sensory:normal to light touch bilateral upper and lower extremities. Motor:   Right Quadriceps5/5          Left Quadriceps5/5           Right Gastrocnemius5/5    Left Gastrocnemius5/5  Right Ant Tibialis5/5  Left Ant Tibialis5/5             Reflexes:    Right Quadriceps reflex2+  Left Quadriceps reflex2+  Right Achilles reflex2+  Left Achilles reflex2+  Gait:normal    Dermatology:    Skin:no unusual rashes and no skin lesions    Impression:    Chonic low back pain with radiation to the right buttocks and numbness in the Left leg. Lumbar spine MRI 2019 mild disc bulge at L4-5 with facet arthropathy. Patient had seen neurosurgery and is not a candidate for surgery. Good outcome with Right L3,4 MB and L5 DR RFA. Plan:  Office extension for worsening left sided low back pain with no radicular symptoms. Reviewed imaging studies.   Discussed Left L3,4,5 medial branch radiofrequency ablation (patient had good outcome with Left L3,4,5 medial branch block x2) patient agrees. Continue with OTC pain medications. OARRS report reviewed 11/2021.              Patient encouraged to stay active.              Treatment plan discussed with the patient including procedure side effects. We discussed with the patient that combining opioids, benzodiazepines, alcohol, illicit drugs or sleep aids increases the risk of respiratory depression including death. We discussed that these medications may cause drowsiness, sedation or dizziness and have counseled the patient not to drive or operate machinery. We have discussed that these medications will be prescribed only by one provider. We have discussed with the patient about age related risk factors and have thoroughly discussed the importance of taking these medications as prescribed. The patient verbalizes understanding. danielle Garrison M.D.

## 2021-11-17 NOTE — PROGRESS NOTES
Do you currently have any of the following:    Fever: No  Headache:  No  Cough: No  Shortness of breath: No  Exposed to anyone with these symptoms: No                                                                                                                Ave Moran presents to the [facility] on 11/17/2021. Josh Rojas is complaining of pain in his lower back. The pain is constant. The pain is described as sharp and burning. Pain is rated on his best day at a 2, on his worst day at a 9, and on average at a 6 on the VAS scale. He is not on pain medications. Any procedures since your last visit: Yes, Lumbar facet medial branch radiofrequency ablation at levels L3,4,5.with 100 % relief. He is not on NSAIDS and  is not on anticoagulation medications . Pacemaker or defibrillator: No Physician managing device is n/a.       /82   Pulse 78   Temp 97.3 °F (36.3 °C) (Infrared)   Resp 16   Ht 5' 9\" (1.753 m)   Wt 177 lb (80.3 kg)   SpO2 97%   BMI 26.14 kg/m²      No LMP for male patient.

## 2022-01-18 ENCOUNTER — OFFICE VISIT (OUTPATIENT)
Dept: PAIN MANAGEMENT | Age: 42
End: 2022-01-18
Payer: MEDICAID

## 2022-01-18 VITALS
OXYGEN SATURATION: 96 % | BODY MASS INDEX: 26.22 KG/M2 | HEART RATE: 86 BPM | DIASTOLIC BLOOD PRESSURE: 72 MMHG | SYSTOLIC BLOOD PRESSURE: 115 MMHG | HEIGHT: 69 IN | WEIGHT: 177 LBS | TEMPERATURE: 97.1 F | RESPIRATION RATE: 16 BRPM

## 2022-01-18 DIAGNOSIS — M47.816 LUMBAR SPONDYLOSIS: ICD-10-CM

## 2022-01-18 DIAGNOSIS — G89.4 CHRONIC PAIN SYNDROME: ICD-10-CM

## 2022-01-18 DIAGNOSIS — M19.011 ARTHRITIS OF RIGHT ACROMIOCLAVICULAR JOINT: ICD-10-CM

## 2022-01-18 DIAGNOSIS — M25.511 CHRONIC RIGHT SHOULDER PAIN: ICD-10-CM

## 2022-01-18 DIAGNOSIS — G89.29 CHRONIC RIGHT SHOULDER PAIN: ICD-10-CM

## 2022-01-18 DIAGNOSIS — M47.812 CERVICAL SPONDYLOSIS: ICD-10-CM

## 2022-01-18 DIAGNOSIS — M47.812 CERVICAL FACET JOINT SYNDROME: ICD-10-CM

## 2022-01-18 DIAGNOSIS — M43.16 SPONDYLOLISTHESIS OF LUMBAR REGION: ICD-10-CM

## 2022-01-18 DIAGNOSIS — M25.562 CHRONIC PAIN OF LEFT KNEE: ICD-10-CM

## 2022-01-18 DIAGNOSIS — G89.29 CHRONIC PAIN OF LEFT KNEE: ICD-10-CM

## 2022-01-18 DIAGNOSIS — M54.12 CERVICAL RADICULOPATHY: Primary | ICD-10-CM

## 2022-01-18 DIAGNOSIS — M51.9 LUMBAR DISC DISORDER: ICD-10-CM

## 2022-01-18 DIAGNOSIS — M47.816 LUMBAR FACET ARTHROPATHY: ICD-10-CM

## 2022-01-18 DIAGNOSIS — M43.16 SPONDYLOLISTHESIS AT L4-L5 LEVEL: ICD-10-CM

## 2022-01-18 PROCEDURE — 6360000002 HC RX W HCPCS

## 2022-01-18 PROCEDURE — G8427 DOCREV CUR MEDS BY ELIG CLIN: HCPCS | Performed by: PAIN MEDICINE

## 2022-01-18 PROCEDURE — 99213 OFFICE O/P EST LOW 20 MIN: CPT | Performed by: PAIN MEDICINE

## 2022-01-18 PROCEDURE — G8419 CALC BMI OUT NRM PARAM NOF/U: HCPCS | Performed by: PAIN MEDICINE

## 2022-01-18 PROCEDURE — 1036F TOBACCO NON-USER: CPT | Performed by: PAIN MEDICINE

## 2022-01-18 PROCEDURE — 20610 DRAIN/INJ JOINT/BURSA W/O US: CPT | Performed by: PAIN MEDICINE

## 2022-01-18 PROCEDURE — G8484 FLU IMMUNIZE NO ADMIN: HCPCS | Performed by: PAIN MEDICINE

## 2022-01-18 RX ORDER — METHYLPREDNISOLONE ACETATE 80 MG/ML
80 INJECTION, SUSPENSION INTRA-ARTICULAR; INTRALESIONAL; INTRAMUSCULAR; SOFT TISSUE ONCE
Status: COMPLETED | OUTPATIENT
Start: 2022-01-18 | End: 2022-01-18

## 2022-01-18 RX ORDER — BUPIVACAINE HYDROCHLORIDE 2.5 MG/ML
4 INJECTION, SOLUTION INFILTRATION; PERINEURAL ONCE
Status: COMPLETED | OUTPATIENT
Start: 2022-01-18 | End: 2022-01-18

## 2022-01-18 RX ADMIN — METHYLPREDNISOLONE ACETATE 80 MG: 80 INJECTION, SUSPENSION INTRA-ARTICULAR; INTRALESIONAL; INTRAMUSCULAR; SOFT TISSUE at 16:28

## 2022-01-18 RX ADMIN — BUPIVACAINE HYDROCHLORIDE 25 MG: 2.5 INJECTION, SOLUTION INFILTRATION; PERINEURAL at 16:27

## 2022-01-18 NOTE — PROGRESS NOTES
Do you currently have any of the following:    Fever: No  Headache:  No  Cough: No  Shortness of breath: No  Exposed to anyone with these symptoms: No                                                                                                                Joshua Ray presents to the [facility] on 1/18/2022. Candido Paz is complaining of pain in his left knee and lower back. . The pain is intermittent. . The pain is described as aching. Pain is rated on his best day at a 2, on his worst day at a 4, and on average at a 3 on the VAS scale. He took his last dose of TENS unit . Candido Paz does not have issues with constipation. Any procedures since your last visit: No,    He is not on NSAIDS and  is not on anticoagulation medications to include none and is managed by NA. Pacemaker or defibrillator: No Physician managing device is NA. Medication Contract and Consent for Opioid Use Documents Filed      No documents found                   /72   Pulse 86   Temp 97.1 °F (36.2 °C) (Infrared)   Resp 16   Ht 5' 9\" (1.753 m)   Wt 177 lb (80.3 kg)   SpO2 96%   BMI 26.14 kg/m²      No LMP for male patient.

## 2022-01-18 NOTE — PROGRESS NOTES
[address]  [address] [facility], [address] [place] [address] [place]  [phone]    Follow up Note      Arnulfo Garcia     Date of Visit:  1/18/2022    CC:  Patient presents for follow up   Chief Complaint   Patient presents with    Lower Back Pain     left knee pain     HPI:  Follow up on his Left side low back pain with complaints of increased Left knee pain. Appropriate analgesia with current medications regimen: N/A    Change in quality of symptoms:no.    Medication side effects:none. Recent diagnostic testing:none  Recent interventional procedures:none     He has not been on anticoagulation medications to include none. The patient  has not been on herbal supplements.  The patient is not diabetic.     Imaging:   Cervical spine MRI 2020  Findings compatible with degenerative changes     Cervical spine Xray 2020  Findings consistent with moderate degenerative disc disease of C5-C6   and mild degenerative facet disease.      Lumbar spine MRI 10/2019   Changes within the facets are abnormal. Findings are compatible with   degenerative facet disease.           At L5-S1: There is a mild broad-based disc herniation, there is mild   canal stenosis       At L4-5: There is a mild broad-based disc herniation, there is mild   canal stenosis, slightly more prominent towards the left extending   into the left neural foramina       At L3-4: There is no significant disc herniation, there is no   significant canal stenosis.       At L2-3: There is no significant disc herniation, there is no   significant canal stenosis.       At L1-2: There is no significant disc herniation, there is no   significant canal stenosis.      Lumbar spine flexsion/extension 08/2018  L4 spondylolysis with grade 1 spondylolisthesis. Approximately 4 mm translation on flexion-extension views.      Previous treatments: Physical Therapy and medications. Delta Serrano     Potential Aberrant Drug-Related Behavior:  No     Urine Drug bowel or bladder complaints. All other review of systems was negative. PHYSICAL EXAMINATION:      /72   Pulse 86   Temp 97.1 °F (36.2 °C) (Infrared)   Resp 16   Ht 5' 9\" (1.753 m)   Wt 177 lb (80.3 kg)   SpO2 96%   BMI 26.14 kg/m²     General:      General appearance:   pleasant and well-hydrated. , in moderate discomfort and A & O x3  Build:Normal Weight    HEENT:    Head:normocephalic and atraumatic  Sclera: icterus absent,    Lungs:    Breathing:Breathing Pattern: regular, no distress    Abdomen:    Shape:non-distended and normal  Tenderness:none     Lumbar spine:    Spine inspection:normal   CVA tenderness:No   Palpation left facet tenderness,  Range of motion:Painful extension/lateral bending on the left side    Musculoskeletal:    Trigger points in Paraveteral:absent bilaterally   SI joint tenderness:negative right, negative left  SLR:negative right, negative left, sitting                 Extremities:    Tremors:None bilaterally upper and lower  Range of motion:normal ROM of bilateral hips. Intact:Yes  Edema:Normal    Knee: Inspection:symmetric, swelling none bilaterally  Tenderness of Bony Landmarks:Medial, left  Drawer Test:negative  Effusion:absent bilaterally  Crepitus:absent bilaterally  ROM:Left limited by pain     Neurological:    Sensory:normal to light touch bilateral upper and lower extremities. Motor:   Right Quadriceps5/5          Left Quadriceps5/5           Right Gastrocnemius5/5    Left Gastrocnemius5/5  Right Ant Tibialis5/5  Left Ant Tibialis5/5             [address]    Dermatology:    Skin:no unusual rashes and no skin lesions    Impression:    Chonic low back pain with radiation to the right buttocks and numbness in the Left leg. Lumbar spine MRI 2019 mild disc bulge at L4-5 with facet arthropathy. Patient had seen neurosurgery and is not a candidate for surgery. Good outcome with Right L3,4 MB and L5 DR RFA.   Plan:  Follow up on his low back pain with complaints of increased Left knee pain. Patient is awaiting to schedule Left L3,4,5 MB RFA. Discussed Left knee injection and Left knee xray. Continue with OTC pain medications. OARRS report reviewed 2022.              Patient encouraged to stay active.              Treatment plan discussed with the patient including procedure side effects. We discussed with the patient that combining opioids, benzodiazepines, alcohol, illicit drugs or sleep aids increases the risk of respiratory depression including death. We discussed that these medications may cause drowsiness, sedation or dizziness and have counseled the patient not to drive or operate machinery. We have discussed that these medications will be prescribed only by one provider. We have discussed with the patient about age related risk factors and have thoroughly discussed the importance of taking these medications as prescribed. The patient verbalizes understanding. ccreferring physic    BRAYAN Marcelo M.D.    2022    Patient: Mone Parra  :  1980  Age:  35 y.o. Sex:  male     PRE-OPERATIVE DIAGNOSIS: Left  Knee pain, osteoarthitis. POST-OPERATIVE DIAGNOSIS: Same. PROCEDURE PERFORMED:  Left knee injection. SURGEON:   BRAYAN Marcelo M.D. ANESTHESIA: Local    ESTIMATED BLOOD LOSS: None. BRIEF HISTORY: Mone Parra comes in today for Left  knee injection. . After discussing the potential risks and benefits of the procedure with the patient. Keron Reeves did request that we proceed. A complete History & Physical was reviewed and it is unchanged. DESCRIPTION OF PROCEDURE:   The patient was placed in a seated position. The area of the Left knee was prepped with chloraprep and draped in a sterile manner. The overlying skin and subcutaneous tissues were anesthetized with 0.5% Lidocaine. Then the targeted area of the patellar tendon was palpated and marked. A 25 gauge 1 1/2 inch needle was advanced into the joint capsule.  Confirmation of needle placement was obtained by direct visualization of synovial fluid by aspiration. A solution of 0.25 % marcaine 5 cc and 80 mg DepoMedrol was injected easily without complications. The needle was then removed and Band-Aid applied. Disposition the patient tolerated the procedure well and there were no complications . Dena Stewart will follow up in our [facility] as scheduled. He was encouraged to call with questions, concerns or if worsening of symptoms occurs.     Wei Spears MD

## 2022-01-19 PROBLEM — G89.29 CHRONIC PAIN OF LEFT KNEE: Status: ACTIVE | Noted: 2022-01-19

## 2022-01-19 PROBLEM — M25.562 CHRONIC PAIN OF LEFT KNEE: Status: ACTIVE | Noted: 2022-01-19

## 2022-03-15 ENCOUNTER — OFFICE VISIT (OUTPATIENT)
Dept: PAIN MANAGEMENT | Age: 42
End: 2022-03-15
Payer: MEDICAID

## 2022-03-15 VITALS
TEMPERATURE: 97.1 F | WEIGHT: 172 LBS | DIASTOLIC BLOOD PRESSURE: 68 MMHG | SYSTOLIC BLOOD PRESSURE: 110 MMHG | BODY MASS INDEX: 25.48 KG/M2 | HEART RATE: 58 BPM | OXYGEN SATURATION: 9 % | HEIGHT: 69 IN | RESPIRATION RATE: 16 BRPM

## 2022-03-15 DIAGNOSIS — G89.29 CHRONIC RIGHT SHOULDER PAIN: ICD-10-CM

## 2022-03-15 DIAGNOSIS — M43.16 SPONDYLOLISTHESIS OF LUMBAR REGION: Primary | ICD-10-CM

## 2022-03-15 DIAGNOSIS — M47.816 LUMBAR FACET ARTHROPATHY: ICD-10-CM

## 2022-03-15 DIAGNOSIS — M51.9 LUMBAR DISC DISORDER: ICD-10-CM

## 2022-03-15 DIAGNOSIS — M47.812 CERVICAL SPONDYLOSIS: ICD-10-CM

## 2022-03-15 DIAGNOSIS — M47.812 CERVICAL FACET JOINT SYNDROME: ICD-10-CM

## 2022-03-15 DIAGNOSIS — M25.511 CHRONIC RIGHT SHOULDER PAIN: ICD-10-CM

## 2022-03-15 DIAGNOSIS — M19.011 ARTHRITIS OF RIGHT ACROMIOCLAVICULAR JOINT: ICD-10-CM

## 2022-03-15 DIAGNOSIS — M47.816 LUMBAR SPONDYLOSIS: ICD-10-CM

## 2022-03-15 DIAGNOSIS — G89.29 CHRONIC PAIN OF LEFT KNEE: ICD-10-CM

## 2022-03-15 DIAGNOSIS — M25.562 CHRONIC PAIN OF LEFT KNEE: ICD-10-CM

## 2022-03-15 DIAGNOSIS — G89.4 CHRONIC PAIN SYNDROME: ICD-10-CM

## 2022-03-15 DIAGNOSIS — M43.16 SPONDYLOLISTHESIS AT L4-L5 LEVEL: ICD-10-CM

## 2022-03-15 DIAGNOSIS — M54.12 CERVICAL RADICULOPATHY: ICD-10-CM

## 2022-03-15 PROCEDURE — 99213 OFFICE O/P EST LOW 20 MIN: CPT | Performed by: PAIN MEDICINE

## 2022-03-15 PROCEDURE — G8427 DOCREV CUR MEDS BY ELIG CLIN: HCPCS | Performed by: PAIN MEDICINE

## 2022-03-15 PROCEDURE — G8484 FLU IMMUNIZE NO ADMIN: HCPCS | Performed by: PAIN MEDICINE

## 2022-03-15 PROCEDURE — G8419 CALC BMI OUT NRM PARAM NOF/U: HCPCS | Performed by: PAIN MEDICINE

## 2022-03-15 PROCEDURE — 1036F TOBACCO NON-USER: CPT | Performed by: PAIN MEDICINE

## 2022-03-15 NOTE — PROGRESS NOTES
[address]  [address] [facility], [address] [place] [address] [place]  [phone]    Follow up Note      Effie Rodriguez     Date of Visit:  3/15/2022    CC:  Patient presents for follow up   Chief Complaint   Patient presents with    Follow-up    Back Pain     HPI:  Follow up on his Left side low back pain with no acute issues. Appropriate analgesia with current medications regimen: N/A    Change in quality of symptoms:no.    Medication side effects:none. Recent diagnostic testing:none  Recent interventional procedures:Left knee injection poor outcome.     He has not been on anticoagulation medications to include none. The patient  has not been on herbal supplements.  The patient is not diabetic.     Imaging:   Cervical spine MRI 2020  Findings compatible with degenerative changes     Cervical spine Xray 2020  Findings consistent with moderate degenerative disc disease of C5-C6   and mild degenerative facet disease.      Lumbar spine MRI 10/2019   Changes within the facets are abnormal. Findings are compatible with   degenerative facet disease.           At L5-S1: There is a mild broad-based disc herniation, there is mild   canal stenosis       At L4-5: There is a mild broad-based disc herniation, there is mild   canal stenosis, slightly more prominent towards the left extending   into the left neural foramina       At L3-4: There is no significant disc herniation, there is no   significant canal stenosis.       At L2-3: There is no significant disc herniation, there is no   significant canal stenosis.       At L1-2: There is no significant disc herniation, there is no   significant canal stenosis.      Lumbar spine flexsion/extension 08/2018  L4 spondylolysis with grade 1 spondylolisthesis. Approximately 4 mm translation on flexion-extension views.      Previous treatments: Physical Therapy and medications. Ella Tierney     Potential Aberrant Drug-Related Behavior:  No     Urine Drug Screening:  None, no opioids started      OARRS report:  03/2022 consistent     Opioid agreement:  Date started:N/A  Renewal date:N/A    Past Medical History:   Diagnosis Date    Chronic back pain     Kidney stone     Low back pain     Neck pain      Past Surgical History:   Procedure Laterality Date    ANESTHESIA NERVE BLOCK Right 1/13/2020    RIGHT L3,4 MEDIAL BRANCH AND DORSAL RAMUS BLOCK WITHOUT SEDATION (CPT 98253,08264) performed by Ana Pineda MD at [address] [place] [address] Right 2/20/2020    RIGHT L3,4 MEDIAL BRANCH AND L5 DORSAL RAMUS BLOCK (CPT 88353,07837) performed by Ana Pineda MD at [place] [address] Right 01/13/2020    mbb and dorsal rami    NERVE BLOCK Right 02/20/2020    medial branch block    NERVE BLOCK Right 09/03/2020    NERVE BLOCK Right 9/3/2020    RIGHT C3,4,5 MEDIAL BRANCH BLOCK (CPT 28924,73668) performed by Ana Pineda MD at [facility] Left 03/08/2021    Left L3, 4 Medial branch and L5 dorsal ramus block    NERVE BLOCK Left 3/8/2021    LEFT L3,4 MEDIAL BRANCH AND L5 DORSAL RAMUS BLOCK (CPT 40287) performed by Ana Pineda MD at [address] Right 6/11/2020    RIGHT L3, 4 MEDIAL BRANCH AND L5 DORSAL RAMUS RADIOFREQUENCY ABLATION performed by Ana Pineda MD at [address] Right 6/14/2021    RIGHT L3, 4, 5 MEDIAL BRANCH  RADIOFREQUENCY ABLATION (CPT 91378)(WANTS LAST CASE) performed by Ana Pineda MD at [address] [place] [address]     Prior to Admission medications    Medication Sig Start Date End Date Taking?  Authorizing Provider   Tens Unit MISC by Does not apply route 2/9/18  Yes Mima Enriquez, DO     No Known Allergies    Social History     Socioeconomic History    Marital status:      Spouse name: Not on file    Number of children: Not on file    Years of education: Not on file    Highest education level: Not on file Occupational History    Not on file   Tobacco Use    Smoking status: Former Smoker     Packs/day: 0.50     Years: 23.00     Pack years: 11.50     Types: Cigarettes     Quit date:      Years since quittin.2    Smokeless tobacco: Never Used   Vaping Use    Vaping Use: Never used   Substance and Sexual Activity    Alcohol use: No    Drug use: No    Sexual activity: Yes     Partners: Female   Other Topics Concern    Not on file   Social History Narrative    Not on file     Social Determinants of Health     Financial Resource Strain:     Difficulty of Paying Living Expenses: Not on file   Food Insecurity:     Worried About Running Out of Food in the Last Year: Not on file    Mohini of Food in the Last Year: Not on file   Transportation Needs:     Lack of Transportation (Medical): Not on file    Lack of Transportation (Non-Medical):  Not on file   Physical Activity:     Days of Exercise per Week: Not on file    Minutes of Exercise per Session: Not on file   Stress:     Feeling of Stress : Not on file   Social Connections:     Frequency of Communication with Friends and Family: Not on file    Frequency of Social Gatherings with Friends and Family: Not on file    Attends Temple Services: Not on file    Active Member of [address] [place] [address] [employer] or Organizations: Not on file    Attends Club or Organization Meetings: Not on file    Marital Status: Not on file   Intimate Partner Violence:     Fear of Current or Ex-Partner: Not on file    Emotionally Abused: Not on file    Physically Abused: Not on file    Sexually Abused: Not on file   Housing Stability:     Unable to Pay for Housing in the Last Year: Not on file    Number of [place] in the Last Year: Not on file    Unstable Housing in the Last Year: Not on file     Family History   Problem Relation Age of Onset    Heart Disease Mother     Diabetes Mother      REVIEW OF SYSTEMS:     Harpreet Keene denies fever/chills, chest pain, shortness of breath, new bowel or bladder complaints. All other review of systems was negative. PHYSICAL EXAMINATION:      /68   Pulse 58   Temp 97.1 °F (36.2 °C) (Infrared)   Resp 16   Ht 5' 9\" (1.753 m)   Wt 172 lb (78 kg)   SpO2 (!) 9%   BMI 25.40 kg/m²     General:      General appearance:   pleasant and well-hydrated. , in moderate discomfort and A & O x3  Build:Normal Weight    HEENT:    Head:normocephalic and atraumatic  Sclera: icterus absent,    Lungs:    Breathing:Breathing Pattern: regular, no distress    Abdomen:    Shape:non-distended and normal  Tenderness:none     Lumbar spine:    Spine inspection:normal   CVA tenderness:No   Palpation left facet tenderness,  Range of motion:not tested    Musculoskeletal:    Trigger points in Paraveteral:absent bilaterally   SI joint tenderness:negative right, negative left  SLR:negative right, negative left, sitting                 Extremities:    Tremors:None bilaterally upper and lower  Range of motion:normal ROM of bilateral hips. Intact:Yes  Edema:Normal    Neurological:    Sensory:normal to light touch bilateral upper and lower extremities. Motor:   Right Quadriceps5/5          Left Quadriceps5/5           Right Gastrocnemius5/5    Left Gastrocnemius5/5  Right Ant Tibialis5/5  Left Ant Tibialis5/5             [address]    Dermatology:    Skin:no unusual rashes and no skin lesions    Impression:    Chonic low back pain with radiation to the right buttocks and numbness in the Left leg. Lumbar spine MRI 2019 mild disc bulge at L4-5 with facet arthropathy. Patient had seen neurosurgery and is not a candidate for surgery. Good outcome with Right L3,4 MB and L5 DR RFA. Plan:  Follow up on his low back and left knee pain with no acute issues. Less than expected response with Left knee injection, patient encouraged to get left knee Xray. Hold off on Left L3,4,5 MB RFA. Continue with OTC pain medications.   OARRS report reviewed 03/2022.              Patient encouraged to stay active.              Treatment plan discussed with the patient including procedure side effects. We discussed with the patient that combining opioids, benzodiazepines, alcohol, illicit drugs or sleep aids increases the risk of respiratory depression including death. We discussed that these medications may cause drowsiness, sedation or dizziness and have counseled the patient not to drive or operate machinery. We have discussed that these medications will be prescribed only by one provider. We have discussed with the patient about age related risk factors and have thoroughly discussed the importance of taking these medications as prescribed. The patient verbalizes understanding. ccrvinicio Fox M.D.    3/15/2022

## 2022-03-15 NOTE — PROGRESS NOTES
Do you currently have any of the following:    Fever: No  Headache:  No  Cough: No  Shortness of breath: No  Exposed to anyone with these symptoms: No                                                                                                                Dallin Son presents to the [facility] on 3/15/2022. Jose Martin Russo is complaining of pain lower back. The pain is intermittent. The pain is described as aching and throbbing. Pain is rated on his best day at a 1, on his worst day at a 8, and on average at a 5 on the VAS scale. He took his last dose of . Jose Martin Russo does not have issues with constipation. Any procedures since your last visit: No,      He is not on NSAIDS and  is not on anticoagulation medications to include none . Pacemaker or defibrillator: No.    Medication Contract and Consent for Opioid Use Documents Filed      No documents found                   /68   Pulse 58   Temp 97.1 °F (36.2 °C) (Infrared)   Resp 16   Ht 5' 9\" (1.753 m)   Wt 172 lb (78 kg)   SpO2 (!) 9%   BMI 25.40 kg/m²      No LMP for male patient.

## 2022-07-12 ENCOUNTER — OFFICE VISIT (OUTPATIENT)
Dept: PAIN MANAGEMENT | Age: 42
End: 2022-07-12
Payer: MEDICAID

## 2022-07-12 VITALS
HEART RATE: 72 BPM | TEMPERATURE: 97.5 F | WEIGHT: 168 LBS | BODY MASS INDEX: 24.88 KG/M2 | SYSTOLIC BLOOD PRESSURE: 110 MMHG | DIASTOLIC BLOOD PRESSURE: 80 MMHG | HEIGHT: 69 IN

## 2022-07-12 DIAGNOSIS — M54.12 CERVICAL RADICULOPATHY: ICD-10-CM

## 2022-07-12 DIAGNOSIS — M47.812 CERVICAL FACET JOINT SYNDROME: ICD-10-CM

## 2022-07-12 DIAGNOSIS — M47.816 LUMBAR SPONDYLOSIS: ICD-10-CM

## 2022-07-12 DIAGNOSIS — G89.29 CHRONIC PAIN OF LEFT KNEE: Primary | ICD-10-CM

## 2022-07-12 DIAGNOSIS — G89.29 CHRONIC RIGHT SHOULDER PAIN: ICD-10-CM

## 2022-07-12 DIAGNOSIS — M19.011 ARTHRITIS OF RIGHT ACROMIOCLAVICULAR JOINT: ICD-10-CM

## 2022-07-12 DIAGNOSIS — M43.16 SPONDYLOLISTHESIS AT L4-L5 LEVEL: ICD-10-CM

## 2022-07-12 DIAGNOSIS — G89.4 CHRONIC PAIN SYNDROME: ICD-10-CM

## 2022-07-12 DIAGNOSIS — M25.511 CHRONIC RIGHT SHOULDER PAIN: ICD-10-CM

## 2022-07-12 DIAGNOSIS — M43.16 SPONDYLOLISTHESIS OF LUMBAR REGION: ICD-10-CM

## 2022-07-12 DIAGNOSIS — M47.812 CERVICAL SPONDYLOSIS: ICD-10-CM

## 2022-07-12 DIAGNOSIS — M47.816 LUMBAR FACET ARTHROPATHY: ICD-10-CM

## 2022-07-12 DIAGNOSIS — M51.9 LUMBAR DISC DISORDER: ICD-10-CM

## 2022-07-12 DIAGNOSIS — M25.562 CHRONIC PAIN OF LEFT KNEE: Primary | ICD-10-CM

## 2022-07-12 PROCEDURE — G8427 DOCREV CUR MEDS BY ELIG CLIN: HCPCS | Performed by: PAIN MEDICINE

## 2022-07-12 PROCEDURE — 1036F TOBACCO NON-USER: CPT | Performed by: PAIN MEDICINE

## 2022-07-12 PROCEDURE — 99213 OFFICE O/P EST LOW 20 MIN: CPT | Performed by: PAIN MEDICINE

## 2022-07-12 PROCEDURE — G8420 CALC BMI NORM PARAMETERS: HCPCS | Performed by: PAIN MEDICINE

## 2022-07-12 PROCEDURE — 99214 OFFICE O/P EST MOD 30 MIN: CPT | Performed by: PAIN MEDICINE

## 2022-07-12 RX ORDER — SODIUM CHLORIDE 9 MG/ML
INJECTION, SOLUTION INTRAVENOUS PRN
Status: CANCELLED | OUTPATIENT
Start: 2022-07-12

## 2022-07-12 RX ORDER — SODIUM CHLORIDE 0.9 % (FLUSH) 0.9 %
5-40 SYRINGE (ML) INJECTION PRN
Status: CANCELLED | OUTPATIENT
Start: 2022-07-12

## 2022-07-12 RX ORDER — SODIUM CHLORIDE 0.9 % (FLUSH) 0.9 %
5-40 SYRINGE (ML) INJECTION EVERY 12 HOURS SCHEDULED
Status: CANCELLED | OUTPATIENT
Start: 2022-07-12

## 2022-07-12 NOTE — PROGRESS NOTES
Do you currently have any of the following:    Fever: No  Headache:  No  Cough: No  Shortness of breath: No  Exposed to anyone with these symptoms: No                                                                                                                Ave Moran presents to the [address] [place] [address] on 7/12/2022. Josh Rojas is complaining of pain in back. The pain is intermittent. The pain is described as shooting and sharp. Pain is rated on his best day at a 0, on his worst day at a 10, and on average at a 0 on the VAS scale. He has not taken any pain medication for some time, does use TENS unit when needed. Josh Rojas does not have issues with constipation. Any procedures since your last visit: No.    He is not on NSAIDS and  is not on anticoagulation medications to include none and is managed by none. Pacemaker or defibrillator: No.    Medication Contract and Consent for Opioid Use Documents Filed      No documents found                   Temp 97.5 °F (36.4 °C) (Infrared)   Ht 5' 9\" (1.753 m)   Wt 168 lb (76.2 kg)   BMI 24.81 kg/m²      No LMP for male patient.

## 2022-07-12 NOTE — PROGRESS NOTES
[address]  [address] [facility], [address] [place] [address] [place]  [phone]    Follow up Note      Dane Vázquez     Date of Visit:  7/12/2022    CC:  Patient presents for follow up   Chief Complaint   Patient presents with    Back Pain     discuss ablation     HPI:  Worsening low back pain with no radiacular symptoms. Pain is described as sharp/constant. Pain is aggravated by twisting and bedning down. Appropriate analgesia with current medications regimen: N/A    Change in quality of symptoms:no.    Medication side effects:none. Recent diagnostic testing:none  Recent interventional procedures:none     He has not been on anticoagulation medications to include none. The patient  has not been on herbal supplements.  The patient is not diabetic.     Imaging:   Cervical spine MRI 2020  Findings compatible with degenerative changes     Cervical spine Xray 2020  Findings consistent with moderate degenerative disc disease of C5-C6   and mild degenerative facet disease.      Lumbar spine MRI 10/2019   Changes within the facets are abnormal. Findings are compatible with   degenerative facet disease.           At L5-S1: There is a mild broad-based disc herniation, there is mild   canal stenosis       At L4-5: There is a mild broad-based disc herniation, there is mild   canal stenosis, slightly more prominent towards the left extending   into the left neural foramina       At L3-4: There is no significant disc herniation, there is no   significant canal stenosis.       At L2-3: There is no significant disc herniation, there is no   significant canal stenosis.       At L1-2: There is no significant disc herniation, there is no   significant canal stenosis.      Lumbar spine flexsion/extension 08/2018  L4 spondylolysis with grade 1 spondylolisthesis. Approximately 4 mm translation on flexion-extension views.      Previous treatments: Physical Therapy and medications. Jake Calderon     Potential Aberrant Drug-Related Behavior:  No     Urine Drug Screening:  None, no opioids started      OARRS report:  07/2022 consistent     Opioid agreement:  Renewal date:N/A    Past Medical History:   Diagnosis Date    Chronic back pain     Kidney stone     Low back pain     Neck pain      Past Surgical History:   Procedure Laterality Date    ANESTHESIA NERVE BLOCK Right 1/13/2020    RIGHT L3,4 MEDIAL BRANCH AND DORSAL RAMUS BLOCK WITHOUT SEDATION (CPT 43775,19860) performed by Collin Puckett MD at [address] [place] [address] Right 2/20/2020    RIGHT L3,4 MEDIAL BRANCH AND L5 DORSAL RAMUS BLOCK (CPT 15908,86886) performed by Collin Puckett MD at [place] [address] Right 01/13/2020    mbb and dorsal rami    NERVE BLOCK Right 02/20/2020    medial branch block    NERVE BLOCK Right 09/03/2020    NERVE BLOCK Right 9/3/2020    RIGHT C3,4,5 MEDIAL BRANCH BLOCK (CPT 91297,33408) performed by Collin Puckett MD at [facility] Left 03/08/2021    Left L3, 4 Medial branch and L5 dorsal ramus block    NERVE BLOCK Left 3/8/2021    LEFT L3,4 MEDIAL BRANCH AND L5 DORSAL RAMUS BLOCK (CPT 42759) performed by Collin Puckett MD at [address] Right 6/11/2020    RIGHT L3, 4 MEDIAL BRANCH AND L5 DORSAL RAMUS RADIOFREQUENCY ABLATION performed by Collin Puckett MD at [address] Right 6/14/2021    RIGHT L3, 4, 5 MEDIAL BRANCH  RADIOFREQUENCY ABLATION (CPT 44687)(WANTS LAST CASE) performed by Collin Puckett MD at [address] [place] [address]     Prior to Admission medications    Medication Sig Start Date End Date Taking?  Authorizing Provider   Tens Unit MISC by Does not apply route 2/9/18  Yes Mima Enriquez, DO     No Known Allergies    Social History     Socioeconomic History    Marital status:      Spouse name: Not on file    Number of children: Not on file    Years of education: Not on file    Highest pain, shortness of breath, new bowel or bladder complaints. All other review of systems was negative. PHYSICAL EXAMINATION:      /80   Pulse 72   Temp 97.5 °F (36.4 °C) (Infrared)   Ht 5' 9\" (1.753 m)   Wt 168 lb (76.2 kg)   BMI 24.81 kg/m²     General:      General appearance:   pleasant and well-hydrated. , in moderate discomfort and A & O x3  Build:Normal Weight    HEENT:    Head:normocephalic and atraumatic  Sclera: icterus absent,    Lungs:    Breathing:Breathing Pattern: regular, no distress    Abdomen:    Shape:non-distended and normal  Tenderness:none     Lumbar spine:    Spine inspection:normal   CVA tenderness:No   Palpation left right facet tenderness,  Range of motion:painful extension/lateral bending. Musculoskeletal:    Trigger points in Paraveteral:absent bilaterally   SI joint tenderness:negative right, negative left  SLR:negative right, negative left, sitting                 Extremities:    Tremors:None bilaterally upper and lower  Range of motion:normal ROM of bilateral hips. Intact:Yes  Edema:Normal    Neurological:    Sensory:normal to light touch bilateral upper and lower extremities. Motor:   Right Quadriceps5/5          Left Quadriceps5/5           Right Gastrocnemius5/5    Left Gastrocnemius5/5  Right Ant Tibialis5/5  Left Ant Tibialis5/5    Reflexes:  Right Quadriceps reflex2+  Left Quadriceps reflex2+  Right Achilles reflex2+  Left Achilles reflex2+  Gait:antalgic    Dermatology:    Skin:no unusual rashes and no skin lesions    Impression:    Chonic low back pain with radiation to the right buttocks and numbness in the Left leg. Lumbar spine MRI 2019 mild disc bulge at L4-5 with facet arthropathy. Patient had seen neurosurgery and is not a candidate for surgery. Good outcome with Right L3,4 MB and L5 DR RFA. Plan:  Follow up on his low back and left knee pain with complaints of worsening low back pain with no radicular symptoms. Reviewed imaging studies.   Discussed bilateral L3,4,5 MB RFA, patient agrees. Continue with OTC pain medications. OARRS report reviewed 07/2022.              Patient encouraged to stay active.              Treatment plan discussed with the patient including procedure side effects. We discussed with the patient that combining opioids, benzodiazepines, alcohol, illicit drugs or sleep aids increases the risk of respiratory depression including death. We discussed that these medications may cause drowsiness, sedation or dizziness and have counseled the patient not to drive or operate machinery. We have discussed that these medications will be prescribed only by one provider. We have discussed with the patient about age related risk factors and have thoroughly discussed the importance of taking these medications as prescribed. The patient verbalizes understanding. danielle Starkey M.D.    7/12/2022

## 2022-08-03 ENCOUNTER — TELEPHONE (OUTPATIENT)
Dept: PAIN MANAGEMENT | Age: 42
End: 2022-08-03

## 2022-08-03 NOTE — TELEPHONE ENCOUNTER
Call to Mike Dozier that procedure was approved for 8/11/2022 and that the surgery center should call him a few days before for the pre op call and after 3:00 PM the business day before with the arrival time. Instructed to call office back if any questions. Conrad Calles verbalized understanding.      Waylan Severs RN  Pain Management

## 2022-08-11 ENCOUNTER — HOSPITAL ENCOUNTER (OUTPATIENT)
Age: 42
Setting detail: OUTPATIENT SURGERY
Discharge: HOME | End: 2022-08-11
Attending: PAIN MEDICINE | Admitting: PAIN MEDICINE
Payer: MEDICAID

## 2022-08-11 ENCOUNTER — HOSPITAL ENCOUNTER (OUTPATIENT)
Dept: OPERATING ROOM | Age: 42
Setting detail: OUTPATIENT SURGERY
Discharge: HOME | End: 2022-08-11
Attending: PAIN MEDICINE
Payer: MEDICAID

## 2022-08-11 VITALS
SYSTOLIC BLOOD PRESSURE: 128 MMHG | DIASTOLIC BLOOD PRESSURE: 89 MMHG | WEIGHT: 168 LBS | TEMPERATURE: 97 F | HEART RATE: 50 BPM | HEIGHT: 69 IN | OXYGEN SATURATION: 100 % | BODY MASS INDEX: 24.88 KG/M2 | RESPIRATION RATE: 14 BRPM

## 2022-08-11 DIAGNOSIS — M47.896 OTHER OSTEOARTHRITIS OF SPINE, LUMBAR REGION: ICD-10-CM

## 2022-08-11 PROCEDURE — 7100000010 HC PHASE II RECOVERY - FIRST 15 MIN: Performed by: PAIN MEDICINE

## 2022-08-11 PROCEDURE — C1713 ANCHOR/SCREW BN/BN,TIS/BN: HCPCS | Performed by: PAIN MEDICINE

## 2022-08-11 PROCEDURE — 3600000015 HC SURGERY LEVEL 5 ADDTL 15MIN: Performed by: PAIN MEDICINE

## 2022-08-11 PROCEDURE — 2709999900 HC NON-CHARGEABLE SUPPLY: Performed by: PAIN MEDICINE

## 2022-08-11 PROCEDURE — 6360000002 HC RX W HCPCS: Performed by: PAIN MEDICINE

## 2022-08-11 PROCEDURE — 3209999900 FLUORO FOR SURGICAL PROCEDURES

## 2022-08-11 PROCEDURE — 7100000011 HC PHASE II RECOVERY - ADDTL 15 MIN: Performed by: PAIN MEDICINE

## 2022-08-11 PROCEDURE — 2500000003 HC RX 250 WO HCPCS: Performed by: PAIN MEDICINE

## 2022-08-11 PROCEDURE — 3600000005 HC SURGERY LEVEL 5 BASE: Performed by: PAIN MEDICINE

## 2022-08-11 RX ORDER — LIDOCAINE HYDROCHLORIDE 20 MG/ML
INJECTION, SOLUTION EPIDURAL; INFILTRATION; INTRACAUDAL; PERINEURAL PRN
Status: DISCONTINUED | OUTPATIENT
Start: 2022-08-11 | End: 2022-08-11 | Stop reason: ALTCHOICE

## 2022-08-11 RX ORDER — SODIUM CHLORIDE 9 MG/ML
INJECTION, SOLUTION INTRAVENOUS PRN
Status: DISCONTINUED | OUTPATIENT
Start: 2022-08-11 | End: 2022-08-11 | Stop reason: HOSPADM

## 2022-08-11 RX ORDER — SODIUM CHLORIDE 0.9 % (FLUSH) 0.9 %
5-40 SYRINGE (ML) INJECTION PRN
Status: DISCONTINUED | OUTPATIENT
Start: 2022-08-11 | End: 2022-08-11 | Stop reason: HOSPADM

## 2022-08-11 RX ORDER — SODIUM CHLORIDE 0.9 % (FLUSH) 0.9 %
5-40 SYRINGE (ML) INJECTION EVERY 12 HOURS SCHEDULED
Status: DISCONTINUED | OUTPATIENT
Start: 2022-08-11 | End: 2022-08-11 | Stop reason: HOSPADM

## 2022-08-11 ASSESSMENT — PAIN DESCRIPTION - DESCRIPTORS
DESCRIPTORS: ACHING
DESCRIPTORS: ACHING

## 2022-08-11 ASSESSMENT — PAIN - FUNCTIONAL ASSESSMENT
PAIN_FUNCTIONAL_ASSESSMENT: 0-10
PAIN_FUNCTIONAL_ASSESSMENT: 0-10

## 2022-08-11 NOTE — H&P
[address]  [address] [facility], [facility] [place]  [phone]    Procedure History & Physical      [facility]     HPI:    Patient  is here for axial low back pain for bilateral L3,4,5 MB RFA. Labs/imaging studies reviewed   All question and concerns addressed including R/B/A associated with the procedure    Past Medical History:   Diagnosis Date    Chronic back pain     Kidney stone     Low back pain     Neck pain        Past Surgical History:   Procedure Laterality Date    ANESTHESIA NERVE BLOCK Right 1/13/2020    RIGHT L3,4 MEDIAL BRANCH AND DORSAL RAMUS BLOCK WITHOUT SEDATION (CPT 56107,77557) performed by Sina Gagnon MD at [facility] Right 2/20/2020    RIGHT L3,4 MEDIAL BRANCH AND L5 DORSAL RAMUS BLOCK (CPT 54613,43662) performed by Sina Gagnon MD at [address] [place] [address] Right 01/13/2020    mbb and dorsal rami    NERVE BLOCK Right 02/20/2020    medial branch block    NERVE BLOCK Right 09/03/2020    NERVE BLOCK Right 9/3/2020    RIGHT C3,4,5 MEDIAL BRANCH BLOCK (CPT 02290,74334) performed by Sina Gagnon MD at [address] [place] [address] Left 03/08/2021    Left L3, 4 Medial branch and L5 dorsal ramus block    NERVE BLOCK Left 3/8/2021    LEFT L3,4 MEDIAL BRANCH AND L5 DORSAL RAMUS BLOCK (CPT 98228) performed by Sina Gagnon MD at [address] [place] [address] Right 6/11/2020    RIGHT L3, 4 MEDIAL BRANCH AND L5 DORSAL RAMUS RADIOFREQUENCY ABLATION performed by Sina Gagnon MD at [address] [place] [address] Right 6/14/2021    RIGHT L3, 4, 5 MEDIAL BRANCH  RADIOFREQUENCY ABLATION (CPT 79090)(WANTS LAST CASE) performed by Sina Gagnon MD at [address] [place] [address] [facility]       Prior to Admission medications    Medication Sig Start Date End Date Taking?  Authorizing Provider   Tens Unit MISC by Does not apply route 2/9/18   Eber Enriquez, DO       No Known Allergies    Social History     Socioeconomic History    Marital status:      Spouse name: Not on file    Number of children: Not on file    Years of education: Not on file    Highest education level: Not on file   Occupational History    Not on file   Tobacco Use    Smoking status: Former     Packs/day: 0.50     Years: 23.00     Pack years: 11.50     Types: Cigarettes     Quit date:      Years since quittin.6    Smokeless tobacco: Never   Vaping Use    Vaping Use: Never used   Substance and Sexual Activity    Alcohol use: No    Drug use: No    Sexual activity: Yes     Partners: Female   Other Topics Concern    Not on file   Social History Narrative    Not on file     Social Determinants of Health     Financial Resource Strain: Not on file   Food Insecurity: Not on file   Transportation Needs: Not on file   Physical Activity: Not on file   Stress: Not on file   Social Connections: Not on file   Intimate Partner Violence: Not on file   Housing Stability: Not on file       Family History   Problem Relation Age of Onset    Heart Disease Mother     Diabetes Mother          REVIEW OF SYSTEMS:    CONSTITUTIONAL:  negative for  fevers, chills, sweats and fatigue    RESPIRATORY:  negative for  dry cough, cough with sputum, dyspnea, wheezing and chest pain    CARDIOVASCULAR:  negative for chest pain, dyspnea, palpitations, syncope    GASTROINTESTINAL:  negative for nausea, vomiting, change in bowel habits, diarrhea, constipation and abdominal pain    MUSCULOSKELETAL: negative for muscle weakness    SKIN: negative for itching or rashes.     BEHAVIOR/PSYCH:  negative for poor appetite, increased appetite, decreased sleep and poor concentration    All other systems negative      PHYSICAL EXAM:    VITALS:  BP (!) 135/92   Pulse 68   Temp 98 °F (36.7 °C) (Infrared)   Resp 14   Ht 5' 9\" (1.753 m)   Wt 168 lb (76.2 kg)   SpO2 98%   BMI 24.81 kg/m²     CONSTITUTIONAL:  awake, alert, cooperative, no apparent distress, and appears stated age    EYES: PERRLA, EOMI    LUNGS:  No increased work of breathing, no audible wheezing    CARDIOVASCULAR:  regular rate and rhythm    ABDOMEN:  Soft non tender non distended     EXTREMITIES: no signs of clubbing or cyanosis. MUSCULOSKELETAL: negative for flaccid muscle tone or spastic movements. SKIN: gross examination reveals no signs of rashes, or diaphoresis. NEURO: Cranial nerves II-XII grossly intact. No signs of agitated mood. Assessment/Plan:    Axial low back pain for bilateral L3,4,5 MB RFA.

## 2022-08-11 NOTE — DISCHARGE INSTRUCTIONS
[facility]) Pain Management Department  [phone]   Post-Pain Block/ Radiofrequency Home Going Instructions    1-Go home, rest for the remainder of the day  2-Please do not lift over 20 pounds the day of the injection  3-If you received sedation No: alcohol, driving, operating lawn mowers, plows, tractors or other dangerous equipment until next morning. Do not make important decisions or sign legal documents for 24 hours. You may experience light headedness, dizziness, nausea or sleepiness after sedation. Do not stay alone. A responsible adult must be with you for 24 hours. You could be nauseated from the medications you have received. Your IV site may be sore and bruised. 4-No dietary restrictions     5-Resume all medications the same day, blood thinners to be resumed 24 hours after injection    6-Keep the surgical site clean and dry, you may shower the next morning and remove the      dressing. 7- No sitz baths, tub baths or hot tubs/swimming for 24 hours. 8- If you have any pain at the injection site(s), application of an ice pack to the area should be       helpful, 20 minutes on/20 minutes off for next 48 hours. 9- [facility] at if you develop. Fever greater than 100.4 F  Have bleeding or drainage from the puncture site  Have progressive Leg/arm numbness and or weakness  Loss of control of bowel and or bladder (wet/soil yourself)  Severe headache with inability to lift head  10-You may return to work the next day          Nausea and Vomiting After Surgery: Care Instructions  Your Care Instructions     After you've had surgery, you may feel sick to your stomach (nauseated) or you may vomit. Sometimes anesthesia can make you feel sick. It's a common side effect and often doesn't last long. Pain also can make you feel sick or vomit. After the anesthesia wears off, you may feel pain from the incision (cut). That pain could then upset your stomach.  Taking pain medicine can also make you feelsick to your stomach. Whatever the cause, you may get medicine that can help. There are also somethings you can do at home to prevent nausea and feel better. The doctor has checked you carefully, but problems can develop later. If you notice any problems or new symptoms, get medical treatment right away. Follow-up care is a key part of your treatment and safety. Be sure to make and go to all appointments, and call your doctor if you are having problems. It's also a good idea to know your test results and keep alist of the medicines you take. How can you care for yourself at home? Be safe with medicines. Read and follow all instructions on the label. If the doctor gave you a prescription medicine for pain, take it as prescribed. If you are not taking a prescription pain medicine, ask your doctor if you can take an over-the-counter medicine. Take your pain medicine as soon as you have pain. It works better if you take it before the pain gets bad. Call your doctor if you have any problems with your medicine. Rest in bed until you feel better. To prevent dehydration, drink plenty of fluids. Choose water and other clear liquids until you feel better. If you have kidney, heart, or liver disease and have to limit fluids, talk with your doctor before you increase the amount of fluids you drink. When you are able to eat, try clear soups, mild foods, and liquids until all symptoms are gone for 12 to 48 hours. Other good choices include dry toast, crackers, cooked cereal, and gelatin dessert, such as Jell-O. Do not smoke. Smoking and being around smoke can make nausea worse. If you need help quitting, talk to your doctor about stop-smoking programs and medicines. These can increase your chances of quitting for good. When should you call for help? Call 911  anytime you think you may need emergency care. For example, call if:    You passed out (lost consciousness).    Call your doctor now or seek immediate medical care if:    You have new or worse nausea or vomiting. You are too sick to your stomach to drink any fluids. You cannot keep down fluids. You have symptoms of dehydration, such as:  Dry eyes and a dry mouth. Passing only a little urine. Feeling thirstier than usual.     Your pain medicine is not helping. You are dizzy or lightheaded, or you feel like you may faint. Watch closely for changes in your health, and be sure to contact your doctor if:    You do not get better as expected. Where can you learn more? Go to [email or web address] and sign in to your [employer] account. Enter M536 in the Proenza Schouer box to learn more about \"Nausea and Vomiting After Surgery: Care Instructions. \"     If you do not have an account, please click on the \"Sign Up Now\" link. Current as of: March 9, 2022               Content Version: 13.3  © 2006-2022 [employer]. Care instructions adapted under license by [facility] ([facility]). If you have questions about a medical condition or this instruction, always ask your healthcare professional. [place] [address] any warranty or liability for your use of this information. Infection After Surgery: Care Instructions  Overview  After surgery, an infection is always possible. It doesn't mean that thesurgery didn't go well. Because an infection can be serious, your doctor has taken steps to manage it. Your doctor checked the infection and cleaned it if necessary. Your doctor may have made an opening in the area so that the pus can drain out. You may have gauze in the cut so that the area will stay open and keep draining. You mayneed antibiotics. You will need to follow up with your doctor to make sure the infection has goneaway. Follow-up care is a key part of your treatment and safety. Be sure to make and go to all appointments, and call your doctor if you are having problems.  It's also a good idea to know your test results and keep alist of the medicines you take. How can you care for yourself at home? Make sure your surgeon knows about the infection, especially if you saw another doctor about your symptoms. If your doctor prescribed antibiotics, take them as directed. Do not stop taking them just because you feel better. You need to take the full course of antibiotics. Ask your doctor if you can take an over-the-counter pain medicine, such as acetaminophen (Tylenol), ibuprofen (Advil, Motrin), or naproxen (Aleve). Be safe with medicines. Read and follow all instructions on the label. Do not take two or more pain medicines at the same time unless the doctor told you to. Many pain medicines have acetaminophen, which is Tylenol. Too much acetaminophen (Tylenol) can be harmful. Prop up the area on a pillow anytime you sit or lie down during the next 3 days. Try to keep it above the level of your heart. This will help reduce swelling. Keep the skin clean and dry. You may have a bandage over the cut (incision). A bandage helps the incision heal and protects it. Your doctor will tell you how to take care of this. Keep it clean and dry. You may have drainage from the wound. If your doctor told you how to care for your incision, follow your doctor's instructions. If you did not get instructions, follow this general advice:  Wash around the incision with clean water 2 times a day. Don't use hydrogen peroxide or alcohol, which can slow healing. When should you call for help? Call your doctor now or seek immediate medical care if:    You have signs that your infection is getting worse, such as: Increased pain, swelling, warmth, or redness in the area. Red streaks leading from the area. Pus draining from the wound. A new or higher fever. Watch closely for changes in your health, and be sure to contact your doctor ifyou have any problems. Where can you learn more?   Go to [email or web address] and sign in to your [employer] account. Enter C340 in the [facility] box to learn more about \"Infection After Surgery: Care Instructions. \"     If you do not have an account, please click on the \"Sign Up Now\" link. Current as of: January 20, 2022               Content Version: 13.3  © 1912-6672 [facility], [facility]. Care instructions adapted under license by [facility] ([facility]). If you have questions about a medical condition or this instruction, always ask your healthcare professional. [place] [address] any warranty or liability for your use of this information.

## 2022-08-11 NOTE — OP NOTE
2022    Patient: Ada Thomas  :  1980  Age:  35 y.o. Sex:  male     PRE-OPERATIVE DIAGNOSIS: Bilateral Lumbar spondylosis, lumbar facet arthropathy. POST-OPERATIVE DIAGNOSIS: Same. PROCEDURE:  Fluoroscopic-guided Bilateral  Lumbar facet medial branch radiofrequency ablation at levels L3,4,5 MB. SURGEON:  BRAYAN Lane M.D. ANESTHESIA: Local    ESTIMATED BLOOD LOSS: None.  ______________________________________________________________________  HISTORY & PHYSICAL: Ada Thomas presents today for fluoroscopic-guided Bilateral lumbar facet medial branch radiofrequency ablation at levels L3,4,5 MB. The potential complications of the procedure were explained to eSan Barth again today and he has elected to undergo the aforementioned procedure. Marycarmen complete History & Physical examination were reviewed in depth, a copy of which is in the chart. DESCRIPTION OF PROCEDURE:    After confirming written and informed consent, a time-out was performed and Marycarmen name and date of birth, the procedure to be performed as well as the plan for the location of the needle insertion were confirmed. The patient was brought into the procedure room and placed in the prone position on the fluoroscopy table. Standard monitors were placed and vital signs were observed throughout the procedure. The area of the lumbar spine and upper buttocks was sterilely prepped with chloraprep and draped in a sterile manner. AP fluoroscopy was used to identify and frank bartons point at the targeted area. A 22 gauge 10 mm radiofrequency probe was advanced toward each of these points. Once bone was contacted, negative aspiration for blood and CSF was confirmed, sensory stimulation was performed at 50 Hz and at 0.4 volts generating a pressure sensation. Motor stimulation < 2.0 volts elicited multifidus twitching without any radicular symptoms.  1 ml of 2% lidocaine was injected prior to lesioning, which was performed for 90 seconds at 90 degrees centigrade. Once the lesions were complete, a solution of 0.25% marcaine 3 cc and 40 mg DepoMedrol was injected and distributed equally through each probe. The probes were removed . The patient's back was cleaned and bandages were placed over the needle insertion sites. Disposition the patient tolerated the procedure well and there were no complications . Vital signs remained stable throughout the procedure. The patient was escorted to the recovery area where they remained until discharge and written discharge instructions for the procedure were given. Plan: [facility] will return to our pain management center as scheduled.      Eber Bronson MD

## 2022-09-21 ENCOUNTER — OFFICE VISIT (OUTPATIENT)
Dept: PAIN MANAGEMENT | Age: 42
End: 2022-09-21
Payer: MEDICAID

## 2022-09-21 VITALS
HEIGHT: 69 IN | BODY MASS INDEX: 24.88 KG/M2 | SYSTOLIC BLOOD PRESSURE: 98 MMHG | DIASTOLIC BLOOD PRESSURE: 76 MMHG | TEMPERATURE: 96.9 F | HEART RATE: 67 BPM | OXYGEN SATURATION: 98 % | WEIGHT: 168 LBS

## 2022-09-21 DIAGNOSIS — M51.9 LUMBAR DISC DISORDER: ICD-10-CM

## 2022-09-21 DIAGNOSIS — G89.29 CHRONIC PAIN OF LEFT KNEE: ICD-10-CM

## 2022-09-21 DIAGNOSIS — M47.816 LUMBAR SPONDYLOSIS: ICD-10-CM

## 2022-09-21 DIAGNOSIS — M54.12 CERVICAL RADICULOPATHY: ICD-10-CM

## 2022-09-21 DIAGNOSIS — M43.16 SPONDYLOLISTHESIS OF LUMBAR REGION: ICD-10-CM

## 2022-09-21 DIAGNOSIS — M47.816 LUMBAR FACET ARTHROPATHY: Primary | ICD-10-CM

## 2022-09-21 DIAGNOSIS — M43.16 SPONDYLOLISTHESIS AT L4-L5 LEVEL: ICD-10-CM

## 2022-09-21 DIAGNOSIS — M25.562 CHRONIC PAIN OF LEFT KNEE: ICD-10-CM

## 2022-09-21 PROCEDURE — 1036F TOBACCO NON-USER: CPT | Performed by: PAIN MEDICINE

## 2022-09-21 PROCEDURE — G8420 CALC BMI NORM PARAMETERS: HCPCS | Performed by: PAIN MEDICINE

## 2022-09-21 PROCEDURE — 99213 OFFICE O/P EST LOW 20 MIN: CPT

## 2022-09-21 PROCEDURE — G8427 DOCREV CUR MEDS BY ELIG CLIN: HCPCS | Performed by: PAIN MEDICINE

## 2022-09-21 PROCEDURE — 99213 OFFICE O/P EST LOW 20 MIN: CPT | Performed by: PAIN MEDICINE

## 2022-09-21 NOTE — PROGRESS NOTES
Do you currently have any of the following:    Fever: No  Headache:  No  Cough: No  Shortness of breath: No  Exposed to anyone with these symptoms: No                                                                                                                Oris Lips presents to the [facility] on 9/21/2022. Isha Zepeda is complaining of pain in back. The pain is intermittent. The pain is described as numb. Pain is rated on his best day at a 0, on his worst day at a 0, and on average at a 0 on the VAS scale. He has not taken any pain medication. Isha Zepeda does not have issues with constipation. Any procedures since your last visit: Yes, with 100 % relief. He is not on NSAIDS and  is  not on anticoagulation medications to include none and is managed by none. Pacemaker or defibrillator: No.    Medication Contract and Consent for Opioid Use Documents Filed        No documents found                       There were no vitals taken for this visit. No LMP for male patient.

## 2022-09-21 NOTE — PROGRESS NOTES
[address]  [address] [facility], [address] [place] [address]  [phone]    Follow up Note      Heladio Aden     Date of Visit:  9/21/2022    CC:  Patient presents for follow up   Chief Complaint   Patient presents with    Follow Up After Procedure     Fluoroscopic-guided Bilateral  Lumbar facet medial branch radiofrequency ablation at levels L3,4,5 MB.     HPI:  Follow up on his low back pain with no acute issues. Appropriate analgesia with current medications regimen: N/A    Change in quality of symptoms:no. Medication side effects:none. Recent diagnostic testing:none  Recent interventional procedures:Bilateral L3,4,5 MB RFA     He has not been on anticoagulation medications to include none. The patient  has not been on herbal supplements. The patient is not diabetic. Imaging:   Cervical spine MRI 2020  Findings compatible with degenerative changes     Cervical spine Xray 2020  Findings consistent with moderate degenerative disc disease of C5-C6   and mild degenerative facet disease. Lumbar spine MRI 10/2019   Changes within the facets are abnormal. Findings are compatible with   degenerative facet disease. At L5-S1: There is a mild broad-based disc herniation, there is mild   canal stenosis       At L4-5: There is a mild broad-based disc herniation, there is mild   canal stenosis, slightly more prominent towards the left extending   into the left neural foramina       At L3-4: There is no significant disc herniation, there is no   significant canal stenosis. At L2-3: There is no significant disc herniation, there is no   significant canal stenosis. At L1-2: There is no significant disc herniation, there is no   significant canal stenosis. Lumbar spine flexsion/extension 08/2018  L4 spondylolysis with grade 1 spondylolisthesis. Approximately 4 mm translation on flexion-extension views.       Previous treatments: Physical Therapy and medications. .       Potential Aberrant Drug-Related Behavior:  No     Urine Drug Screening:  None, no opioids started      OARRS report:  09/2022 consistent     Opioid agreement:  Renewal date:N/A    Past Medical History:   Diagnosis Date    Chronic back pain     Kidney stone     Low back pain     Neck pain      Past Surgical History:   Procedure Laterality Date    ANESTHESIA NERVE BLOCK Right 1/13/2020    RIGHT L3,4 MEDIAL BRANCH AND DORSAL RAMUS BLOCK WITHOUT SEDATION (CPT 28445,33519) performed by Jaci Costello MD at [facility] Right 2/20/2020    RIGHT L3,4 MEDIAL BRANCH AND L5 DORSAL RAMUS BLOCK (CPT 52633,50621) performed by Jaci Costello MD at [address] Right 01/13/2020    mbb and dorsal rami    NERVE BLOCK Right 02/20/2020    medial branch block    NERVE BLOCK Right 09/03/2020    NERVE BLOCK Right 9/3/2020    RIGHT C3,4,5 MEDIAL BRANCH BLOCK (CPT 63534,61541) performed by Jaci Costello MD at [facility] Left 03/08/2021    Left L3, 4 Medial branch and L5 dorsal ramus block    NERVE BLOCK Left 3/8/2021    LEFT L3,4 MEDIAL BRANCH AND L5 DORSAL RAMUS BLOCK (CPT 44427) performed by Jaci Costello MD at [address] [place] [address] Right 6/11/2020    RIGHT L3, 4 MEDIAL BRANCH AND L5 DORSAL RAMUS RADIOFREQUENCY ABLATION performed by Jaci Costello MD at [address] [place] [address] Right 6/14/2021    RIGHT L3, 4, 5 MEDIAL BRANCH  RADIOFREQUENCY ABLATION (CPT 60419)(WANTS LAST CASE) performed by Jaci Costello MD at [address] [place] [address] Bilateral 8/11/2022    BILATERAL L3,4,5 MEDIAL [address] (CPT 04625) performed by Jaci Costello MD at [address] [place] [address]     Prior to Admission medications    Medication Sig Start Date End Date Taking?  Authorizing Provider   Tens Unit MISC by Does not apply route 2/9/18  Yes Mima Enriquez, DO     No Known Allergies    Social History     Socioeconomic History    Marital status:      Spouse name: Not on file    Number of children: Not on file    Years of education: Not on file    Highest education level: Not on file   Occupational History    Not on file   Tobacco Use    Smoking status: Former     Packs/day: 0.50     Years: 23.00     Pack years: 11.50     Types: Cigarettes     Quit date:      Years since quittin.7    Smokeless tobacco: Never   Vaping Use    Vaping Use: Never used   Substance and Sexual Activity    Alcohol use: No    Drug use: No    Sexual activity: Yes     Partners: Female   Other Topics Concern    Not on file   Social History Narrative    Not on file     Social Determinants of Health     Financial Resource Strain: Not on file   Food Insecurity: Not on file   Transportation Needs: Not on file   Physical Activity: Not on file   Stress: Not on file   Social Connections: Not on file   Intimate Partner Violence: Not on file   Housing Stability: Not on file     Family History   Problem Relation Age of Onset    Heart Disease Mother     Diabetes Mother      REVIEW OF SYSTEMS:     Dayami Camargo denies fever/chills, chest pain, shortness of breath, new bowel or bladder complaints. All other review of systems was negative. PHYSICAL EXAMINATION:      BP 98/76   Pulse 67   Temp 96.9 °F (36.1 °C) (Infrared)   Ht 5' 9\" (1.753 m)   Wt 168 lb (76.2 kg)   SpO2 98%   BMI 24.81 kg/m²     General:      General appearance:   pleasant and well-hydrated. , in no discomfort and A & O x3  Build:Normal Weight    HEENT:    Head:normocephalic and atraumatic  Sclera: icterus absent,    Lungs:    Breathing:Breathing Pattern: regular, no distress    Abdomen:    Shape:non-distended and normal  Tenderness:none     Lumbar spine:    Spine inspection:normal   CVA tenderness:No   Palpation negative for tenderness.   Range of motion:not tested    Musculoskeletal:    Trigger points in Paraveteral:absent bilaterally   SI joint tenderness:negative right, negative left  SLR:negative right, negative left, sitting                 Extremities:    Tremors:None bilaterally upper and lower  Range of motion:normal ROM of bilateral hips. Intact:Yes  Edema:Normal    Neurological:    Sensory:normal to light touch bilateral upper and lower extremities. Motor:   Right Quadriceps5/5          Left Quadriceps5/5           Right Gastrocnemius5/5    Left Gastrocnemius5/5  Right Ant Tibialis5/5  Left Ant Tibialis5/5    [address]    Dermatology:    Skin:no unusual rashes and no skin lesions    Impression:    Chonic low back pain with radiation to the right buttocks and numbness in the Left leg. Lumbar spine MRI 2019 mild disc bulge at L4-5 with facet arthropathy. Patient had seen neurosurgery and is not a candidate for surgery. Good outcome with Right L3,4 MB and L5 DR RFA. Plan:  Follow up on his low back and left knee pain with no acute issues  Patient is s/p bilateral L3,4,5 MB RFA with excellent relief, will repeat as needed. Continue with OTC pain medications. OARRS report reviewed 09/2022. Patient encouraged to stay active. Treatment plan discussed with the patient. We discussed with the patient that combining opioids, benzodiazepines, alcohol, illicit drugs or sleep aids increases the risk of respiratory depression including death. We discussed that these medications may cause drowsiness, sedation or dizziness and have counseled the patient not to drive or operate machinery. We have discussed that these medications will be prescribed only by one provider. We have discussed with the patient about age related risk factors and have thoroughly discussed the importance of taking these medications as prescribed. The patient verbalizes understanding. danielle Higuera M.D.    9/21/2022

## 2022-10-12 ENCOUNTER — APPOINTMENT (OUTPATIENT)
Dept: CT IMAGING | Age: 42
End: 2022-10-12
Payer: MEDICAID

## 2022-10-12 ENCOUNTER — HOSPITAL ENCOUNTER (EMERGENCY)
Age: 42
Discharge: HOME | End: 2022-10-12
Attending: EMERGENCY MEDICINE
Payer: MEDICAID

## 2022-10-12 VITALS
DIASTOLIC BLOOD PRESSURE: 76 MMHG | HEART RATE: 66 BPM | TEMPERATURE: 98 F | RESPIRATION RATE: 18 BRPM | HEIGHT: 69 IN | WEIGHT: 170 LBS | BODY MASS INDEX: 25.18 KG/M2 | SYSTOLIC BLOOD PRESSURE: 120 MMHG | OXYGEN SATURATION: 100 %

## 2022-10-12 DIAGNOSIS — N20.0 NEPHROLITHIASIS: Primary | ICD-10-CM

## 2022-10-12 LAB
ALBUMIN SERPL-MCNC: 4.2 G/DL (ref 3.5–5.2)
ALP BLD-CCNC: 88 U/L (ref 40–129)
ALT SERPL-CCNC: 24 U/L (ref 0–40)
ANION GAP SERPL CALCULATED.3IONS-SCNC: 8 MMOL/L (ref 7–16)
AST SERPL-CCNC: 23 U/L (ref 0–39)
BACTERIA: ABNORMAL /HPF
BASOPHILS ABSOLUTE: 0.05 E9/L (ref 0–0.2)
BASOPHILS RELATIVE PERCENT: 1 % (ref 0–2)
BILIRUB SERPL-MCNC: 0.8 MG/DL (ref 0–1.2)
BILIRUBIN URINE: NEGATIVE
BLOOD, URINE: NEGATIVE
BUN BLDV-MCNC: 8 MG/DL (ref 6–20)
CALCIUM SERPL-MCNC: 9.1 MG/DL (ref 8.6–10.2)
CHLORIDE BLD-SCNC: 103 MMOL/L (ref 98–107)
CLARITY: CLEAR
CO2: 29 MMOL/L (ref 22–29)
COLOR: YELLOW
CREAT SERPL-MCNC: 1.1 MG/DL (ref 0.7–1.2)
EKG ATRIAL RATE: 62 BPM
EKG P AXIS: 48 DEGREES
EKG P-R INTERVAL: 148 MS
EKG Q-T INTERVAL: 400 MS
EKG QRS DURATION: 110 MS
EKG QTC CALCULATION (BAZETT): 406 MS
EKG R AXIS: 37 DEGREES
EKG T AXIS: 24 DEGREES
EKG VENTRICULAR RATE: 62 BPM
EOSINOPHILS ABSOLUTE: 0.1 E9/L (ref 0.05–0.5)
EOSINOPHILS RELATIVE PERCENT: 2 % (ref 0–6)
GFR AFRICAN AMERICAN: >60
GFR NON-AFRICAN AMERICAN: >60 ML/MIN/1.73
GLUCOSE BLD-MCNC: 98 MG/DL (ref 74–99)
GLUCOSE URINE: NEGATIVE MG/DL
HCT VFR BLD CALC: 44.4 % (ref 37–54)
HEMOGLOBIN: 16 G/DL (ref 12.5–16.5)
IMMATURE GRANULOCYTES #: 0.01 E9/L
IMMATURE GRANULOCYTES %: 0.2 % (ref 0–5)
KETONES, URINE: NEGATIVE MG/DL
LEUKOCYTE ESTERASE, URINE: NEGATIVE
LYMPHOCYTES ABSOLUTE: 1.37 E9/L (ref 1.5–4)
LYMPHOCYTES RELATIVE PERCENT: 27.2 % (ref 20–42)
MCH RBC QN AUTO: 32.6 PG (ref 26–35)
MCHC RBC AUTO-ENTMCNC: 36 % (ref 32–34.5)
MCV RBC AUTO: 90.4 FL (ref 80–99.9)
MONOCYTES ABSOLUTE: 0.43 E9/L (ref 0.1–0.95)
MONOCYTES RELATIVE PERCENT: 8.5 % (ref 2–12)
MUCUS: PRESENT /LPF
NEUTROPHILS ABSOLUTE: 3.07 E9/L (ref 1.8–7.3)
NEUTROPHILS RELATIVE PERCENT: 61.1 % (ref 43–80)
NITRITE, URINE: NEGATIVE
PDW BLD-RTO: 13 FL (ref 11.5–15)
PH UA: 6 (ref 5–9)
PLATELET # BLD: 150 E9/L (ref 130–450)
PMV BLD AUTO: 11.8 FL (ref 7–12)
POTASSIUM REFLEX MAGNESIUM: 3.7 MMOL/L (ref 3.5–5)
PROTEIN UA: NEGATIVE MG/DL
RBC # BLD: 4.91 E12/L (ref 3.8–5.8)
RBC UA: ABNORMAL /HPF (ref 0–2)
SODIUM BLD-SCNC: 140 MMOL/L (ref 132–146)
SPECIFIC GRAVITY UA: 1.02 (ref 1–1.03)
TOTAL PROTEIN: 6.7 G/DL (ref 6.4–8.3)
UROBILINOGEN, URINE: 0.2 E.U./DL
WBC # BLD: 5 E9/L (ref 4.5–11.5)
WBC UA: ABNORMAL /HPF (ref 0–5)

## 2022-10-12 PROCEDURE — 80053 COMPREHEN METABOLIC PANEL: CPT

## 2022-10-12 PROCEDURE — 99284 EMERGENCY DEPT VISIT MOD MDM: CPT

## 2022-10-12 PROCEDURE — 85025 COMPLETE CBC W/AUTO DIFF WBC: CPT

## 2022-10-12 PROCEDURE — 93005 ELECTROCARDIOGRAM TRACING: CPT

## 2022-10-12 PROCEDURE — 81001 URINALYSIS AUTO W/SCOPE: CPT

## 2022-10-12 PROCEDURE — 87088 URINE BACTERIA CULTURE: CPT

## 2022-10-12 RX ORDER — TAMSULOSIN HYDROCHLORIDE 0.4 MG/1
0.4 CAPSULE ORAL DAILY
Qty: 14 CAPSULE | Refills: 0 | Status: SHIPPED | OUTPATIENT
Start: 2022-10-12 | End: 2022-10-26

## 2022-10-12 RX ORDER — 0.9 % SODIUM CHLORIDE 0.9 %
1000 INTRAVENOUS SOLUTION INTRAVENOUS ONCE
Status: DISCONTINUED | OUTPATIENT
Start: 2022-10-12 | End: 2022-10-12 | Stop reason: HOSPADM

## 2022-10-12 ASSESSMENT — ENCOUNTER SYMPTOMS
SORE THROAT: 0
SINUS PAIN: 0
NAUSEA: 0
ABDOMINAL PAIN: 1
VOMITING: 0
CONSTIPATION: 0
EYE DISCHARGE: 0
DIARRHEA: 0
COLOR CHANGE: 0
COUGH: 0
SHORTNESS OF BREATH: 0

## 2022-10-12 ASSESSMENT — PAIN SCALES - GENERAL
PAINLEVEL_OUTOF10: 0
PAINLEVEL_OUTOF10: 6

## 2022-10-12 ASSESSMENT — PAIN DESCRIPTION - ORIENTATION: ORIENTATION: LEFT

## 2022-10-12 ASSESSMENT — PAIN DESCRIPTION - LOCATION: LOCATION: FLANK

## 2022-10-12 ASSESSMENT — PAIN DESCRIPTION - DESCRIPTORS: DESCRIPTORS: ACHING;SHARP

## 2022-10-12 NOTE — ED PROVIDER NOTES
[facility] is a 35 y.o. male with a hx of kidney stones       Patient is a 35 y.o. male presents with a chief complaint of left flank pain  This has been occurring since yesterday. Patient states that it gets better with nothing. Patient states that it gets worse with nothing. Patient states that it is severe in severity. Patient states it was acute in onset. He notes that the pain started yesterday in his left flank and has been constant since, with decreasing urinary output since. He also notes left upper quadrant abdominal pain that he describes as feeling full. He denies any nausea or vomiting, fevers or chills, chest pain, shortness of breath, numbness or tingling, lightheaded or dizziness, leg swelling, or any other complaints. Patient notes he has a history of kidney stones and this feels similar to his previous stones. Review of Systems   Constitutional:  Negative for chills and fever. HENT:  Negative for congestion, sinus pain and sore throat. Eyes:  Negative for discharge and visual disturbance. Respiratory:  Negative for cough and shortness of breath. Cardiovascular:  Negative for chest pain and leg swelling. Gastrointestinal:  Positive for abdominal pain. Negative for constipation, diarrhea, nausea and vomiting. Endocrine: Negative for polyuria. Genitourinary:  Positive for decreased urine volume, difficulty urinating and flank pain. Negative for dysuria, frequency and hematuria. Musculoskeletal:  Negative for arthralgias and joint swelling. Skin:  Negative for color change and rash. Neurological:  Negative for dizziness, weakness, light-headedness, numbness and headaches. All other systems reviewed and are negative. Physical Exam  Constitutional:       General: He is not in acute distress. Appearance: Normal appearance. HENT:      Head: Normocephalic and atraumatic. Mouth/Throat:      Mouth: Mucous membranes are moist.      Pharynx: Oropharynx is clear. Eyes:      Extraocular Movements: Extraocular movements intact. Conjunctiva/sclera: Conjunctivae normal.      Pupils: Pupils are equal, round, and reactive to light. Cardiovascular:      Rate and Rhythm: Normal rate and regular rhythm. Pulses: Normal pulses. Heart sounds: Normal heart sounds. Pulmonary:      Effort: Pulmonary effort is normal.      Breath sounds: Normal breath sounds. Abdominal:      General: Abdomen is flat. Palpations: Abdomen is soft. Tenderness: There is abdominal tenderness in the right lower quadrant. There is no right CVA tenderness or left CVA tenderness. Musculoskeletal:         General: No swelling. Normal range of motion. Cervical back: Normal range of motion and neck supple. Skin:     General: Skin is warm and dry. Neurological:      General: No focal deficit present. Mental Status: He is alert and oriented to person, place, and time. Psychiatric:         Mood and Affect: Mood normal.         Behavior: Behavior normal.        Procedures     MDM  Number of Diagnoses or Management Options  Nephrolithiasis  Diagnosis management comments: Jinny Sanchez is a 35 y.o. male presenting to the ED with c/o flank and abdominal pain. Pt requested to leave ED while some labs were still pending as he passed kidney stone in urine and felt some improvement in his sx. CBC, CMP and UA unremarkable aside from mucus in urine. Pt given flomax on DC and advised to follow-up with PCP and discussed the risks associated with likely diagnosis of nephrolithiasis. He is agreeable to plan and understanding of concerning sx to return for. ED Course as of 10/12/22 2052   Wed Oct 12, 2022   4909 EKG: This EKG is signed by emergency department physician.     Rate: 62  Rhythm: Sinus  Interpretation: non-specific EKG and RSR' in V1 and V2  Comparison: stable as compared to patient's most recent EKG 6/4/21     [CP]   4629 Nursing reports pt urinated in specimen cup with apparent stone in sample. He is now feeling much improved and wishes to go home. He is refusing CT and IV fluids. Spoke with pt regarding the plan for CT and he insists he is feeling better and does not wish to have CT performed as this feels typical of his kidney stones and is no different. [CP]      ED Course User Index  [CP] Mary Jo Abebe DO        ED Course as of 10/12/22 2052   Wed Oct 12, 2022   7763 EKG: This EKG is signed by emergency department physician. Rate: 62  Rhythm: Sinus  Interpretation: non-specific EKG and RSR' in V1 and V2  Comparison: stable as compared to patient's most recent EKG 6/4/21     [CP]   3705 Nursing reports pt urinated in specimen cup with apparent stone in sample. He is now feeling much improved and wishes to go home. He is refusing CT and IV fluids. Spoke with pt regarding the plan for CT and he insists he is feeling better and does not wish to have CT performed as this feels typical of his kidney stones and is no different. [CP]      ED Course User Index  [CP] Mary Jo Abebe, DO       --------------------------------------------- PAST HISTORY ---------------------------------------------  Past Medical History:  has a past medical history of Chronic back pain, Kidney stone, Low back pain, and Neck pain. Past Surgical History:  has a past surgical history that includes Dental surgery; Nerve Block (Right, 01/13/2020); Anesthesia Nerve Block (Right, 1/13/2020); Nerve Block (Right, 02/20/2020); Anesthesia Nerve Block (Right, 2/20/2020); Pain management procedure (Right, 6/11/2020); Nerve Block (Right, 09/03/2020); Nerve Block (Right, 9/3/2020); Nerve Block (Left, 03/08/2021); Nerve Block (Left, 3/8/2021); Pain management procedure (Right, 6/14/2021); and Pain management procedure (Bilateral, 8/11/2022). Social History:  reports that he quit smoking about 9 years ago. His smoking use included cigarettes. He has a 11.50 pack-year smoking history.  He has never used smokeless tobacco. He reports that he does not drink alcohol and does not use drugs. Family History: family history includes Diabetes in his mother; Heart Disease in his mother. The patients home medications have been reviewed. Allergies: Patient has no known allergies.     -------------------------------------------------- RESULTS -------------------------------------------------  Labs:  Results for orders placed or performed during the hospital encounter of 10/12/22   CBC with Auto Differential   Result Value Ref Range    WBC 5.0 4.5 - 11.5 E9/L    RBC 4.91 3.80 - 5.80 E12/L    Hemoglobin 16.0 12.5 - 16.5 g/dL    Hematocrit 44.4 37.0 - 54.0 %    MCV 90.4 80.0 - 99.9 fL    MCH 32.6 26.0 - 35.0 pg    MCHC 36.0 (H) 32.0 - 34.5 %    RDW 13.0 11.5 - 15.0 fL    Platelets 827 282 - 268 E9/L    MPV 11.8 7.0 - 12.0 fL    Neutrophils % 61.1 43.0 - 80.0 %    Immature Granulocytes % 0.2 0.0 - 5.0 %    Lymphocytes % 27.2 20.0 - 42.0 %    Monocytes % 8.5 2.0 - 12.0 %    Eosinophils % 2.0 0.0 - 6.0 %    Basophils % 1.0 0.0 - 2.0 %    Neutrophils Absolute 3.07 1.80 - 7.30 E9/L    Immature Granulocytes # 0.01 E9/L    Lymphocytes Absolute 1.37 (L) 1.50 - 4.00 E9/L    Monocytes Absolute 0.43 0.10 - 0.95 E9/L    Eosinophils Absolute 0.10 0.05 - 0.50 E9/L    Basophils Absolute 0.05 0.00 - 0.20 E9/L   Comprehensive Metabolic Panel w/ Reflex to MG   Result Value Ref Range    Sodium 140 132 - 146 mmol/L    Potassium reflex Magnesium 3.7 3.5 - 5.0 mmol/L    Chloride 103 98 - 107 mmol/L    CO2 29 22 - 29 mmol/L    Anion Gap 8 7 - 16 mmol/L    Glucose 98 74 - 99 mg/dL    BUN 8 6 - 20 mg/dL    Creatinine 1.1 0.7 - 1.2 mg/dL    GFR Non-African American >60 >=60 mL/min/1.73    GFR African American >60     Calcium 9.1 8.6 - 10.2 mg/dL    Total Protein 6.7 6.4 - 8.3 g/dL    Albumin 4.2 3.5 - 5.2 g/dL    Total Bilirubin 0.8 0.0 - 1.2 mg/dL    Alkaline Phosphatase 88 40 - 129 U/L    ALT 24 0 - 40 U/L    AST 23 0 - 39 U/L   Urinalysis with Microscopic   Result Value Ref Range    Color, UA Yellow Straw/Yellow    Clarity, UA Clear Clear    Glucose, Ur Negative Negative mg/dL    Bilirubin Urine Negative Negative    Ketones, Urine Negative Negative mg/dL    Specific Gravity, UA 1.025 1.005 - 1.030    Blood, Urine Negative Negative    pH, UA 6.0 5.0 - 9.0    Protein, UA Negative Negative mg/dL    Urobilinogen, Urine 0.2 <2.0 E.U./dL    Nitrite, Urine Negative Negative    Leukocyte Esterase, Urine Negative Negative    Mucus, UA Present (A) None Seen /LPF    WBC, UA NONE 0 - 5 /HPF    RBC, UA 1-3 0 - 2 /HPF    Bacteria, UA NONE SEEN None Seen /HPF   EKG 12 Lead   Result Value Ref Range    Ventricular Rate 62 BPM    Atrial Rate 62 BPM    P-R Interval 148 ms    QRS Duration 110 ms    Q-T Interval 400 ms    QTc Calculation (Bazett) 406 ms    P Axis 48 degrees    R Axis 37 degrees    T Axis 24 degrees       Radiology:  No orders to display       ------------------------- NURSING NOTES AND VITALS REVIEWED ---------------------------  Date / Time Roomed:  10/12/2022  5:56 AM  ED Bed Assignment:  19/19    The nursing notes within the ED encounter and vital signs as below have been reviewed. /76   Pulse 66   Temp 98 °F (36.7 °C) (Oral)   Resp 18   Ht 5' 9\" (1.753 m)   Wt 170 lb (77.1 kg)   SpO2 100%   BMI 25.10 kg/m²   Oxygen Saturation Interpretation: Normal      ------------------------------------------ PROGRESS NOTES ------------------------------------------  8:49 PM EDT  I have spoken with the patient and discussed todays results, in addition to providing specific details for the plan of care and counseling regarding the diagnosis and prognosis. Their questions are answered at this time and they are agreeable with the plan. I discussed at length with them reasons for immediate return here for re evaluation. They will followup with their primary care physician by calling their office tomorrow.       --------------------------------- ADDITIONAL PROVIDER NOTES ---------------------------------  At this time the patient is without objective evidence of an acute process requiring hospitalization or inpatient management. They have remained hemodynamically stable throughout their entire ED visit and are stable for discharge with outpatient follow-up. The plan has been discussed in detail and they are aware of the specific conditions for emergent return, as well as the importance of follow-up. Discharge Medication List as of 10/12/2022  7:29 AM        START taking these medications    Details   tamsulosin (FLOMAX) 0.4 MG capsule Take 1 capsule by mouth daily for 14 days, Disp-14 capsule, R-0Normal             Diagnosis:  1. Nephrolithiasis        Disposition:  Patient's disposition: Discharge to home  Patient's condition is stable. Patient was given return precautions. Labs were interpreted by me. Patient will follow up with their primary care provider. Patient is agreeable to this plan. Patient has remained stable throughout their stay in the ED. Patient was seen and evaluated by myself and my attending Jin Romo DO. Assessment and Plan discussed with attending provider, please see attestation for final plan of care. This note was done using dictation software and there may be some grammatical errors associated with this.     DO Dell Kraus DO  Resident  10/12/22 3346 Consent Type: Consent 1 (Standard)

## 2022-10-12 NOTE — ED NOTES
Rn attempted to hang normal saline bolus, patient refused stating he does not want fluids when he can not urinate. Refuses straight cath/urinary catheter.       Emiliano Bosworth, RN  10/12/22 1282

## 2022-10-14 LAB — URINE CULTURE, ROUTINE: NORMAL

## 2022-10-21 ENCOUNTER — TELEPHONE (OUTPATIENT)
Dept: PRIMARY CARE CLINIC | Age: 42
End: 2022-10-21

## 2022-10-24 DIAGNOSIS — R30.0 DYSURIA: Primary | ICD-10-CM

## 2022-10-27 NOTE — TELEPHONE ENCOUNTER
Pt returned call and notified him that the order has been placed but I wasn't sure if it's the PSA, DIAGNOSTIC that he is getting done.  Please call him back to let him know

## 2022-10-31 ENCOUNTER — HOSPITAL ENCOUNTER (OUTPATIENT)
Age: 42
Discharge: HOME | End: 2022-10-31
Payer: MEDICAID

## 2022-10-31 ENCOUNTER — TELEPHONE (OUTPATIENT)
Dept: PRIMARY CARE CLINIC | Age: 42
End: 2022-10-31

## 2022-10-31 DIAGNOSIS — R30.0 DYSURIA: ICD-10-CM

## 2022-10-31 LAB — PROSTATE SPECIFIC ANTIGEN: 1.51 NG/ML (ref 0–4)

## 2022-10-31 PROCEDURE — 36415 COLL VENOUS BLD VENIPUNCTURE: CPT

## 2022-10-31 PROCEDURE — 84153 ASSAY OF PSA TOTAL: CPT

## 2022-10-31 NOTE — TELEPHONE ENCOUNTER
Pt is wondering if Dr Althea Saenz can order Colon Cancer Screening like cologuard; pt just wants to make sure he's healthy.  Please advise

## 2022-10-31 NOTE — TELEPHONE ENCOUNTER
Would not recommend he get something like this unless colon cancer is in his immediate family and if so what age they got diagnosed with it.     Thank Aicha Mukherjee

## 2023-05-05 ENCOUNTER — HOSPITAL ENCOUNTER (EMERGENCY)
Age: 43
Discharge: HOME | End: 2023-05-05
Attending: EMERGENCY MEDICINE
Payer: MEDICAID

## 2023-05-05 ENCOUNTER — APPOINTMENT (OUTPATIENT)
Dept: CT IMAGING | Age: 43
End: 2023-05-05
Payer: MEDICAID

## 2023-05-05 VITALS
WEIGHT: 163 LBS | DIASTOLIC BLOOD PRESSURE: 83 MMHG | HEIGHT: 69 IN | TEMPERATURE: 98.4 F | OXYGEN SATURATION: 98 % | HEART RATE: 64 BPM | RESPIRATION RATE: 16 BRPM | BODY MASS INDEX: 24.14 KG/M2 | SYSTOLIC BLOOD PRESSURE: 120 MMHG

## 2023-05-05 DIAGNOSIS — R10.9 LEFT FLANK PAIN: Primary | ICD-10-CM

## 2023-05-05 LAB
ALBUMIN SERPL-MCNC: 4.5 G/DL (ref 3.5–5.2)
ALP SERPL-CCNC: 88 U/L (ref 40–129)
ALT SERPL-CCNC: 20 U/L (ref 0–40)
ANION GAP SERPL CALCULATED.3IONS-SCNC: 7 MMOL/L (ref 7–16)
AST SERPL-CCNC: 18 U/L (ref 0–39)
BACTERIA URNS QL MICRO: NORMAL /HPF
BASOPHILS # BLD: 0.06 E9/L (ref 0–0.2)
BASOPHILS NFR BLD: 1 % (ref 0–2)
BILIRUB SERPL-MCNC: 0.5 MG/DL (ref 0–1.2)
BILIRUB UR QL STRIP: NEGATIVE
BUN SERPL-MCNC: 6 MG/DL (ref 6–20)
CALCIUM SERPL-MCNC: 9 MG/DL (ref 8.6–10.2)
CHLORIDE SERPL-SCNC: 102 MMOL/L (ref 98–107)
CLARITY UR: CLEAR
CO2 SERPL-SCNC: 31 MMOL/L (ref 22–29)
COLOR UR: YELLOW
CREAT SERPL-MCNC: 1 MG/DL (ref 0.7–1.2)
EOSINOPHIL # BLD: 0.09 E9/L (ref 0.05–0.5)
EOSINOPHIL NFR BLD: 1.5 % (ref 0–6)
ERYTHROCYTE [DISTWIDTH] IN BLOOD BY AUTOMATED COUNT: 13.1 FL (ref 11.5–15)
GLUCOSE SERPL-MCNC: 92 MG/DL (ref 74–99)
GLUCOSE UR STRIP-MCNC: NEGATIVE MG/DL
HCT VFR BLD AUTO: 43.8 % (ref 37–54)
HGB BLD-MCNC: 15.4 G/DL (ref 12.5–16.5)
HGB UR QL STRIP: NEGATIVE
IMM GRANULOCYTES # BLD: 0.01 E9/L
IMM GRANULOCYTES NFR BLD: 0.2 % (ref 0–5)
KETONES UR STRIP-MCNC: NEGATIVE MG/DL
LEUKOCYTE ESTERASE UR QL STRIP: NEGATIVE
LYMPHOCYTES # BLD: 1.58 E9/L (ref 1.5–4)
LYMPHOCYTES NFR BLD: 26.7 % (ref 20–42)
MCH RBC QN AUTO: 31.6 PG (ref 26–35)
MCHC RBC AUTO-ENTMCNC: 35.2 % (ref 32–34.5)
MCV RBC AUTO: 89.8 FL (ref 80–99.9)
MONOCYTES # BLD: 0.42 E9/L (ref 0.1–0.95)
MONOCYTES NFR BLD: 7.1 % (ref 2–12)
NEUTROPHILS # BLD: 3.75 E9/L (ref 1.8–7.3)
NEUTS SEG NFR BLD: 63.5 % (ref 43–80)
NITRITE UR QL STRIP: NEGATIVE
PH UR STRIP: 6 [PH] (ref 5–9)
PLATELET # BLD AUTO: 166 E9/L (ref 130–450)
PMV BLD AUTO: 11.7 FL (ref 7–12)
POTASSIUM SERPL-SCNC: 4.1 MMOL/L (ref 3.5–5)
PROT SERPL-MCNC: 6.8 G/DL (ref 6.4–8.3)
PROT UR STRIP-MCNC: NEGATIVE MG/DL
RBC # BLD AUTO: 4.88 E12/L (ref 3.8–5.8)
RBC #/AREA URNS HPF: NORMAL /HPF (ref 0–2)
SODIUM SERPL-SCNC: 140 MMOL/L (ref 132–146)
SP GR UR STRIP: 1.02 (ref 1–1.03)
UROBILINOGEN UR STRIP-ACNC: 0.2 E.U./DL
WBC # BLD: 5.9 E9/L (ref 4.5–11.5)
WBC #/AREA URNS HPF: NORMAL /HPF (ref 0–5)

## 2023-05-05 PROCEDURE — 81001 URINALYSIS AUTO W/SCOPE: CPT

## 2023-05-05 PROCEDURE — 96374 THER/PROPH/DIAG INJ IV PUSH: CPT

## 2023-05-05 PROCEDURE — 99284 EMERGENCY DEPT VISIT MOD MDM: CPT

## 2023-05-05 PROCEDURE — 74176 CT ABD & PELVIS W/O CONTRAST: CPT

## 2023-05-05 PROCEDURE — 85025 COMPLETE CBC W/AUTO DIFF WBC: CPT

## 2023-05-05 PROCEDURE — 80053 COMPREHEN METABOLIC PANEL: CPT

## 2023-05-05 PROCEDURE — 2580000003 HC RX 258: Performed by: EMERGENCY MEDICINE

## 2023-05-05 PROCEDURE — 6360000002 HC RX W HCPCS: Performed by: EMERGENCY MEDICINE

## 2023-05-05 RX ORDER — SODIUM CHLORIDE 0.9 % (FLUSH) 0.9 %
10 SYRINGE (ML) INJECTION PRN
Status: DISCONTINUED | OUTPATIENT
Start: 2023-05-05 | End: 2023-05-05 | Stop reason: HOSPADM

## 2023-05-05 RX ORDER — KETOROLAC TROMETHAMINE 30 MG/ML
30 INJECTION, SOLUTION INTRAMUSCULAR; INTRAVENOUS ONCE
Status: COMPLETED | OUTPATIENT
Start: 2023-05-05 | End: 2023-05-05

## 2023-05-05 RX ORDER — 0.9 % SODIUM CHLORIDE 0.9 %
1000 INTRAVENOUS SOLUTION INTRAVENOUS ONCE
Status: COMPLETED | OUTPATIENT
Start: 2023-05-05 | End: 2023-05-05

## 2023-05-05 RX ORDER — NAPROXEN 500 MG/1
500 TABLET ORAL 2 TIMES DAILY PRN
Qty: 60 TABLET | Refills: 0 | Status: SHIPPED | OUTPATIENT
Start: 2023-05-05

## 2023-05-05 RX ORDER — KETOROLAC TROMETHAMINE 30 MG/ML
30 INJECTION, SOLUTION INTRAMUSCULAR; INTRAVENOUS ONCE
Status: DISCONTINUED | OUTPATIENT
Start: 2023-05-05 | End: 2023-05-05

## 2023-05-05 RX ORDER — CYCLOBENZAPRINE HCL 10 MG
10 TABLET ORAL 3 TIMES DAILY PRN
Qty: 21 TABLET | Refills: 0 | Status: SHIPPED | OUTPATIENT
Start: 2023-05-05 | End: 2023-05-15

## 2023-05-05 RX ADMIN — SODIUM CHLORIDE 1000 ML: 9 INJECTION, SOLUTION INTRAVENOUS at 17:17

## 2023-05-05 RX ADMIN — KETOROLAC TROMETHAMINE 30 MG: 30 INJECTION, SOLUTION INTRAMUSCULAR; INTRAVENOUS at 17:18

## 2023-05-05 ASSESSMENT — PAIN DESCRIPTION - ORIENTATION: ORIENTATION: LEFT

## 2023-05-05 ASSESSMENT — PAIN DESCRIPTION - LOCATION: LOCATION: FLANK

## 2023-05-05 ASSESSMENT — PAIN SCALES - GENERAL: PAINLEVEL_OUTOF10: 4

## 2023-05-05 NOTE — ED PROVIDER NOTES
HPI:  5/5/23,   Time: 5:02 PM EDT       Chas Carroll is a 38 y.o. male presenting to the ED for left flank pain, beginning 4 days ago. The complaint has been intermittent, mild in severity, and worsened by nothing. History same. Thinks has kidney stone. Left knee. No fever/chills/sweats/nausea/vomiting. No dysuria/hematuria. Nothing makes better. Review of Systems:   Pertinent positives and negatives are stated within HPI, all other systems reviewed and are negative.          --------------------------------------------- PAST HISTORY ---------------------------------------------  Past Medical History:  has a past medical history of Chronic back pain, Kidney stone, Low back pain, and Neck pain. Past Surgical History:  has a past surgical history that includes Dental surgery; Nerve Block (Right, 01/13/2020); Anesthesia Nerve Block (Right, 1/13/2020); Nerve Block (Right, 02/20/2020); Anesthesia Nerve Block (Right, 2/20/2020); Pain management procedure (Right, 6/11/2020); Nerve Block (Right, 09/03/2020); Nerve Block (Right, 9/3/2020); Nerve Block (Left, 03/08/2021); Nerve Block (Left, 3/8/2021); Pain management procedure (Right, 6/14/2021); and Pain management procedure (Bilateral, 8/11/2022). Social History:  reports that he quit smoking about 10 years ago. His smoking use included cigarettes. He has a 11.50 pack-year smoking history. He has never used smokeless tobacco. He reports that he does not drink alcohol and does not use drugs. Family History: family history includes Diabetes in his mother; Heart Disease in his mother. The patients home medications have been reviewed. Allergies: Patient has no known allergies.         ---------------------------------------------------PHYSICAL EXAM--------------------------------------    Constitutional/General: Alert and oriented x3, well appearing, non toxic in NAD  Head: Normocephalic and atraumatic  Eyes: PERRL, EOMI, conjunctive normal, sclera

## 2023-05-11 ENCOUNTER — OFFICE VISIT (OUTPATIENT)
Dept: PRIMARY CARE CLINIC | Age: 43
End: 2023-05-11
Payer: MEDICAID

## 2023-05-11 VITALS
HEIGHT: 69 IN | WEIGHT: 171 LBS | OXYGEN SATURATION: 98 % | RESPIRATION RATE: 17 BRPM | SYSTOLIC BLOOD PRESSURE: 112 MMHG | TEMPERATURE: 97.3 F | DIASTOLIC BLOOD PRESSURE: 78 MMHG | BODY MASS INDEX: 25.33 KG/M2 | HEART RATE: 70 BPM

## 2023-05-11 DIAGNOSIS — R45.89 DEPRESSED MOOD: ICD-10-CM

## 2023-05-11 DIAGNOSIS — N20.0 KIDNEY STONES: Primary | ICD-10-CM

## 2023-05-11 PROCEDURE — G8427 DOCREV CUR MEDS BY ELIG CLIN: HCPCS | Performed by: STUDENT IN AN ORGANIZED HEALTH CARE EDUCATION/TRAINING PROGRAM

## 2023-05-11 PROCEDURE — 1036F TOBACCO NON-USER: CPT | Performed by: STUDENT IN AN ORGANIZED HEALTH CARE EDUCATION/TRAINING PROGRAM

## 2023-05-11 PROCEDURE — G8419 CALC BMI OUT NRM PARAM NOF/U: HCPCS | Performed by: STUDENT IN AN ORGANIZED HEALTH CARE EDUCATION/TRAINING PROGRAM

## 2023-05-11 PROCEDURE — 99213 OFFICE O/P EST LOW 20 MIN: CPT | Performed by: STUDENT IN AN ORGANIZED HEALTH CARE EDUCATION/TRAINING PROGRAM

## 2023-05-11 SDOH — ECONOMIC STABILITY: HOUSING INSECURITY
IN THE LAST 12 MONTHS, WAS THERE A TIME WHEN YOU DID NOT HAVE A STEADY PLACE TO SLEEP OR SLEPT IN A SHELTER (INCLUDING NOW)?: NO

## 2023-05-11 SDOH — ECONOMIC STABILITY: FOOD INSECURITY: WITHIN THE PAST 12 MONTHS, THE FOOD YOU BOUGHT JUST DIDN'T LAST AND YOU DIDN'T HAVE MONEY TO GET MORE.: PATIENT DECLINED

## 2023-05-11 SDOH — ECONOMIC STABILITY: INCOME INSECURITY: HOW HARD IS IT FOR YOU TO PAY FOR THE VERY BASICS LIKE FOOD, HOUSING, MEDICAL CARE, AND HEATING?: NOT VERY HARD

## 2023-05-11 SDOH — ECONOMIC STABILITY: FOOD INSECURITY: WITHIN THE PAST 12 MONTHS, YOU WORRIED THAT YOUR FOOD WOULD RUN OUT BEFORE YOU GOT MONEY TO BUY MORE.: NEVER TRUE

## 2023-05-11 ASSESSMENT — PATIENT HEALTH QUESTIONNAIRE - PHQ9
SUM OF ALL RESPONSES TO PHQ QUESTIONS 1-9: 7
2. FEELING DOWN, DEPRESSED OR HOPELESS: 1
10. IF YOU CHECKED OFF ANY PROBLEMS, HOW DIFFICULT HAVE THESE PROBLEMS MADE IT FOR YOU TO DO YOUR WORK, TAKE CARE OF THINGS AT HOME, OR GET ALONG WITH OTHER PEOPLE: 0
SUM OF ALL RESPONSES TO PHQ QUESTIONS 1-9: 8
5. POOR APPETITE OR OVEREATING: 1
SUM OF ALL RESPONSES TO PHQ9 QUESTIONS 1 & 2: 3
4. FEELING TIRED OR HAVING LITTLE ENERGY: 1
3. TROUBLE FALLING OR STAYING ASLEEP: 1
7. TROUBLE CONCENTRATING ON THINGS, SUCH AS READING THE NEWSPAPER OR WATCHING TELEVISION: 0
6. FEELING BAD ABOUT YOURSELF - OR THAT YOU ARE A FAILURE OR HAVE LET YOURSELF OR YOUR FAMILY DOWN: 1
9. THOUGHTS THAT YOU WOULD BE BETTER OFF DEAD, OR OF HURTING YOURSELF: 1
1. LITTLE INTEREST OR PLEASURE IN DOING THINGS: 2
8. MOVING OR SPEAKING SO SLOWLY THAT OTHER PEOPLE COULD HAVE NOTICED. OR THE OPPOSITE, BEING SO FIGETY OR RESTLESS THAT YOU HAVE BEEN MOVING AROUND A LOT MORE THAN USUAL: 0

## 2023-05-11 NOTE — PROGRESS NOTES
effects of medicationbefore taking. Patient and/or guardian given opportunity to ask questions/raise concerns. The patient verbalized comfort and understanding ofinstructions. I encourage further reading and education about your health conditions. Information on many health conditions is provided by [facility] [employer]: [email or web address]  Please bring any questions to me at your nextvisit. Return to Office: Return in about 1 month (around 6/11/2023) for f/u stones and mood . Medication List:    Current Outpatient Medications   Medication Sig Dispense Refill    naproxen (NAPROSYN) 500 MG tablet Take 1 tablet by mouth 2 times daily as needed for Pain 60 tablet 0    cyclobenzaprine (FLEXERIL) 10 MG tablet Take 1 tablet by mouth 3 times daily as needed for Muscle spasms 21 tablet 0    tamsulosin (FLOMAX) 0.4 MG capsule Take 1 capsule by mouth daily for 14 days 14 capsule 0    Tens Unit MISC by Does not apply route 1 each 0     No current facility-administered medications for this visit. Steph Calero,        This document may have been prepared at least partially through the use of voice recognition software. Although effort is taken to assure the accuracy ofthis document, it is possible that grammatical, syntax,  or spelling errors may occur.

## 2023-05-17 ENCOUNTER — OFFICE VISIT (OUTPATIENT)
Dept: PAIN MANAGEMENT | Age: 43
End: 2023-05-17
Payer: MEDICAID

## 2023-05-17 VITALS
WEIGHT: 171 LBS | SYSTOLIC BLOOD PRESSURE: 108 MMHG | RESPIRATION RATE: 18 BRPM | HEIGHT: 69 IN | OXYGEN SATURATION: 98 % | BODY MASS INDEX: 25.33 KG/M2 | DIASTOLIC BLOOD PRESSURE: 70 MMHG | TEMPERATURE: 97.8 F | HEART RATE: 60 BPM

## 2023-05-17 DIAGNOSIS — M19.011 ARTHRITIS OF RIGHT ACROMIOCLAVICULAR JOINT: ICD-10-CM

## 2023-05-17 DIAGNOSIS — M51.9 LUMBAR DISC DISORDER: ICD-10-CM

## 2023-05-17 DIAGNOSIS — G89.29 CHRONIC RIGHT SHOULDER PAIN: ICD-10-CM

## 2023-05-17 DIAGNOSIS — M47.816 LUMBAR SPONDYLOSIS: ICD-10-CM

## 2023-05-17 DIAGNOSIS — M47.812 CERVICAL FACET JOINT SYNDROME: ICD-10-CM

## 2023-05-17 DIAGNOSIS — M43.16 SPONDYLOLISTHESIS AT L4-L5 LEVEL: ICD-10-CM

## 2023-05-17 DIAGNOSIS — M43.16 SPONDYLOLISTHESIS OF LUMBAR REGION: ICD-10-CM

## 2023-05-17 DIAGNOSIS — M47.812 CERVICAL SPONDYLOSIS: ICD-10-CM

## 2023-05-17 DIAGNOSIS — M47.816 LUMBAR FACET ARTHROPATHY: ICD-10-CM

## 2023-05-17 DIAGNOSIS — G89.29 CHRONIC PAIN OF LEFT KNEE: ICD-10-CM

## 2023-05-17 DIAGNOSIS — M25.511 CHRONIC RIGHT SHOULDER PAIN: ICD-10-CM

## 2023-05-17 DIAGNOSIS — M54.12 CERVICAL RADICULOPATHY: ICD-10-CM

## 2023-05-17 DIAGNOSIS — G89.4 CHRONIC PAIN SYNDROME: Primary | ICD-10-CM

## 2023-05-17 DIAGNOSIS — M25.562 CHRONIC PAIN OF LEFT KNEE: ICD-10-CM

## 2023-05-17 PROCEDURE — 1036F TOBACCO NON-USER: CPT | Performed by: PAIN MEDICINE

## 2023-05-17 PROCEDURE — 99213 OFFICE O/P EST LOW 20 MIN: CPT | Performed by: PAIN MEDICINE

## 2023-05-17 PROCEDURE — 99214 OFFICE O/P EST MOD 30 MIN: CPT | Performed by: PAIN MEDICINE

## 2023-05-17 PROCEDURE — G8427 DOCREV CUR MEDS BY ELIG CLIN: HCPCS | Performed by: PAIN MEDICINE

## 2023-05-17 PROCEDURE — G8419 CALC BMI OUT NRM PARAM NOF/U: HCPCS | Performed by: PAIN MEDICINE

## 2023-05-17 RX ORDER — SODIUM CHLORIDE 9 MG/ML
INJECTION, SOLUTION INTRAVENOUS PRN
OUTPATIENT
Start: 2023-05-17

## 2023-05-17 RX ORDER — SODIUM CHLORIDE 0.9 % (FLUSH) 0.9 %
5-40 SYRINGE (ML) INJECTION EVERY 12 HOURS SCHEDULED
OUTPATIENT
Start: 2023-05-17

## 2023-05-17 RX ORDER — SODIUM CHLORIDE 0.9 % (FLUSH) 0.9 %
5-40 SYRINGE (ML) INJECTION PRN
OUTPATIENT
Start: 2023-05-17

## 2023-05-17 NOTE — PROGRESS NOTES
Noemi Zavala presents to the [facility] on 5/17/2023. Linda Espitia is complaining of pain low back, left side. The pain is intermittent. The pain is described as stabbing. Pain is rated on his best day at a 0, on his worst day at a 9, and on average at a 4 on the VAS scale. He took his last dose of  none  N/A. Linda Espitia does not have issues with constipation. Any procedures since your last visit: No  He is not on NSAIDS and  is not on anticoagulation medications. Pacemaker or defibrillator: No  Medication Contract and Consent for Opioid Use Documents Filed        No documents found                       /70   Pulse 60   Temp 97.8 °F (36.6 °C) (Infrared)   Resp 18   Ht 5' 9\" (1.753 m)   Wt 171 lb (77.6 kg)   SpO2 98%   BMI 25.25 kg/m²      No LMP for male patient.
procedure side effects. .    We discussed with the patient that combining opioids, benzodiazepines, alcohol, illicit drugs or sleep aids increases the risk of respiratory depression including death. We discussed that these medications may cause drowsiness, sedation or dizziness and have counseled the patient not to drive or operate machinery. We have discussed that these medications will be prescribed only by one provider. We have discussed with the patient about age related risk factors and have thoroughly discussed the importance of taking these medications as prescribed. The patient verbalizes understanding. danielle Woodward M.D.    5/17/2023

## 2023-06-06 ENCOUNTER — PREP FOR PROCEDURE (OUTPATIENT)
Dept: PAIN MANAGEMENT | Age: 43
End: 2023-06-06

## 2023-06-13 DIAGNOSIS — E66.3 OVERWEIGHT: ICD-10-CM

## 2023-06-13 DIAGNOSIS — Z00.00 ANNUAL PHYSICAL EXAM: ICD-10-CM

## 2023-06-13 DIAGNOSIS — N20.0 KIDNEY STONES: ICD-10-CM

## 2023-06-13 DIAGNOSIS — R45.89 DEPRESSED MOOD: ICD-10-CM

## 2023-06-13 LAB
ALBUMIN SERPL-MCNC: 4.1 G/DL (ref 3.5–5.2)
ALP SERPL-CCNC: 85 U/L (ref 40–129)
ALT SERPL-CCNC: 22 U/L (ref 0–40)
ANION GAP SERPL CALCULATED.3IONS-SCNC: 10 MMOL/L (ref 7–16)
AST SERPL-CCNC: 20 U/L (ref 0–39)
BILIRUB SERPL-MCNC: 0.8 MG/DL (ref 0–1.2)
BUN SERPL-MCNC: 11 MG/DL (ref 6–20)
CALCIUM SERPL-MCNC: 9.3 MG/DL (ref 8.6–10.2)
CHLORIDE SERPL-SCNC: 102 MMOL/L (ref 98–107)
CHOLESTEROL, TOTAL: 167 MG/DL (ref 0–199)
CO2 SERPL-SCNC: 28 MMOL/L (ref 22–29)
CREAT SERPL-MCNC: 1 MG/DL (ref 0.7–1.2)
ERYTHROCYTE [DISTWIDTH] IN BLOOD BY AUTOMATED COUNT: 13 FL (ref 11.5–15)
GLUCOSE SERPL-MCNC: 84 MG/DL (ref 74–99)
HCT VFR BLD AUTO: 49.9 % (ref 37–54)
HDLC SERPL-MCNC: 52 MG/DL
HGB BLD-MCNC: 16.7 G/DL (ref 12.5–16.5)
LDLC SERPL CALC-MCNC: 92 MG/DL (ref 0–99)
MCH RBC QN AUTO: 32.4 PG (ref 26–35)
MCHC RBC AUTO-ENTMCNC: 33.5 % (ref 32–34.5)
MCV RBC AUTO: 96.7 FL (ref 80–99.9)
PLATELET # BLD AUTO: 149 E9/L (ref 130–450)
PMV BLD AUTO: 12.6 FL (ref 7–12)
POTASSIUM SERPL-SCNC: 4 MMOL/L (ref 3.5–5)
PROT SERPL-MCNC: 6.9 G/DL (ref 6.4–8.3)
RBC # BLD AUTO: 5.16 E12/L (ref 3.8–5.8)
SODIUM SERPL-SCNC: 140 MMOL/L (ref 132–146)
TRIGL SERPL-MCNC: 113 MG/DL (ref 0–149)
TSH SERPL-MCNC: 3.68 UIU/ML (ref 0.27–4.2)
VLDLC SERPL CALC-MCNC: 23 MG/DL
WBC # BLD: 6.9 E9/L (ref 4.5–11.5)

## 2023-07-16 ENCOUNTER — HOSPITAL ENCOUNTER (EMERGENCY)
Age: 43
Discharge: HOME | End: 2023-07-17
Payer: MEDICAID

## 2023-07-16 ENCOUNTER — APPOINTMENT (OUTPATIENT)
Dept: CT IMAGING | Age: 43
End: 2023-07-16
Payer: MEDICAID

## 2023-07-16 DIAGNOSIS — R10.13 ABDOMINAL PAIN, EPIGASTRIC: ICD-10-CM

## 2023-07-16 DIAGNOSIS — L03.032 CELLULITIS OF THIRD TOE OF LEFT FOOT: Primary | ICD-10-CM

## 2023-07-16 LAB
ALBUMIN SERPL-MCNC: 4.8 G/DL (ref 3.5–5.2)
ALP SERPL-CCNC: 91 U/L (ref 40–129)
ALT SERPL-CCNC: 33 U/L (ref 0–40)
ANION GAP SERPL CALCULATED.3IONS-SCNC: 12 MMOL/L (ref 7–16)
AST SERPL-CCNC: 34 U/L (ref 0–39)
BASOPHILS # BLD: 0.05 K/UL (ref 0–0.2)
BASOPHILS NFR BLD: 1 % (ref 0–2)
BILIRUB DIRECT SERPL-MCNC: 0.4 MG/DL (ref 0–0.3)
BILIRUB INDIRECT SERPL-MCNC: 1.9 MG/DL (ref 0–1)
BILIRUB SERPL-MCNC: 2.3 MG/DL (ref 0–1.2)
BILIRUB UR QL STRIP: NEGATIVE
BUN SERPL-MCNC: 10 MG/DL (ref 6–20)
CALCIUM SERPL-MCNC: 9.5 MG/DL (ref 8.6–10.2)
CHLORIDE SERPL-SCNC: 98 MMOL/L (ref 98–107)
CLARITY UR: CLEAR
CO2 SERPL-SCNC: 27 MMOL/L (ref 22–29)
COLOR UR: YELLOW
CREAT SERPL-MCNC: 1.1 MG/DL (ref 0.7–1.2)
EOSINOPHIL # BLD: 0 K/UL (ref 0.05–0.5)
EOSINOPHILS RELATIVE PERCENT: 0 % (ref 0–6)
ERYTHROCYTE [DISTWIDTH] IN BLOOD BY AUTOMATED COUNT: 12.4 % (ref 11.5–15)
GFR SERPL CREATININE-BSD FRML MDRD: >60 ML/MIN/1.73M2
GLUCOSE SERPL-MCNC: 104 MG/DL (ref 74–99)
GLUCOSE UR STRIP-MCNC: NEGATIVE MG/DL
HCT VFR BLD AUTO: 45.6 % (ref 37–54)
HGB BLD-MCNC: 16.5 G/DL (ref 12.5–16.5)
HGB UR QL STRIP.AUTO: NEGATIVE
KETONES UR STRIP-MCNC: NEGATIVE MG/DL
LACTATE BLDV-SCNC: 1 MMOL/L (ref 0.5–2.2)
LEUKOCYTE ESTERASE UR QL STRIP: NEGATIVE
LIPASE SERPL-CCNC: 71 U/L (ref 13–60)
LYMPHOCYTES # BLD: 9 % (ref 20–42)
LYMPHOCYTES NFR BLD: 0.47 K/UL (ref 1.5–4)
MCH RBC QN AUTO: 32.4 PG (ref 26–35)
MCHC RBC AUTO-ENTMCNC: 36.2 G/DL (ref 32–34.5)
MCV RBC AUTO: 89.4 FL (ref 80–99.9)
MONOCYTES NFR BLD: 0.09 K/UL (ref 0.1–0.95)
MONOCYTES NFR BLD: 2 % (ref 2–12)
NEUTROPHILS NFR BLD: 89 % (ref 43–80)
NEUTS SEG NFR BLD: 4.88 K/UL (ref 1.8–7.3)
NITRITE UR QL STRIP: NEGATIVE
PH UR STRIP: 7.5 [PH] (ref 5–9)
PLATELET, FLUORESCENCE: 132 K/UL (ref 130–450)
PMV BLD AUTO: 11.7 FL (ref 7–12)
POTASSIUM SERPL-SCNC: 3.9 MMOL/L (ref 3.5–5)
PROT SERPL-MCNC: 7.8 G/DL (ref 6.4–8.3)
PROT UR STRIP-MCNC: NEGATIVE MG/DL
RBC # BLD AUTO: 5.1 M/UL (ref 3.8–5.8)
RBC #/AREA URNS HPF: ABNORMAL /HPF
SARS-COV-2 RDRP RESP QL NAA+PROBE: NOT DETECTED
SODIUM SERPL-SCNC: 137 MMOL/L (ref 132–146)
SP GR UR STRIP: 1.01 (ref 1–1.03)
SPECIMEN DESCRIPTION: NORMAL
UROBILINOGEN UR STRIP-ACNC: >8 EU/DL (ref 0–1)
WBC #/AREA URNS HPF: ABNORMAL /HPF
WBC OTHER # BLD: 5.5 K/UL (ref 4.5–11.5)

## 2023-07-16 PROCEDURE — 83605 ASSAY OF LACTIC ACID: CPT

## 2023-07-16 PROCEDURE — 2580000003 HC RX 258: Performed by: PHYSICIAN ASSISTANT

## 2023-07-16 PROCEDURE — 6370000000 HC RX 637 (ALT 250 FOR IP): Performed by: PHYSICIAN ASSISTANT

## 2023-07-16 PROCEDURE — 80048 BASIC METABOLIC PNL TOTAL CA: CPT

## 2023-07-16 PROCEDURE — 87635 SARS-COV-2 COVID-19 AMP PRB: CPT

## 2023-07-16 PROCEDURE — 81001 URINALYSIS AUTO W/SCOPE: CPT

## 2023-07-16 PROCEDURE — 6360000004 HC RX CONTRAST MEDICATION: Performed by: RADIOLOGY

## 2023-07-16 PROCEDURE — 96361 HYDRATE IV INFUSION ADD-ON: CPT

## 2023-07-16 PROCEDURE — 85027 COMPLETE CBC AUTOMATED: CPT

## 2023-07-16 PROCEDURE — 96374 THER/PROPH/DIAG INJ IV PUSH: CPT

## 2023-07-16 PROCEDURE — 83690 ASSAY OF LIPASE: CPT

## 2023-07-16 PROCEDURE — 80076 HEPATIC FUNCTION PANEL: CPT

## 2023-07-16 PROCEDURE — 74177 CT ABD & PELVIS W/CONTRAST: CPT

## 2023-07-16 PROCEDURE — 99285 EMERGENCY DEPT VISIT HI MDM: CPT

## 2023-07-16 PROCEDURE — 6360000002 HC RX W HCPCS: Performed by: PHYSICIAN ASSISTANT

## 2023-07-16 RX ORDER — 0.9 % SODIUM CHLORIDE 0.9 %
1000 INTRAVENOUS SOLUTION INTRAVENOUS ONCE
Status: COMPLETED | OUTPATIENT
Start: 2023-07-16 | End: 2023-07-17

## 2023-07-16 RX ORDER — SODIUM CHLORIDE 0.9 % (FLUSH) 0.9 %
SYRINGE (ML) INJECTION
Status: DISCONTINUED
Start: 2023-07-16 | End: 2023-07-16 | Stop reason: WASHOUT

## 2023-07-16 RX ORDER — PANTOPRAZOLE SODIUM 40 MG/1
40 TABLET, DELAYED RELEASE ORAL DAILY
Qty: 10 TABLET | Refills: 0 | Status: SHIPPED | OUTPATIENT
Start: 2023-07-16 | End: 2023-07-26

## 2023-07-16 RX ORDER — IBUPROFEN 800 MG/1
800 TABLET ORAL EVERY 6 HOURS PRN
Qty: 20 TABLET | Refills: 0 | Status: SHIPPED | OUTPATIENT
Start: 2023-07-16 | End: 2023-07-21

## 2023-07-16 RX ORDER — ONDANSETRON 2 MG/ML
4 INJECTION INTRAMUSCULAR; INTRAVENOUS ONCE
Status: COMPLETED | OUTPATIENT
Start: 2023-07-16 | End: 2023-07-16

## 2023-07-16 RX ORDER — ONDANSETRON 4 MG/1
4 TABLET, FILM COATED ORAL EVERY 8 HOURS PRN
Qty: 12 TABLET | Refills: 0 | Status: SHIPPED | OUTPATIENT
Start: 2023-07-16 | End: 2023-07-21

## 2023-07-16 RX ORDER — DOXYCYCLINE HYCLATE 100 MG
100 TABLET ORAL 2 TIMES DAILY
Qty: 14 TABLET | Refills: 0 | Status: SHIPPED | OUTPATIENT
Start: 2023-07-16 | End: 2023-07-23

## 2023-07-16 RX ORDER — ACETAMINOPHEN 500 MG
1000 TABLET ORAL ONCE
Status: COMPLETED | OUTPATIENT
Start: 2023-07-16 | End: 2023-07-16

## 2023-07-16 RX ADMIN — IOPAMIDOL 75 ML: 755 INJECTION, SOLUTION INTRAVENOUS at 22:27

## 2023-07-16 RX ADMIN — ACETAMINOPHEN 1000 MG: 500 TABLET ORAL at 21:07

## 2023-07-16 RX ADMIN — ONDANSETRON 4 MG: 2 INJECTION INTRAMUSCULAR; INTRAVENOUS at 21:09

## 2023-07-16 RX ADMIN — SODIUM CHLORIDE 1000 ML: 9 INJECTION, SOLUTION INTRAVENOUS at 21:11

## 2023-07-16 ASSESSMENT — PAIN - FUNCTIONAL ASSESSMENT: PAIN_FUNCTIONAL_ASSESSMENT: NONE - DENIES PAIN

## 2023-07-16 ASSESSMENT — PAIN DESCRIPTION - LOCATION: LOCATION: HEAD

## 2023-07-16 ASSESSMENT — PAIN DESCRIPTION - DESCRIPTORS: DESCRIPTORS: ACHING

## 2023-07-17 VITALS
DIASTOLIC BLOOD PRESSURE: 71 MMHG | BODY MASS INDEX: 24.88 KG/M2 | TEMPERATURE: 98.3 F | SYSTOLIC BLOOD PRESSURE: 100 MMHG | RESPIRATION RATE: 16 BRPM | HEIGHT: 69 IN | WEIGHT: 168 LBS | OXYGEN SATURATION: 97 % | HEART RATE: 70 BPM

## 2023-07-17 NOTE — ED PROVIDER NOTES
Independent ABNER Visit. [facility]   ED  Encounter Note  Admit Date/RoomTime: 2023  8:16 PM  ED Room: 33/33    NAME: Heidy Hickey  : 1980  MRN: [id number]     Chief Complaint:  Insect Bite (In between toes of left foot noticed 3 days ago, c/o pain and itching) and Fever (Fever and chills x last couple days )    History of Present Illness        Heidy Hickey is a 38 y.o. old male who presents to the emergency department by private vehicle, for sudden onset of fever, which began a couple days prior to arrival.  The fever is described as: fevers up to 102.4 degrees and measured orally. Since onset the symptoms have been waxing and waning. His symptoms are associated with malaise, muscle aches, nausea, and epigastric pain, \"insect bite\" between 3rd and 4th\" toes which is red and itchy. There has been NO history of chest pain, cough, dysuria, headache, joint swelling, nasal congestion, neck stiffness, sneezing, sore throat, swollen glands, or diarrhea. He reports he has not eaten much over the past 3 days because of not feeling well, no BM today. He has been treated with  naprosyn  prior to arrival. Pt reports he was hiking all day today. He was diagnosed with kidney stones in early may, none in ureter at that time. He had MB RFA of lumbar 3/4/5 6/15/23. He has had no back pain with this illness. Denies drug, alcohol use, or tobacco use. .  ROS   Pertinent positives and negatives are stated within HPI, all other systems reviewed and are negative. Past Medical History:  has a past medical history of Chronic back pain, Kidney stone, Low back pain, and Neck pain. Surgical History:  has a past surgical history that includes Dental surgery; Nerve Block (Right, 2020); Anesthesia Nerve Block (Right, 2020); Nerve Block (Right, 2020); Anesthesia Nerve Block (Right, 2020); Pain management procedure (Right, 2020);  Nerve

## 2023-07-19 ENCOUNTER — OFFICE VISIT (OUTPATIENT)
Dept: PRIMARY CARE CLINIC | Age: 43
End: 2023-07-19
Payer: MEDICAID

## 2023-07-19 ENCOUNTER — TELEPHONE (OUTPATIENT)
Dept: PRIMARY CARE CLINIC | Age: 43
End: 2023-07-19

## 2023-07-19 VITALS
HEIGHT: 69 IN | HEART RATE: 64 BPM | BODY MASS INDEX: 25.74 KG/M2 | OXYGEN SATURATION: 97 % | DIASTOLIC BLOOD PRESSURE: 62 MMHG | RESPIRATION RATE: 17 BRPM | WEIGHT: 173.8 LBS | SYSTOLIC BLOOD PRESSURE: 100 MMHG | TEMPERATURE: 97.5 F

## 2023-07-19 DIAGNOSIS — L03.116 CELLULITIS OF LEFT LOWER EXTREMITY: Primary | ICD-10-CM

## 2023-07-19 DIAGNOSIS — R82.2 BILIRUBINURIA: ICD-10-CM

## 2023-07-19 DIAGNOSIS — L03.116 CELLULITIS OF LEFT LOWER EXTREMITY: ICD-10-CM

## 2023-07-19 LAB
C-REACTIVE PROTEIN: 89 MG/L (ref 0–5)
HCT VFR BLD CALC: 42.3 % (ref 37–54)
HEMOGLOBIN: 14.8 G/DL (ref 12.5–16.5)
MCH RBC QN AUTO: 32.2 PG (ref 26–35)
MCHC RBC AUTO-ENTMCNC: 35 G/DL (ref 32–34.5)
MCV RBC AUTO: 92.2 FL (ref 80–99.9)
PDW BLD-RTO: 13.3 % (ref 11.5–15)
PLATELET # BLD: 110 K/UL (ref 130–450)
PMV BLD AUTO: 12.8 FL (ref 7–12)
PROSTATE SPECIFIC ANTIGEN: 1.33 NG/ML (ref 0–4)
RBC # BLD: 4.59 M/UL (ref 3.8–5.8)
WBC # BLD: 4.1 K/UL (ref 4.5–11.5)

## 2023-07-19 PROCEDURE — 90715 TDAP VACCINE 7 YRS/> IM: CPT | Performed by: STUDENT IN AN ORGANIZED HEALTH CARE EDUCATION/TRAINING PROGRAM

## 2023-07-19 PROCEDURE — G8427 DOCREV CUR MEDS BY ELIG CLIN: HCPCS | Performed by: STUDENT IN AN ORGANIZED HEALTH CARE EDUCATION/TRAINING PROGRAM

## 2023-07-19 PROCEDURE — 1036F TOBACCO NON-USER: CPT | Performed by: STUDENT IN AN ORGANIZED HEALTH CARE EDUCATION/TRAINING PROGRAM

## 2023-07-19 PROCEDURE — 99214 OFFICE O/P EST MOD 30 MIN: CPT | Performed by: STUDENT IN AN ORGANIZED HEALTH CARE EDUCATION/TRAINING PROGRAM

## 2023-07-19 PROCEDURE — 90471 IMMUNIZATION ADMIN: CPT | Performed by: STUDENT IN AN ORGANIZED HEALTH CARE EDUCATION/TRAINING PROGRAM

## 2023-07-19 PROCEDURE — G8419 CALC BMI OUT NRM PARAM NOF/U: HCPCS | Performed by: STUDENT IN AN ORGANIZED HEALTH CARE EDUCATION/TRAINING PROGRAM

## 2023-07-19 RX ORDER — CEPHALEXIN 500 MG/1
500 CAPSULE ORAL 3 TIMES DAILY
Qty: 21 CAPSULE | Refills: 0 | Status: SHIPPED
Start: 2023-07-19 | End: 2023-07-19

## 2023-07-19 RX ORDER — METRONIDAZOLE 500 MG/1
500 TABLET ORAL 3 TIMES DAILY
Qty: 30 TABLET | Refills: 0 | Status: SHIPPED | OUTPATIENT
Start: 2023-07-19 | End: 2023-07-29

## 2023-07-19 NOTE — TELEPHONE ENCOUNTER
Pt was seen in ED 07.16.2023 for Insect Bite (In between toes of left foot noticed 3 days ago, c/o pain and itching) and Fever (Fever and chills x last couple days )    Tried to schedule him an ED f/u but next available is 07.25.2023 and pt is requesting to be seen sooner; if cannot be seen sooner, what would Dr Bar Sandhu advise.  Pt states his foot is painful and itchy

## 2023-07-20 ENCOUNTER — HOSPITAL ENCOUNTER (OUTPATIENT)
Dept: GENERAL RADIOLOGY | Age: 43
Discharge: HOME | End: 2023-07-22
Payer: MEDICAID

## 2023-07-20 ENCOUNTER — HOSPITAL ENCOUNTER (OUTPATIENT)
Age: 43
Discharge: HOME | End: 2023-07-22
Payer: MEDICAID

## 2023-07-20 DIAGNOSIS — L03.116 CELLULITIS OF LEFT LOWER EXTREMITY: ICD-10-CM

## 2023-07-20 LAB — SEDIMENTATION RATE, ERYTHROCYTE: 8 MM/HR (ref 0–15)

## 2023-07-20 PROCEDURE — 73630 X-RAY EXAM OF FOOT: CPT

## 2023-07-21 ENCOUNTER — TELEPHONE (OUTPATIENT)
Dept: PRIMARY CARE CLINIC | Age: 43
End: 2023-07-21

## 2023-07-24 ENCOUNTER — OFFICE VISIT (OUTPATIENT)
Dept: PRIMARY CARE CLINIC | Age: 43
End: 2023-07-24
Payer: MEDICAID

## 2023-07-24 VITALS
WEIGHT: 173 LBS | HEART RATE: 50 BPM | TEMPERATURE: 97.5 F | RESPIRATION RATE: 17 BRPM | BODY MASS INDEX: 25.62 KG/M2 | OXYGEN SATURATION: 98 % | HEIGHT: 69 IN | SYSTOLIC BLOOD PRESSURE: 100 MMHG | DIASTOLIC BLOOD PRESSURE: 70 MMHG

## 2023-07-24 DIAGNOSIS — L03.116 CELLULITIS OF LEFT LOWER EXTREMITY: Primary | ICD-10-CM

## 2023-07-24 DIAGNOSIS — L03.116 CELLULITIS OF LEFT LOWER EXTREMITY: ICD-10-CM

## 2023-07-24 LAB
C-REACTIVE PROTEIN: 7 MG/L (ref 0–5)
HCT VFR BLD CALC: 41.1 % (ref 37–54)
HEMOGLOBIN: 13.9 G/DL (ref 12.5–16.5)
MCH RBC QN AUTO: 32.4 PG (ref 26–35)
MCHC RBC AUTO-ENTMCNC: 33.8 G/DL (ref 32–34.5)
MCV RBC AUTO: 95.8 FL (ref 80–99.9)
PDW BLD-RTO: 13.8 % (ref 11.5–15)
PLATELET CONFIRMATION: NORMAL
PLATELET, FLUORESCENCE: 103 K/UL (ref 130–450)
PMV BLD AUTO: 12 FL (ref 7–12)
RBC # BLD: 4.29 M/UL (ref 3.8–5.8)
SEDIMENTATION RATE, ERYTHROCYTE: 4 MM/HR (ref 0–15)
WBC # BLD: 4.9 K/UL (ref 4.5–11.5)

## 2023-07-24 PROCEDURE — 99213 OFFICE O/P EST LOW 20 MIN: CPT | Performed by: STUDENT IN AN ORGANIZED HEALTH CARE EDUCATION/TRAINING PROGRAM

## 2023-07-24 PROCEDURE — 1036F TOBACCO NON-USER: CPT | Performed by: STUDENT IN AN ORGANIZED HEALTH CARE EDUCATION/TRAINING PROGRAM

## 2023-07-24 PROCEDURE — G8419 CALC BMI OUT NRM PARAM NOF/U: HCPCS | Performed by: STUDENT IN AN ORGANIZED HEALTH CARE EDUCATION/TRAINING PROGRAM

## 2023-07-24 PROCEDURE — G8427 DOCREV CUR MEDS BY ELIG CLIN: HCPCS | Performed by: STUDENT IN AN ORGANIZED HEALTH CARE EDUCATION/TRAINING PROGRAM

## 2023-07-24 NOTE — PROGRESS NOTES
[facility]  Department of Family Medicine      Patient:  Dylan Arreguin 38 y.o. male     Date of Service: 7/24/23      Chief complaint:   Chief Complaint   Patient presents with    Cellulitis         History ofPresent Illness   The patient is a 38 y.o. male  presented to the clinic with complaints as above.     Cellulitis  -f/u  -labs showed elevated CRP, foot imaging showed nothing specific  -currently, is feeling better and his foot feels great, pain completely resolved  -denies any fever or chills or night sweats     Past Medical History:      Diagnosis Date    Chronic back pain     Kidney stone     Low back pain     Neck pain        PastSurgical History:        Procedure Laterality Date    ANESTHESIA NERVE BLOCK Right 1/13/2020    RIGHT L3,4 MEDIAL BRANCH AND DORSAL RAMUS BLOCK WITHOUT SEDATION (CPT 62573,17209) performed by Joyce Kinney MD at [address] [facility] Right 2/20/2020    RIGHT L3,4 MEDIAL BRANCH AND L5 DORSAL RAMUS BLOCK (CPT 14477,02906) performed by Joyce Kinney MD at [address] Right 01/13/2020    mbb and dorsal rami    NERVE BLOCK Right 02/20/2020    medial branch block    NERVE BLOCK Right 09/03/2020    NERVE BLOCK Right 9/3/2020    RIGHT C3,4,5 MEDIAL BRANCH BLOCK (CPT 93059,91466) performed by Joyce Kinney MD at [facility] Left 03/08/2021    Left L3, 4 Medial branch and L5 dorsal ramus block    NERVE BLOCK Left 3/8/2021    LEFT L3,4 MEDIAL BRANCH AND L5 DORSAL RAMUS BLOCK (CPT [id number]) performed by Joyce Kinney MD at [address] Right 6/11/2020    RIGHT L3, 4 MEDIAL BRANCH AND L5 DORSAL RAMUS RADIOFREQUENCY ABLATION performed by Joyce Kinney MD at [address] Right 6/14/2021    RIGHT L3, 4, 5 MEDIAL BRANCH  RADIOFREQUENCY ABLATION (CPT 93861)(WANTS LAST CASE) performed by Joyce Kinney MD at [address] [place] [address]

## 2023-12-06 ENCOUNTER — OFFICE VISIT (OUTPATIENT)
Dept: PAIN MANAGEMENT | Age: 43
End: 2023-12-06
Payer: MEDICAID

## 2023-12-06 VITALS
RESPIRATION RATE: 18 BRPM | HEART RATE: 78 BPM | WEIGHT: 173 LBS | BODY MASS INDEX: 25.62 KG/M2 | SYSTOLIC BLOOD PRESSURE: 106 MMHG | HEIGHT: 69 IN | TEMPERATURE: 97.1 F | OXYGEN SATURATION: 96 % | DIASTOLIC BLOOD PRESSURE: 68 MMHG

## 2023-12-06 DIAGNOSIS — M47.812 CERVICAL FACET JOINT SYNDROME: ICD-10-CM

## 2023-12-06 DIAGNOSIS — M54.12 CERVICAL RADICULOPATHY: ICD-10-CM

## 2023-12-06 DIAGNOSIS — G89.29 CHRONIC PAIN OF LEFT KNEE: ICD-10-CM

## 2023-12-06 DIAGNOSIS — M43.16 SPONDYLOLISTHESIS AT L4-L5 LEVEL: ICD-10-CM

## 2023-12-06 DIAGNOSIS — M43.16 SPONDYLOLISTHESIS OF LUMBAR REGION: ICD-10-CM

## 2023-12-06 DIAGNOSIS — G89.29 CHRONIC RIGHT SHOULDER PAIN: ICD-10-CM

## 2023-12-06 DIAGNOSIS — M47.812 CERVICAL SPONDYLOSIS: ICD-10-CM

## 2023-12-06 DIAGNOSIS — M47.816 LUMBAR SPONDYLOSIS: ICD-10-CM

## 2023-12-06 DIAGNOSIS — M51.9 LUMBAR DISC DISORDER: ICD-10-CM

## 2023-12-06 DIAGNOSIS — G89.4 CHRONIC PAIN SYNDROME: Primary | ICD-10-CM

## 2023-12-06 DIAGNOSIS — M19.011 ARTHRITIS OF RIGHT ACROMIOCLAVICULAR JOINT: ICD-10-CM

## 2023-12-06 DIAGNOSIS — M25.511 CHRONIC RIGHT SHOULDER PAIN: ICD-10-CM

## 2023-12-06 DIAGNOSIS — M25.562 CHRONIC PAIN OF LEFT KNEE: ICD-10-CM

## 2023-12-06 DIAGNOSIS — M47.816 LUMBAR FACET ARTHROPATHY: ICD-10-CM

## 2023-12-06 PROCEDURE — G8419 CALC BMI OUT NRM PARAM NOF/U: HCPCS | Performed by: PAIN MEDICINE

## 2023-12-06 PROCEDURE — 1036F TOBACCO NON-USER: CPT | Performed by: PAIN MEDICINE

## 2023-12-06 PROCEDURE — G8484 FLU IMMUNIZE NO ADMIN: HCPCS | Performed by: PAIN MEDICINE

## 2023-12-06 PROCEDURE — 99213 OFFICE O/P EST LOW 20 MIN: CPT | Performed by: PAIN MEDICINE

## 2023-12-06 PROCEDURE — G8427 DOCREV CUR MEDS BY ELIG CLIN: HCPCS | Performed by: PAIN MEDICINE

## 2023-12-06 NOTE — PROGRESS NOTES
[address], [facility], [address] [place] [address]  [phone]    Follow up Note      Dorothy Escobar     Date of Visit:  12/6/2023    CC:  Patient presents for follow up   Chief Complaint   Patient presents with    Follow-up     Bilateral L3,4,5 medial branch radiofrequency ablation  6/15/23     HPI:  Follow up on his low back pain with no acute issues. Appropriate analgesia with current medications regimen: N/A    Change in quality of symptoms:no. Medication side effects:none. Recent diagnostic testing:none  Recent interventional procedures:Bilateral L3,4,5 MB RFA 06/2023 with excellent relief. He has not been on anticoagulation medications to include none. The patient  has not been on herbal supplements. The patient is not diabetic. Imaging:   Cervical spine MRI 2020  Findings compatible with degenerative changes     Cervical spine Xray 2020  Findings consistent with moderate degenerative disc disease of C5-C6   and mild degenerative facet disease. Lumbar spine MRI 10/2019   Changes within the facets are abnormal. Findings are compatible with   degenerative facet disease. At L5-S1: There is a mild broad-based disc herniation, there is mild   canal stenosis       At L4-5: There is a mild broad-based disc herniation, there is mild   canal stenosis, slightly more prominent towards the left extending   into the left neural foramina       At L3-4: There is no significant disc herniation, there is no   significant canal stenosis. At L2-3: There is no significant disc herniation, there is no   significant canal stenosis. At L1-2: There is no significant disc herniation, there is no   significant canal stenosis. Lumbar spine flexsion/extension 08/2018  L4 spondylolysis with grade 1 spondylolisthesis. Approximately 4 mm translation on flexion-extension views. Previous treatments: Physical Therapy and medications. .       Potential

## 2024-06-05 ENCOUNTER — OFFICE VISIT (OUTPATIENT)
Dept: PAIN MANAGEMENT | Age: 44
End: 2024-06-05
Payer: MEDICAID

## 2024-06-05 VITALS
WEIGHT: 173 LBS | OXYGEN SATURATION: 98 % | TEMPERATURE: 96.9 F | SYSTOLIC BLOOD PRESSURE: 100 MMHG | RESPIRATION RATE: 18 BRPM | BODY MASS INDEX: 25.62 KG/M2 | HEART RATE: 59 BPM | HEIGHT: 69 IN | DIASTOLIC BLOOD PRESSURE: 60 MMHG

## 2024-06-05 DIAGNOSIS — M51.9 LUMBAR DISC DISORDER: ICD-10-CM

## 2024-06-05 DIAGNOSIS — M43.16 SPONDYLOLISTHESIS OF LUMBAR REGION: ICD-10-CM

## 2024-06-05 DIAGNOSIS — M47.816 LUMBAR FACET ARTHROPATHY: ICD-10-CM

## 2024-06-05 DIAGNOSIS — M25.562 CHRONIC PAIN OF LEFT KNEE: ICD-10-CM

## 2024-06-05 DIAGNOSIS — G89.29 CHRONIC PAIN OF LEFT KNEE: ICD-10-CM

## 2024-06-05 DIAGNOSIS — G89.4 CHRONIC PAIN SYNDROME: Primary | ICD-10-CM

## 2024-06-05 DIAGNOSIS — M47.816 LUMBAR SPONDYLOSIS: ICD-10-CM

## 2024-06-05 PROCEDURE — 1036F TOBACCO NON-USER: CPT | Performed by: PAIN MEDICINE

## 2024-06-05 PROCEDURE — G8419 CALC BMI OUT NRM PARAM NOF/U: HCPCS | Performed by: PAIN MEDICINE

## 2024-06-05 PROCEDURE — 99214 OFFICE O/P EST MOD 30 MIN: CPT | Performed by: PAIN MEDICINE

## 2024-06-05 PROCEDURE — 99213 OFFICE O/P EST LOW 20 MIN: CPT | Performed by: PAIN MEDICINE

## 2024-06-05 PROCEDURE — G8427 DOCREV CUR MEDS BY ELIG CLIN: HCPCS | Performed by: PAIN MEDICINE

## 2024-06-05 RX ORDER — SODIUM CHLORIDE 0.9 % (FLUSH) 0.9 %
5-40 SYRINGE (ML) INJECTION PRN
OUTPATIENT
Start: 2024-06-05

## 2024-06-05 RX ORDER — SODIUM CHLORIDE 9 MG/ML
INJECTION, SOLUTION INTRAVENOUS PRN
OUTPATIENT
Start: 2024-06-05

## 2024-06-05 RX ORDER — SODIUM CHLORIDE 0.9 % (FLUSH) 0.9 %
5-40 SYRINGE (ML) INJECTION EVERY 12 HOURS SCHEDULED
OUTPATIENT
Start: 2024-06-05

## 2024-06-05 NOTE — PROGRESS NOTES
[facility]  [address], [place] [address]  [phone]    Follow up Note      Michelet Barr     Date of Visit:  6/5/2024    CC:  Patient presents for follow up   Chief Complaint   Patient presents with    Lower Back Pain     HPI:  Worsening low back pain with no radicular symptoms, last seen 12/2023.  Pain is described as sharp/constant and is aggravated by movement.  Appropriate analgesia with current medications regimen: N/A    Change in quality of symptoms:no.    Medication side effects:none.   Recent diagnostic testing:none  Recent interventional procedures:none     He has not been on anticoagulation medications to include none. The patient  has not been on herbal supplements.  The patient is not diabetic.     Imaging:   Cervical spine MRI 2020  Findings compatible with degenerative changes     Cervical spine Xray 2020  Findings consistent with moderate degenerative disc disease of C5-C6   and mild degenerative facet disease.      Lumbar spine MRI 10/2019   Changes within the facets are abnormal. Findings are compatible with   degenerative facet disease.           At L5-S1: There is a mild broad-based disc herniation, there is mild   canal stenosis       At L4-5: There is a mild broad-based disc herniation, there is mild   canal stenosis, slightly more prominent towards the left extending   into the left neural foramina       At L3-4: There is no significant disc herniation, there is no   significant canal stenosis.       At L2-3: There is no significant disc herniation, there is no   significant canal stenosis.       At L1-2: There is no significant disc herniation, there is no   significant canal stenosis.      Lumbar spine flexsion/extension 08/2018  L4 spondylolysis with grade 1 spondylolisthesis.   Approximately 4 mm translation on flexion-extension views.      Previous treatments: Physical Therapy and medications..       Potential Aberrant Drug-Related

## 2024-06-05 NOTE — PROGRESS NOTES
Michelet Barr presents to the [facility] on 6/5/2024. Michelet is complaining of pain in his lower back. The pain is constant. The pain is described as stabbing and sharp. Pain is rated on his best day at a 1, on his worst day at a 7, and on average at a 2 on the VAS scale. He took his last dose of  None  .      Any procedures since your last visit: No    He is not on NSAIDS and  is not on anticoagulation medications to include none and is managed by NA.     Pacemaker or defibrillator: No     Medication Contract and Consent for Opioid Use Documents Filed        No documents found                       Resp 18   Ht 1.753 m (5' 9.02\")   Wt 78.5 kg (173 lb)   BMI 25.54 kg/m²      No LMP for male patient.

## 2024-06-10 ENCOUNTER — TELEPHONE (OUTPATIENT)
Dept: PAIN MANAGEMENT | Age: 44
End: 2024-06-10

## 2024-06-10 DIAGNOSIS — M47.816 LUMBAR SPONDYLOSIS: Primary | ICD-10-CM

## 2024-06-10 NOTE — TELEPHONE ENCOUNTER
Call to Michelet Barr that procedure was scheduled for 6/17/2024 and that [facility] should call him a few days before for the pre op call and after 3:00 PM the business day before with the arrival time. Michelet verbalized understanding. Michelet reports that he is going to be self pay and then apply for financial assistance    Electronically signed by Trevor Marti RN on 6/10/2024 at 1:34 PM

## 2024-06-17 ENCOUNTER — HOSPITAL ENCOUNTER (OUTPATIENT)
Dept: OPERATING ROOM | Age: 44
Setting detail: OUTPATIENT SURGERY
Discharge: HOME | End: 2024-06-17
Attending: PAIN MEDICINE
Payer: MEDICAID

## 2024-06-17 ENCOUNTER — HOSPITAL ENCOUNTER (OUTPATIENT)
Age: 44
Setting detail: OUTPATIENT SURGERY
Discharge: HOME | End: 2024-06-17
Attending: PAIN MEDICINE | Admitting: PAIN MEDICINE
Payer: MEDICAID

## 2024-06-17 VITALS
HEART RATE: 67 BPM | TEMPERATURE: 97.4 F | DIASTOLIC BLOOD PRESSURE: 70 MMHG | OXYGEN SATURATION: 100 % | WEIGHT: 173 LBS | RESPIRATION RATE: 16 BRPM | BODY MASS INDEX: 25.62 KG/M2 | HEIGHT: 69 IN | SYSTOLIC BLOOD PRESSURE: 118 MMHG

## 2024-06-17 DIAGNOSIS — M47.816 LUMBAR SPONDYLOSIS: ICD-10-CM

## 2024-06-17 PROCEDURE — 64636 DESTROY L/S FACET JNT ADDL: CPT | Performed by: PAIN MEDICINE

## 2024-06-17 PROCEDURE — 2500000003 HC RX 250 WO HCPCS: Performed by: PAIN MEDICINE

## 2024-06-17 PROCEDURE — 6360000002 HC RX W HCPCS: Performed by: PAIN MEDICINE

## 2024-06-17 PROCEDURE — 7100000010 HC PHASE II RECOVERY - FIRST 15 MIN: Performed by: PAIN MEDICINE

## 2024-06-17 PROCEDURE — 2709999900 HC NON-CHARGEABLE SUPPLY: Performed by: PAIN MEDICINE

## 2024-06-17 PROCEDURE — 64635 DESTROY LUMB/SAC FACET JNT: CPT | Performed by: PAIN MEDICINE

## 2024-06-17 PROCEDURE — 3600000015 HC SURGERY LEVEL 5 ADDTL 15MIN: Performed by: PAIN MEDICINE

## 2024-06-17 PROCEDURE — 7100000011 HC PHASE II RECOVERY - ADDTL 15 MIN: Performed by: PAIN MEDICINE

## 2024-06-17 PROCEDURE — 3600000005 HC SURGERY LEVEL 5 BASE: Performed by: PAIN MEDICINE

## 2024-06-17 RX ORDER — SODIUM CHLORIDE 0.9 % (FLUSH) 0.9 %
5-40 SYRINGE (ML) INJECTION EVERY 12 HOURS SCHEDULED
Status: DISCONTINUED | OUTPATIENT
Start: 2024-06-17 | End: 2024-06-17 | Stop reason: HOSPADM

## 2024-06-17 RX ORDER — LIDOCAINE HYDROCHLORIDE 20 MG/ML
INJECTION, SOLUTION EPIDURAL; INFILTRATION; INTRACAUDAL; PERINEURAL PRN
Status: DISCONTINUED | OUTPATIENT
Start: 2024-06-17 | End: 2024-06-17 | Stop reason: ALTCHOICE

## 2024-06-17 RX ORDER — SODIUM CHLORIDE 9 MG/ML
INJECTION, SOLUTION INTRAVENOUS PRN
Status: DISCONTINUED | OUTPATIENT
Start: 2024-06-17 | End: 2024-06-17 | Stop reason: HOSPADM

## 2024-06-17 RX ORDER — SODIUM CHLORIDE 0.9 % (FLUSH) 0.9 %
5-40 SYRINGE (ML) INJECTION PRN
Status: DISCONTINUED | OUTPATIENT
Start: 2024-06-17 | End: 2024-06-17 | Stop reason: HOSPADM

## 2024-06-17 ASSESSMENT — PAIN - FUNCTIONAL ASSESSMENT
PAIN_FUNCTIONAL_ASSESSMENT: 0-10

## 2024-06-17 NOTE — DISCHARGE INSTRUCTIONS
pain, swelling, warmth, or redness in the area.  Red streaks leading from the area.  Pus draining from the wound.  A new or higher fever.   Watch closely for changes in your health, and be sure to contact your doctor if you have any problems.  Where can you learn more?  Go to https://www.[employer].net/patientEd and enter C340 to learn more about \"Infection After Surgery: Care Instructions.\"  Current as of: July 26, 2023  Content Version: 14.1  © 2006-2024 [employer].   Care instructions adapted under license by [employer]. If you have questions about a medical condition or this instruction, always ask your healthcare professional. [employer] disclaims any warranty or liability for your use of this information.

## 2024-06-17 NOTE — H&P
(5/11/2023)    Housing Stability Vital Sign     Unable to Pay for Housing in the Last Year: Not on file     Number of Places Lived in the Last Year: Not on file     Unstable Housing in the Last Year: No       Family History   Problem Relation Age of Onset    Heart Disease Mother     Diabetes Mother          REVIEW OF SYSTEMS:    CONSTITUTIONAL:  negative for  fevers, chills, sweats and fatigue    RESPIRATORY:  negative for  dry cough, cough with sputum, dyspnea, wheezing and chest pain    CARDIOVASCULAR:  negative for chest pain, dyspnea, palpitations, syncope    GASTROINTESTINAL:  negative for nausea, vomiting, change in bowel habits, diarrhea, constipation and abdominal pain    MUSCULOSKELETAL: negative for muscle weakness    SKIN: negative for itching or rashes.    BEHAVIOR/PSYCH:  negative for poor appetite, increased appetite, decreased sleep and poor concentration    All other systems negative      PHYSICAL EXAM:    VITALS:  /73   Pulse 60   Temp 97.4 °F (36.3 °C) (Tympanic)   Resp 16   Ht 1.753 m (5' 9\")   Wt 78.5 kg (173 lb)   SpO2 97%   BMI 25.55 kg/m²     CONSTITUTIONAL:  awake, alert, cooperative, no apparent distress, and appears stated age    EYES: PERRLA, EOMI    LUNGS:  No increased work of breathing, no audible wheezing    CARDIOVASCULAR:  regular rate and rhythm    ABDOMEN:  Soft non tender non distended     EXTREMITIES: no signs of clubbing or cyanosis.    MUSCULOSKELETAL: negative for flaccid muscle tone or spastic movements.    SKIN: gross examination reveals no signs of rashes, or diaphoresis.    NEURO: Cranial nerves II-XII grossly intact. No signs of agitated mood.       Assessment/Plan:    Axial low back pain for bilateral L3,4,5 MB RFA.

## 2024-06-17 NOTE — OP NOTE
2024    Patient: Michelet Barr  :  1980  Age:  43 y.o.  Sex:  male     PRE-OPERATIVE DIAGNOSIS: Bilateral Lumbar spondylosis, lumbar facet arthropathy.    POST-OPERATIVE DIAGNOSIS: Same.    PROCEDURE:  Fluoroscopic-guided Bilateral  Lumbar facet medial branch radiofrequency ablation at levels L3,4,5 MB.    SURGEON:  CHACE Davis    ANESTHESIA: Local    ESTIMATED BLOOD LOSS: None.  ______________________________________________________________________  HISTORY & PHYSICAL: Michelet Barr presents today for fluoroscopic-guided Bilateral lumbar facet medial branch radiofrequency ablation at levels L3,4,5 MB. The potential complications of the procedure were explained to Michelet again today and he has elected to undergo the aforementioned procedure.    Michelet’s complete History & Physical examination were reviewed in depth, a copy of which is in the chart.      DESCRIPTION OF PROCEDURE:    After confirming written and informed consent, a time-out was performed and Michelet’s name and date of birth, the procedure to be performed as well as the plan for the location of the needle insertion were confirmed.    The patient was brought into the procedure room and placed in the prone position on the fluoroscopy table. Standard monitors were placed and vital signs were observed throughout the procedure. The area of the lumbar spine and upper buttocks was sterilely prepped with chloraprep and draped in a sterile manner. AP fluoroscopy was used to identify and frank bartons point at the targeted area. A 22 gauge 10 mm radiofrequency probe was advanced toward each of these points. Once bone was contacted, negative aspiration for blood and CSF was confirmed, sensory stimulation was performed at 50 Hz and at 0.4 volts generating a pressure sensation. Motor stimulation < 2.0 volts elicited multifidus twitching without any radicular symptoms. 1 ml of 2% lidocaine was injected prior to lesioning, which was performed for 90 seconds

## 2025-02-14 ENCOUNTER — OFFICE VISIT (OUTPATIENT)
Dept: PRIMARY CARE CLINIC | Age: 45
End: 2025-02-14

## 2025-02-14 VITALS
OXYGEN SATURATION: 98 % | HEIGHT: 69 IN | WEIGHT: 170 LBS | BODY MASS INDEX: 25.18 KG/M2 | TEMPERATURE: 97 F | RESPIRATION RATE: 16 BRPM | SYSTOLIC BLOOD PRESSURE: 110 MMHG | HEART RATE: 70 BPM | DIASTOLIC BLOOD PRESSURE: 70 MMHG

## 2025-02-14 DIAGNOSIS — K21.9 GASTROESOPHAGEAL REFLUX DISEASE, UNSPECIFIED WHETHER ESOPHAGITIS PRESENT: Primary | ICD-10-CM

## 2025-02-14 SDOH — ECONOMIC STABILITY: FOOD INSECURITY: WITHIN THE PAST 12 MONTHS, THE FOOD YOU BOUGHT JUST DIDN'T LAST AND YOU DIDN'T HAVE MONEY TO GET MORE.: NEVER TRUE

## 2025-02-14 SDOH — ECONOMIC STABILITY: FOOD INSECURITY: WITHIN THE PAST 12 MONTHS, YOU WORRIED THAT YOUR FOOD WOULD RUN OUT BEFORE YOU GOT MONEY TO BUY MORE.: NEVER TRUE

## 2025-02-14 ASSESSMENT — PATIENT HEALTH QUESTIONNAIRE - PHQ9
1. LITTLE INTEREST OR PLEASURE IN DOING THINGS: SEVERAL DAYS
SUM OF ALL RESPONSES TO PHQ QUESTIONS 1-9: 1
2. FEELING DOWN, DEPRESSED OR HOPELESS: NOT AT ALL
SUM OF ALL RESPONSES TO PHQ9 QUESTIONS 1 & 2: 1
SUM OF ALL RESPONSES TO PHQ QUESTIONS 1-9: 1

## 2025-02-14 NOTE — PROGRESS NOTES
[facility]  Department of Family Medicine      Patient:  Michelet Barr 44 y.o. male     Date of Service: 2/14/25      Chief complaint:   Chief Complaint   Patient presents with    Abdominal Pain         History ofPresent Illness   The patient is a 44 y.o. male  presented to the clinic with complaints as above.    GERD  -f/u  -was worsening in last month, states it was more about what he was drinking and eating  -since changing his diet recently now it is improving   -having nausea however improving with diet   -was having epigastric burning pain   -taking ibuprofen PRN, tries tto take as least as possible  -not taking protonix anymore       Past Medical History:      Diagnosis Date    Chronic back pain     Kidney stone     Low back pain     Neck pain     Urinary frequency        PastSurgical History:        Procedure Laterality Date    ANESTHESIA NERVE BLOCK Right 1/13/2020    RIGHT L3,4 MEDIAL BRANCH AND DORSAL RAMUS BLOCK WITHOUT SEDATION (CPT 57560,27543) performed by Josiah Vazquez MD at [facility] OR    ANESTHESIA NERVE BLOCK Right 2/20/2020    RIGHT L3,4 MEDIAL BRANCH AND L5 DORSAL RAMUS BLOCK (CPT 48702,91158) performed by Josiah Vazquez MD at [facility] OR    DENTAL SURGERY      NERVE BLOCK Right 01/13/2020    mbb and dorsal rami    NERVE BLOCK Right 02/20/2020    medial branch block    NERVE BLOCK Right 09/03/2020    NERVE BLOCK Right 9/3/2020    RIGHT C3,4,5 MEDIAL BRANCH BLOCK (CPT 28218,44099) performed by Josiah Vazquez MD at [facility] OR    NERVE BLOCK Left 03/08/2021    Left L3, 4 Medial branch and L5 dorsal ramus block    NERVE BLOCK Left 3/8/2021    LEFT L3,4 MEDIAL BRANCH AND L5 DORSAL RAMUS BLOCK (CPT 27423) performed by Josiah Vazquez MD at [facility] OR    PAIN MANAGEMENT PROCEDURE Right 6/11/2020    RIGHT L3, 4 MEDIAL BRANCH AND L5 DORSAL RAMUS RADIOFREQUENCY ABLATION performed by Josiah Vazquez MD at [facility] OR    PAIN MANAGEMENT PROCEDURE Right

## 2025-02-28 ENCOUNTER — OFFICE VISIT (OUTPATIENT)
Dept: PAIN MANAGEMENT | Age: 45
End: 2025-02-28
Payer: MEDICAID

## 2025-02-28 VITALS
BODY MASS INDEX: 25.18 KG/M2 | HEIGHT: 69 IN | RESPIRATION RATE: 18 BRPM | WEIGHT: 170 LBS | HEART RATE: 58 BPM | SYSTOLIC BLOOD PRESSURE: 124 MMHG | OXYGEN SATURATION: 98 % | TEMPERATURE: 97.1 F | DIASTOLIC BLOOD PRESSURE: 84 MMHG

## 2025-02-28 DIAGNOSIS — M43.16 SPONDYLOLISTHESIS OF LUMBAR REGION: ICD-10-CM

## 2025-02-28 DIAGNOSIS — G89.4 CHRONIC PAIN SYNDROME: ICD-10-CM

## 2025-02-28 DIAGNOSIS — M47.816 LUMBAR SPONDYLOSIS: Primary | ICD-10-CM

## 2025-02-28 DIAGNOSIS — M47.812 CERVICAL FACET JOINT SYNDROME: ICD-10-CM

## 2025-02-28 DIAGNOSIS — M51.9 LUMBAR DISC DISORDER: ICD-10-CM

## 2025-02-28 DIAGNOSIS — M47.812 CERVICAL SPONDYLOSIS: ICD-10-CM

## 2025-02-28 DIAGNOSIS — M47.816 LUMBAR FACET ARTHROPATHY: ICD-10-CM

## 2025-02-28 DIAGNOSIS — M43.16 SPONDYLOLISTHESIS AT L4-L5 LEVEL: ICD-10-CM

## 2025-02-28 PROCEDURE — G8427 DOCREV CUR MEDS BY ELIG CLIN: HCPCS | Performed by: PAIN MEDICINE

## 2025-02-28 PROCEDURE — G8419 CALC BMI OUT NRM PARAM NOF/U: HCPCS | Performed by: PAIN MEDICINE

## 2025-02-28 PROCEDURE — 99214 OFFICE O/P EST MOD 30 MIN: CPT | Performed by: PAIN MEDICINE

## 2025-02-28 PROCEDURE — 1036F TOBACCO NON-USER: CPT | Performed by: PAIN MEDICINE

## 2025-02-28 RX ORDER — MELOXICAM 15 MG/1
15 TABLET ORAL DAILY PRN
Qty: 30 TABLET | Refills: 0 | Status: SHIPPED | OUTPATIENT
Start: 2025-02-28 | End: 2025-03-30

## 2025-02-28 RX ORDER — SODIUM CHLORIDE 0.9 % (FLUSH) 0.9 %
5-40 SYRINGE (ML) INJECTION PRN
OUTPATIENT
Start: 2025-02-28

## 2025-02-28 RX ORDER — SODIUM CHLORIDE 9 MG/ML
INJECTION, SOLUTION INTRAVENOUS PRN
OUTPATIENT
Start: 2025-02-28

## 2025-02-28 RX ORDER — SODIUM CHLORIDE 0.9 % (FLUSH) 0.9 %
5-40 SYRINGE (ML) INJECTION EVERY 12 HOURS SCHEDULED
OUTPATIENT
Start: 2025-02-28

## 2025-02-28 NOTE — PROGRESS NOTES
Michelet Barr presents to the [facility] on 2/28/2025. Michelet is complaining of pain in his lower back radiating to LLE The pain is intermittent. The pain is described as burning, sharp and intense. Pain is rated on his best day at a 0, on his worst day at a 9, and on average at a 5 on the VAS scale. He took his last dose of  none       Any procedures since your last visit: Yes, with 100 % relief for several months.    He is not on NSAIDS and  is not on anticoagulation medications to include none and is managed by NA.     Pacemaker or defibrillator: No     Medication Contract and Consent for Opioid Use Documents Filed        No documents found                       Resp 18   Ht 1.753 m (5' 9.02\")   Wt 77.1 kg (170 lb)   BMI 25.09 kg/m²      No LMP for male patient.

## 2025-02-28 NOTE — PROGRESS NOTES
[facility]  [address], [place] [address]  [phone]    Follow up Note      Michelet Barr     Date of Visit:  2/28/2025    CC:  Patient presents for follow up   Chief Complaint   Patient presents with    Follow-up     Bilateral Lumbar 3,4,5 medial branch radiofrequency ablation     HPI:  Worsening low back pain with no radicular symptoms, last seen 06/2024.  Pain is described as sharp/aching/constant and is aggravated by movement.  Appropriate analgesia with current medications regimen: N/A    Change in quality of symptoms:no.    Medication side effects:none.   Recent diagnostic testing:none  Recent interventional procedures:none     He has not been on anticoagulation medications to include none. The patient  has not been on herbal supplements.  The patient is not diabetic.     Imaging:   Cervical spine MRI 2020  Findings compatible with degenerative changes     Cervical spine Xray 2020  Findings consistent with moderate degenerative disc disease of C5-C6   and mild degenerative facet disease.      Lumbar spine MRI 10/2019   Changes within the facets are abnormal. Findings are compatible with   degenerative facet disease.           At L5-S1: There is a mild broad-based disc herniation, there is mild   canal stenosis       At L4-5: There is a mild broad-based disc herniation, there is mild   canal stenosis, slightly more prominent towards the left extending   into the left neural foramina       At L3-4: There is no significant disc herniation, there is no   significant canal stenosis.       At L2-3: There is no significant disc herniation, there is no   significant canal stenosis.       At L1-2: There is no significant disc herniation, there is no   significant canal stenosis.      Lumbar spine flexsion/extension 08/2018  L4 spondylolysis with grade 1 spondylolisthesis.   Approximately 4 mm translation on flexion-extension views.      Previous treatments: Physical

## 2025-03-03 ENCOUNTER — TELEPHONE (OUTPATIENT)
Dept: PAIN MANAGEMENT | Age: 45
End: 2025-03-03

## 2025-03-03 NOTE — TELEPHONE ENCOUNTER
Call to Michelet Barr that procedure was scheduled for 03/10/2025 and that [facility] should call him a few days before for the pre op call and after 3:00 PM the business day before with the arrival time. Instructed to call office back if any questions. Michelet verbalized understanding.    Electronically signed by Keke Yousif RN on 3/3/2025 at 3:55 PM

## 2025-03-10 ENCOUNTER — PREP FOR PROCEDURE (OUTPATIENT)
Dept: PAIN MANAGEMENT | Age: 45
End: 2025-03-10

## 2025-03-10 ENCOUNTER — HOSPITAL ENCOUNTER (OUTPATIENT)
Age: 45
Setting detail: OUTPATIENT SURGERY
Discharge: HOME | End: 2025-03-10
Attending: PAIN MEDICINE | Admitting: PAIN MEDICINE

## 2025-03-10 VITALS
DIASTOLIC BLOOD PRESSURE: 71 MMHG | WEIGHT: 170 LBS | HEART RATE: 71 BPM | OXYGEN SATURATION: 97 % | SYSTOLIC BLOOD PRESSURE: 137 MMHG | HEIGHT: 69 IN | TEMPERATURE: 97.1 F | RESPIRATION RATE: 16 BRPM | BODY MASS INDEX: 25.18 KG/M2

## 2025-03-10 RX ORDER — SODIUM CHLORIDE 9 MG/ML
INJECTION, SOLUTION INTRAVENOUS PRN
Status: DISCONTINUED | OUTPATIENT
Start: 2025-03-10 | End: 2025-03-11 | Stop reason: HOSPADM

## 2025-03-10 RX ORDER — SODIUM CHLORIDE 0.9 % (FLUSH) 0.9 %
5-40 SYRINGE (ML) INJECTION PRN
Status: DISCONTINUED | OUTPATIENT
Start: 2025-03-10 | End: 2025-03-11 | Stop reason: HOSPADM

## 2025-03-10 RX ORDER — SODIUM CHLORIDE 0.9 % (FLUSH) 0.9 %
5-40 SYRINGE (ML) INJECTION EVERY 12 HOURS SCHEDULED
Status: DISCONTINUED | OUTPATIENT
Start: 2025-03-10 | End: 2025-03-11 | Stop reason: HOSPADM

## 2025-03-10 ASSESSMENT — PAIN - FUNCTIONAL ASSESSMENT: PAIN_FUNCTIONAL_ASSESSMENT: 0-10

## 2025-03-10 ASSESSMENT — PAIN DESCRIPTION - DESCRIPTORS: DESCRIPTORS: ACHING

## 2025-03-17 ENCOUNTER — PROCEDURE VISIT (OUTPATIENT)
Dept: PREADMISSION TESTING | Age: 45
End: 2025-03-17

## 2025-03-17 DIAGNOSIS — M47.816 LUMBAR SPONDYLOSIS: Primary | ICD-10-CM

## 2025-03-17 NOTE — PROGRESS NOTES
Schedule for lumbar radiofrequency with Dr. Billy 3/10. Michelet cancelled he did not want to wait for

## 2025-03-21 ENCOUNTER — TELEPHONE (OUTPATIENT)
Dept: PAIN MANAGEMENT | Age: 45
End: 2025-03-21

## 2025-03-21 NOTE — TELEPHONE ENCOUNTER
Call to Michelet Barr that procedure was scheduled for 03/31/2025 and that [facility] should call him a few days before for the pre op call and after 3:00 PM the business day before with the arrival time. Instructed Michelet to hold ibuprofen for 24 hours, Celebrex, Mobic, and naprosyn for 4 days and any aspirin containing products, CoQ 10, or fish oil for 7 days. Instructed to call office back if any questions. Michelet verbalized understanding.    Electronically signed by Keke Yousif RN on 3/21/2025 at 4:47 PM

## 2025-03-31 ENCOUNTER — HOSPITAL ENCOUNTER (OUTPATIENT)
Age: 45
Setting detail: OUTPATIENT SURGERY
Discharge: HOME | End: 2025-03-31
Attending: PAIN MEDICINE | Admitting: PAIN MEDICINE
Payer: MEDICAID

## 2025-03-31 ENCOUNTER — HOSPITAL ENCOUNTER (OUTPATIENT)
Dept: OPERATING ROOM | Age: 45
Setting detail: OUTPATIENT SURGERY
Discharge: HOME | End: 2025-03-31
Attending: PAIN MEDICINE
Payer: MEDICAID

## 2025-03-31 VITALS
OXYGEN SATURATION: 98 % | WEIGHT: 170 LBS | HEIGHT: 69 IN | RESPIRATION RATE: 19 BRPM | HEART RATE: 69 BPM | TEMPERATURE: 98.4 F | SYSTOLIC BLOOD PRESSURE: 124 MMHG | DIASTOLIC BLOOD PRESSURE: 86 MMHG | BODY MASS INDEX: 25.18 KG/M2

## 2025-03-31 PROCEDURE — 3600000005 HC SURGERY LEVEL 5 BASE: Performed by: PAIN MEDICINE

## 2025-03-31 PROCEDURE — 6360000002 HC RX W HCPCS: Performed by: PAIN MEDICINE

## 2025-03-31 PROCEDURE — 7100000010 HC PHASE II RECOVERY - FIRST 15 MIN: Performed by: PAIN MEDICINE

## 2025-03-31 PROCEDURE — 2709999900 HC NON-CHARGEABLE SUPPLY: Performed by: PAIN MEDICINE

## 2025-03-31 PROCEDURE — 64635 DESTROY LUMB/SAC FACET JNT: CPT | Performed by: PAIN MEDICINE

## 2025-03-31 PROCEDURE — 64636 DESTROY L/S FACET JNT ADDL: CPT | Performed by: PAIN MEDICINE

## 2025-03-31 PROCEDURE — 7100000011 HC PHASE II RECOVERY - ADDTL 15 MIN: Performed by: PAIN MEDICINE

## 2025-03-31 PROCEDURE — 3600000015 HC SURGERY LEVEL 5 ADDTL 15MIN: Performed by: PAIN MEDICINE

## 2025-03-31 RX ORDER — LIDOCAINE HYDROCHLORIDE 20 MG/ML
INJECTION, SOLUTION EPIDURAL; INFILTRATION; INTRACAUDAL; PERINEURAL PRN
Status: DISCONTINUED | OUTPATIENT
Start: 2025-03-31 | End: 2025-03-31 | Stop reason: ALTCHOICE

## 2025-03-31 ASSESSMENT — PAIN - FUNCTIONAL ASSESSMENT
PAIN_FUNCTIONAL_ASSESSMENT: NONE - DENIES PAIN

## 2025-03-31 NOTE — H&P
[facility]  [address] [place] [address]  [phone]    Procedure History & Physical      Michelet FREEDOM Cortney     HPI:    Patient  is here for axial low back pain for bilateral L3,4,5 MB RFA.  Labs/imaging studies reviewed   All question and concerns addressed including R/B/A associated with the procedure    Past Medical History:   Diagnosis Date    Chronic back pain     Kidney stone     Low back pain     Neck pain     Urinary frequency        Past Surgical History:   Procedure Laterality Date    ANESTHESIA NERVE BLOCK Right 1/13/2020    RIGHT L3,4 MEDIAL BRANCH AND DORSAL RAMUS BLOCK WITHOUT SEDATION (CPT 04666,54833) performed by Josiah Vazquez MD at [facility] OR    ANESTHESIA NERVE BLOCK Right 2/20/2020    RIGHT L3,4 MEDIAL BRANCH AND L5 DORSAL RAMUS BLOCK (CPT 60791,95695) performed by Josiah Vazquez MD at [facility] OR    DENTAL SURGERY      NERVE BLOCK Right 01/13/2020    mbb and dorsal rami    NERVE BLOCK Right 02/20/2020    medial branch block    NERVE BLOCK Right 09/03/2020    NERVE BLOCK Right 9/3/2020    RIGHT C3,4,5 MEDIAL BRANCH BLOCK (CPT 58389,04004) performed by Josiah Vazquez MD at [facility] OR    NERVE BLOCK Left 03/08/2021    Left L3, 4 Medial branch and L5 dorsal ramus block    NERVE BLOCK Left 3/8/2021    LEFT L3,4 MEDIAL BRANCH AND L5 DORSAL RAMUS BLOCK (CPT 99031) performed by Josiah Vazquez MD at [facility] OR    PAIN MANAGEMENT PROCEDURE Right 6/11/2020    RIGHT L3, 4 MEDIAL BRANCH AND L5 DORSAL RAMUS RADIOFREQUENCY ABLATION performed by Josiah Vazquez MD at [facility] OR    PAIN MANAGEMENT PROCEDURE Right 6/14/2021    RIGHT L3, 4, 5 MEDIAL BRANCH  RADIOFREQUENCY ABLATION (CPT 73904)(WANTS LAST CASE) performed by Josiah Vazquez MD at [facility] OR    PAIN MANAGEMENT PROCEDURE Bilateral 8/11/2022    BILATERAL L3,4,5 MEDIAL BRANCH RADIOFREQUENCY ABLATION (CPT 32671) performed by Josiah Vazquez MD at [facility] OR    PAIN MANAGEMENT

## 2025-03-31 NOTE — DISCHARGE INSTRUCTIONS
[facility]  [phone]   Post-Pain Block/ Radiofrequency Home Going Instructions    1-Go home, rest for the remainder of the day  2-Please do not lift over 20 pounds the day of the injection  3-If you received sedation No: alcohol, driving, operating lawn mowers, plows, tractors or other dangerous equipment until next morning. Do not make important decisions or sign legal documents for 24 hours. You may experience light headedness, dizziness, nausea or sleepiness after sedation. Do not stay alone. A responsible adult must be with you for 24 hours. You could be nauseated from the medications you have received. Your IV site may be sore and bruised.    4-No dietary restrictions     5-Resume all medications the same day, blood thinners to be resumed 24 hours after injection    6-Keep the surgical site clean and dry, you may shower the next morning and remove the      dressing.     7- No sitz baths, tub baths or hot tubs/swimming for 24 hours.       8- If you have any pain at the injection site(s), application of an ice pack to the area should be       helpful, 20 minutes on/20 minutes off for next 48 hours.  9- Call [facility] pain management immediately at if you develop.  Fever greater than 100.4 F  Have bleeding or drainage from the puncture site  Have progressive Leg/arm numbness and or weakness  Loss of control of bowel and or bladder (wet/soil yourself)  Severe headache with inability to lift head  10-You may return to work the next day

## 2025-03-31 NOTE — OP NOTE
3/31/2025    Patient: Michelet Barr  :  1980  Age:  44 y.o.  Sex:  male     PRE-OPERATIVE DIAGNOSIS: Bilateral Lumbar spondylosis, lumbar facet arthropathy.    POST-OPERATIVE DIAGNOSIS: Same.    PROCEDURE:  Fluoroscopic-guided Bilateral  Lumbar facet medial branch radiofrequency ablation at levels L3,4,5 MB     SURGEON:  CHACE Davis    ANESTHESIA: Local    ESTIMATED BLOOD LOSS: None.  ______________________________________________________________________  HISTORY & PHYSICAL: Michelet Barr presents today for fluoroscopic-guided Bilateral lumbar facet medial branch radiofrequency ablation at levels L3,4,5 MB. The potential complications of the procedure were explained to Michelet again today and he has elected to undergo the aforementioned procedure.    Michelet’s complete History & Physical examination were reviewed in depth, a copy of which is in the chart.      DESCRIPTION OF PROCEDURE:    After confirming written and informed consent, a time-out was performed and Michelet’s name and date of birth, the procedure to be performed as well as the plan for the location of the needle insertion were confirmed.    The patient was brought into the procedure room and placed in the prone position on the fluoroscopy table. Standard monitors were placed and vital signs were observed throughout the procedure. The area of the lumbar spine and upper buttocks was sterilely prepped with chloraprep and draped in a sterile manner. AP fluoroscopy was used to identify and frank bartons point at the targeted area. A 22 gauge 10 mm radiofrequency probe was advanced toward each of these points. Once bone was contacted, negative aspiration for blood and CSF was confirmed, sensory stimulation was performed at 50 Hz and at 0.4 volts generating a pressure sensation. Motor stimulation < 2.0 volts elicited multifidus twitching without any radicular symptoms. 1 ml of 2% lidocaine was injected prior to lesioning, which was performed for 90 seconds

## 2025-04-01 ENCOUNTER — OFFICE VISIT (OUTPATIENT)
Dept: PRIMARY CARE CLINIC | Age: 45
End: 2025-04-01
Payer: MEDICAID

## 2025-04-01 VITALS
WEIGHT: 175 LBS | HEART RATE: 70 BPM | SYSTOLIC BLOOD PRESSURE: 110 MMHG | TEMPERATURE: 97.3 F | HEIGHT: 69 IN | RESPIRATION RATE: 16 BRPM | OXYGEN SATURATION: 97 % | DIASTOLIC BLOOD PRESSURE: 70 MMHG | BODY MASS INDEX: 25.92 KG/M2

## 2025-04-01 DIAGNOSIS — R21 RASH: Primary | ICD-10-CM

## 2025-04-01 PROCEDURE — G8419 CALC BMI OUT NRM PARAM NOF/U: HCPCS | Performed by: STUDENT IN AN ORGANIZED HEALTH CARE EDUCATION/TRAINING PROGRAM

## 2025-04-01 PROCEDURE — 1036F TOBACCO NON-USER: CPT | Performed by: STUDENT IN AN ORGANIZED HEALTH CARE EDUCATION/TRAINING PROGRAM

## 2025-04-01 PROCEDURE — 99213 OFFICE O/P EST LOW 20 MIN: CPT | Performed by: STUDENT IN AN ORGANIZED HEALTH CARE EDUCATION/TRAINING PROGRAM

## 2025-04-01 PROCEDURE — G8427 DOCREV CUR MEDS BY ELIG CLIN: HCPCS | Performed by: STUDENT IN AN ORGANIZED HEALTH CARE EDUCATION/TRAINING PROGRAM

## 2025-04-01 RX ORDER — ECONAZOLE NITRATE 10 MG/G
CREAM TOPICAL
Qty: 85 G | Refills: 0 | Status: SHIPPED | OUTPATIENT
Start: 2025-04-01

## 2025-04-01 NOTE — PROGRESS NOTES
[facility]  Department of Family Medicine      Patient:  Michelet Barr 44 y.o. male     Date of Service: 4/1/25      Chief complaint:   Chief Complaint   Patient presents with    Rash         History ofPresent Illness   The patient is a 44 y.o. male  presented to the clinic with complaints as above.    Rash  -new issue  -started months ago  -happens intermittently, gets rash in between his butt cheeks  -is very itchy and painful  -slight right now  -uses alcohol to dry it out, hydrocortisone cream seems to help       Past Medical History:      Diagnosis Date    Chronic back pain     Kidney stone     Low back pain     Neck pain     Urinary frequency        PastSurgical History:        Procedure Laterality Date    ANESTHESIA NERVE BLOCK Right 1/13/2020    RIGHT L3,4 MEDIAL BRANCH AND DORSAL RAMUS BLOCK WITHOUT SEDATION (CPT 32693,43364) performed by Josiah Vazquez MD at [facility] OR    ANESTHESIA NERVE BLOCK Right 2/20/2020    RIGHT L3,4 MEDIAL BRANCH AND L5 DORSAL RAMUS BLOCK (CPT 87828,36612) performed by Josiah Vazquez MD at [facility] OR    DENTAL SURGERY      NERVE BLOCK Right 01/13/2020    mbb and dorsal rami    NERVE BLOCK Right 02/20/2020    medial branch block    NERVE BLOCK Right 09/03/2020    NERVE BLOCK Right 9/3/2020    RIGHT C3,4,5 MEDIAL BRANCH BLOCK (CPT 97826,13628) performed by Josiah Vazquez MD at [facility] OR    NERVE BLOCK Left 03/08/2021    Left L3, 4 Medial branch and L5 dorsal ramus block    NERVE BLOCK Left 3/8/2021    LEFT L3,4 MEDIAL BRANCH AND L5 DORSAL RAMUS BLOCK (CPT 36480) performed by Josiah Vazquez MD at [facility] OR    PAIN MANAGEMENT PROCEDURE Right 6/11/2020    RIGHT L3, 4 MEDIAL BRANCH AND L5 DORSAL RAMUS RADIOFREQUENCY ABLATION performed by Josiah Vazquez MD at [facility] OR    PAIN MANAGEMENT PROCEDURE Right 6/14/2021    RIGHT L3, 4, 5 MEDIAL BRANCH  RADIOFREQUENCY ABLATION (CPT 59921)(WANTS LAST CASE) performed by Josiah Vazquez MD at

## 2025-04-09 ENCOUNTER — TELEPHONE (OUTPATIENT)
Dept: PRIMARY CARE CLINIC | Age: 45
End: 2025-04-09

## 2025-04-09 DIAGNOSIS — R21 RASH: Primary | ICD-10-CM

## 2025-04-09 RX ORDER — FLUCONAZOLE 150 MG/1
150 TABLET ORAL WEEKLY
Qty: 4 TABLET | Refills: 0 | Status: SHIPPED | OUTPATIENT
Start: 2025-04-09 | End: 2025-05-01

## 2025-04-09 NOTE — TELEPHONE ENCOUNTER
Oral medication has been sent in.  Please let me know if this helps    Best regards,  Scott Leonardo

## 2025-04-09 NOTE — TELEPHONE ENCOUNTER
A Ask: Rash     B Background:  told him to give the office a call if his rash didn't get better     O Obtain more information: the rash is a little better but its not gone away and the cream prescribed didn't really help   U Upload pertinent information     T Telephone encounter: Forwarding for further action

## 2025-04-28 ENCOUNTER — TELEPHONE (OUTPATIENT)
Dept: PRIMARY CARE CLINIC | Age: 45
End: 2025-04-28

## 2025-04-28 NOTE — TELEPHONE ENCOUNTER
Patient called in and states all the medication that was prescribed did not provide any relief. He is asking for an alternative or wants to know if he needs to come in to get evaluated again

## 2025-05-01 ENCOUNTER — OFFICE VISIT (OUTPATIENT)
Dept: PRIMARY CARE CLINIC | Age: 45
End: 2025-05-01
Payer: MEDICAID

## 2025-05-01 VITALS
SYSTOLIC BLOOD PRESSURE: 128 MMHG | DIASTOLIC BLOOD PRESSURE: 74 MMHG | HEIGHT: 69 IN | RESPIRATION RATE: 17 BRPM | TEMPERATURE: 97.3 F | OXYGEN SATURATION: 98 % | WEIGHT: 179 LBS | BODY MASS INDEX: 26.51 KG/M2 | HEART RATE: 58 BPM

## 2025-05-01 DIAGNOSIS — R21 RASH: Primary | ICD-10-CM

## 2025-05-01 LAB
ALBUMIN: 4.1 G/DL (ref 3.5–5.2)
ALP BLD-CCNC: 81 U/L (ref 40–129)
ALT SERPL-CCNC: 25 U/L (ref 0–50)
ANION GAP SERPL CALCULATED.3IONS-SCNC: 11 MMOL/L (ref 7–16)
AST SERPL-CCNC: 27 U/L (ref 0–50)
BILIRUB SERPL-MCNC: 0.6 MG/DL (ref 0–1.2)
BUN BLDV-MCNC: 12 MG/DL (ref 6–20)
CALCIUM SERPL-MCNC: 9.3 MG/DL (ref 8.6–10)
CHLORIDE BLD-SCNC: 101 MMOL/L (ref 98–107)
CO2: 27 MMOL/L (ref 22–29)
CREAT SERPL-MCNC: 0.9 MG/DL (ref 0.7–1.2)
GFR, ESTIMATED: >90 ML/MIN/1.73M2
GLUCOSE BLD-MCNC: 127 MG/DL (ref 74–99)
HCT VFR BLD CALC: 45 % (ref 37–54)
HEMOGLOBIN: 16.1 G/DL (ref 12.5–16.5)
MCH RBC QN AUTO: 33.1 PG (ref 26–35)
MCHC RBC AUTO-ENTMCNC: 35.8 G/DL (ref 32–34.5)
MCV RBC AUTO: 92.4 FL (ref 80–99.9)
PDW BLD-RTO: 12.7 % (ref 11.5–15)
PLATELET # BLD: 140 K/UL (ref 130–450)
PMV BLD AUTO: 12.5 FL (ref 7–12)
POTASSIUM SERPL-SCNC: 4.1 MMOL/L (ref 3.5–5.1)
RBC # BLD: 4.87 M/UL (ref 3.8–5.8)
SODIUM BLD-SCNC: 139 MMOL/L (ref 136–145)
TOTAL PROTEIN: 6.8 G/DL (ref 6.4–8.3)
TSH SERPL DL<=0.05 MIU/L-ACNC: 3.69 UIU/ML (ref 0.27–4.2)
WBC # BLD: 5.2 K/UL (ref 4.5–11.5)

## 2025-05-01 PROCEDURE — G8427 DOCREV CUR MEDS BY ELIG CLIN: HCPCS | Performed by: STUDENT IN AN ORGANIZED HEALTH CARE EDUCATION/TRAINING PROGRAM

## 2025-05-01 PROCEDURE — G2211 COMPLEX E/M VISIT ADD ON: HCPCS | Performed by: STUDENT IN AN ORGANIZED HEALTH CARE EDUCATION/TRAINING PROGRAM

## 2025-05-01 PROCEDURE — 36415 COLL VENOUS BLD VENIPUNCTURE: CPT | Performed by: STUDENT IN AN ORGANIZED HEALTH CARE EDUCATION/TRAINING PROGRAM

## 2025-05-01 PROCEDURE — 1036F TOBACCO NON-USER: CPT | Performed by: STUDENT IN AN ORGANIZED HEALTH CARE EDUCATION/TRAINING PROGRAM

## 2025-05-01 PROCEDURE — 99214 OFFICE O/P EST MOD 30 MIN: CPT | Performed by: STUDENT IN AN ORGANIZED HEALTH CARE EDUCATION/TRAINING PROGRAM

## 2025-05-01 PROCEDURE — G8419 CALC BMI OUT NRM PARAM NOF/U: HCPCS | Performed by: STUDENT IN AN ORGANIZED HEALTH CARE EDUCATION/TRAINING PROGRAM

## 2025-05-01 RX ORDER — HYDROCORTISONE 25 MG/G
CREAM TOPICAL
Qty: 45 G | Refills: 0 | Status: SHIPPED | OUTPATIENT
Start: 2025-05-01

## 2025-05-01 RX ORDER — PREDNISONE 20 MG/1
20 TABLET ORAL DAILY
Qty: 10 TABLET | Refills: 0 | Status: SHIPPED | OUTPATIENT
Start: 2025-05-01 | End: 2025-05-11

## 2025-05-01 NOTE — PROGRESS NOTES
[facility]  Department of Family Medicine      Patient:  Michelet Barr 44 y.o. male     Date of Service: 5/1/25      Chief complaint:   Chief Complaint   Patient presents with    Rash         History ofPresent Illness   The patient is a 44 y.o. male  presented to the clinic with complaints as above.    Rash  -f/u  -trialed antifungal cream and oral  -currently, no improvement   -only thing that helps is hydrocortisone cream  -rash is in gluteal fold     Past Medical History:      Diagnosis Date    Chronic back pain     Kidney stone     Low back pain     Neck pain     Urinary frequency        PastSurgical History:        Procedure Laterality Date    ANESTHESIA NERVE BLOCK Right 1/13/2020    RIGHT L3,4 MEDIAL BRANCH AND DORSAL RAMUS BLOCK WITHOUT SEDATION (CPT 88933,55810) performed by Josiah Vazquez MD at [facility] OR    ANESTHESIA NERVE BLOCK Right 2/20/2020    RIGHT L3,4 MEDIAL BRANCH AND L5 DORSAL RAMUS BLOCK (CPT 80923,90419) performed by Josiah Vazquez MD at [facility] OR    DENTAL SURGERY      NERVE BLOCK Right 01/13/2020    mbb and dorsal rami    NERVE BLOCK Right 02/20/2020    medial branch block    NERVE BLOCK Right 09/03/2020    NERVE BLOCK Right 9/3/2020    RIGHT C3,4,5 MEDIAL BRANCH BLOCK (CPT 14040,22514) performed by Josiah Vazquez MD at [facility] OR    NERVE BLOCK Left 03/08/2021    Left L3, 4 Medial branch and L5 dorsal ramus block    NERVE BLOCK Left 3/8/2021    LEFT L3,4 MEDIAL BRANCH AND L5 DORSAL RAMUS BLOCK (CPT 86833) performed by Josiah Vazquez MD at [facility] OR    PAIN MANAGEMENT PROCEDURE Right 6/11/2020    RIGHT L3, 4 MEDIAL BRANCH AND L5 DORSAL RAMUS RADIOFREQUENCY ABLATION performed by Josiah Vazquez MD at [facility] OR    PAIN MANAGEMENT PROCEDURE Right 6/14/2021    RIGHT L3, 4, 5 MEDIAL BRANCH  RADIOFREQUENCY ABLATION (CPT 70019)(WANTS LAST CASE) performed by Josiah Vazquez MD at [facility] OR    PAIN MANAGEMENT PROCEDURE Bilateral 8/11/2022

## 2025-05-02 ENCOUNTER — RESULTS FOLLOW-UP (OUTPATIENT)
Dept: PRIMARY CARE CLINIC | Age: 45
End: 2025-05-02

## 2025-05-12 ENCOUNTER — TELEPHONE (OUTPATIENT)
Dept: PRIMARY CARE CLINIC | Age: 45
End: 2025-05-12

## 2025-05-12 DIAGNOSIS — R21 RASH: Primary | ICD-10-CM

## 2025-05-12 NOTE — TELEPHONE ENCOUNTER
Michelet was calling to notify  that the steroid  called in for his rash also did not help. He would like to see what dr recommends next.

## 2025-05-20 ENCOUNTER — OFFICE VISIT (OUTPATIENT)
Dept: PRIMARY CARE CLINIC | Age: 45
End: 2025-05-20
Payer: MEDICAID

## 2025-05-20 VITALS
HEART RATE: 76 BPM | BODY MASS INDEX: 26.87 KG/M2 | WEIGHT: 181.4 LBS | TEMPERATURE: 97.1 F | RESPIRATION RATE: 17 BRPM | HEIGHT: 69 IN | SYSTOLIC BLOOD PRESSURE: 112 MMHG | OXYGEN SATURATION: 97 % | DIASTOLIC BLOOD PRESSURE: 76 MMHG

## 2025-05-20 DIAGNOSIS — R21 RASH: Primary | ICD-10-CM

## 2025-05-20 DIAGNOSIS — L23.7 POISON IVY DERMATITIS: ICD-10-CM

## 2025-05-20 PROCEDURE — G2211 COMPLEX E/M VISIT ADD ON: HCPCS | Performed by: STUDENT IN AN ORGANIZED HEALTH CARE EDUCATION/TRAINING PROGRAM

## 2025-05-20 PROCEDURE — 99213 OFFICE O/P EST LOW 20 MIN: CPT | Performed by: STUDENT IN AN ORGANIZED HEALTH CARE EDUCATION/TRAINING PROGRAM

## 2025-05-20 PROCEDURE — G8427 DOCREV CUR MEDS BY ELIG CLIN: HCPCS | Performed by: STUDENT IN AN ORGANIZED HEALTH CARE EDUCATION/TRAINING PROGRAM

## 2025-05-20 PROCEDURE — G8419 CALC BMI OUT NRM PARAM NOF/U: HCPCS | Performed by: STUDENT IN AN ORGANIZED HEALTH CARE EDUCATION/TRAINING PROGRAM

## 2025-05-20 PROCEDURE — 1036F TOBACCO NON-USER: CPT | Performed by: STUDENT IN AN ORGANIZED HEALTH CARE EDUCATION/TRAINING PROGRAM

## 2025-05-20 RX ORDER — MUPIROCIN CALCIUM 20 MG/G
CREAM TOPICAL
Qty: 30 G | Refills: 0 | Status: SHIPPED | OUTPATIENT
Start: 2025-05-20 | End: 2025-06-19

## 2025-05-20 RX ORDER — AMOXICILLIN 500 MG/1
1000 CAPSULE ORAL 2 TIMES DAILY
Qty: 40 CAPSULE | Refills: 0 | Status: SHIPPED | OUTPATIENT
Start: 2025-05-20 | End: 2025-05-30

## 2025-05-20 NOTE — PROGRESS NOTES
[facility]  Department of Family Medicine      Patient:  Michelet Barr 44 y.o. male     Date of Service: 5/20/25      Chief complaint:   Chief Complaint   Patient presents with    Rash         History ofPresent Illness   The patient is a 44 y.o. male  presented to the clinic with complaints as above.    Rash  -f/u  -trialed steroid cream and oral, did not help however improving now  -seeing derm    Got poison ivy recently   -putting hydrocortisone cream on the area which helps     Past Medical History:      Diagnosis Date    Chronic back pain     Kidney stone     Low back pain     Neck pain     Urinary frequency        PastSurgical History:        Procedure Laterality Date    ANESTHESIA NERVE BLOCK Right 1/13/2020    RIGHT L3,4 MEDIAL BRANCH AND DORSAL RAMUS BLOCK WITHOUT SEDATION (CPT 66001,40528) performed by Josiah Vazquez MD at [facility] OR    ANESTHESIA NERVE BLOCK Right 2/20/2020    RIGHT L3,4 MEDIAL BRANCH AND L5 DORSAL RAMUS BLOCK (CPT 35715,62384) performed by Josiah Vazquez MD at [facility] OR    DENTAL SURGERY      NERVE BLOCK Right 01/13/2020    mbb and dorsal rami    NERVE BLOCK Right 02/20/2020    medial branch block    NERVE BLOCK Right 09/03/2020    NERVE BLOCK Right 9/3/2020    RIGHT C3,4,5 MEDIAL BRANCH BLOCK (CPT 40650,45410) performed by Josiah Vazquez MD at [facility] OR    NERVE BLOCK Left 03/08/2021    Left L3, 4 Medial branch and L5 dorsal ramus block    NERVE BLOCK Left 3/8/2021    LEFT L3,4 MEDIAL BRANCH AND L5 DORSAL RAMUS BLOCK (CPT 00273) performed by Josiah Vazquez MD at [facility] OR    PAIN MANAGEMENT PROCEDURE Right 6/11/2020    RIGHT L3, 4 MEDIAL BRANCH AND L5 DORSAL RAMUS RADIOFREQUENCY ABLATION performed by Josiah Vazquez MD at [facility] OR    PAIN MANAGEMENT PROCEDURE Right 6/14/2021    RIGHT L3, 4, 5 MEDIAL BRANCH  RADIOFREQUENCY ABLATION (CPT 27300)(WANTS LAST CASE) performed by Josiah Vazquez MD at [facility] OR    PAIN MANAGEMENT

## 2025-06-23 ENCOUNTER — TELEPHONE (OUTPATIENT)
Dept: PRIMARY CARE CLINIC | Age: 45
End: 2025-06-23

## 2025-06-23 DIAGNOSIS — R21 RASH: Primary | ICD-10-CM

## 2025-06-23 NOTE — TELEPHONE ENCOUNTER
Patient advised to contact his insurance and find dermatologist covered in the area. He will try and give us a call back

## 2025-06-23 NOTE — TELEPHONE ENCOUNTER
Is there anyone he could talk with his insurance and see who is covered?  I will place referral to different one.    Best regards,  Scott Leonardo

## 2025-06-23 NOTE — TELEPHONE ENCOUNTER
Michelet is notifying  that the dermatologist he was referred to will not accept his insurance. He is asking Dr for a new referral.

## (undated) DEVICE — 6 ML SYRINGE LUER-LOCK TIP: Brand: MONOJECT

## (undated) DEVICE — 3 ML SYRINGE LUER-LOCK TIP: Brand: MONOJECT

## (undated) DEVICE — GLOVE ORANGE PI 7 1/2   MSG9075

## (undated) DEVICE — NEEDLE HYPO 25GA L1.5IN BLU POLYPR HUB S STL REG BVL STR

## (undated) DEVICE — BANDAGE ADH W1XL3IN NAT FAB WVN FLX DURABLE N ADH PD SEAL

## (undated) DEVICE — GAUZE,SPONGE,4"X4",12PLY,STERILE,LF,2'S: Brand: MEDLINE

## (undated) DEVICE — ELECTRODE SURG MPLR NEUT SELF ADH PT PLT MULTIGEN

## (undated) DEVICE — Z DISCONTINUED APPLICATOR SURG PREP 0.35OZ 2% CHG 70% ISO ALC W/ HI LT

## (undated) DEVICE — CVD CANNULA

## (undated) DEVICE — 12 ML SYRINGE,LUER-LOCK TIP: Brand: MONOJECT

## (undated) DEVICE — NEEDLE HYPO 27GA L1.25IN GRY POLYPR HUB S STL REG BVL STR

## (undated) DEVICE — 3M™ RED DOT™ MONITORING ELECTRODE WITH FOAM TAPE AND STICKY GEL 2560, 50/BAG, 20/CASE, 72/PLT: Brand: RED DOT™

## (undated) DEVICE — 4-PORT MANIFOLD: Brand: NEPTUNE 2

## (undated) DEVICE — NEEDLE HYPO 18GA L1.5IN PNK POLYPR HUB S STL THN WALL FILL

## (undated) DEVICE — APPLICATOR MEDICATED 26 CC SOLUTION HI LT ORNG CHLORAPREP

## (undated) DEVICE — NEEDLE HYPO 18GA L1.5IN PNK POLYPR HUB S STL REG BVL STR

## (undated) DEVICE — SYRINGE MEDICAL 3ML CLEAR PLASTIC STANDARD NON CONTROL LUERLOCK TIP DISPOSABLE

## (undated) DEVICE — NEEDLE SPNL 22GA L3.5IN BLK HUB S STL REG WALL FIT STYL W/